# Patient Record
Sex: FEMALE | Race: OTHER | Employment: OTHER | URBAN - METROPOLITAN AREA
[De-identification: names, ages, dates, MRNs, and addresses within clinical notes are randomized per-mention and may not be internally consistent; named-entity substitution may affect disease eponyms.]

---

## 2022-05-01 ENCOUNTER — APPOINTMENT (OUTPATIENT)
Dept: CT IMAGING | Age: 67
End: 2022-05-01
Attending: EMERGENCY MEDICINE

## 2022-05-01 ENCOUNTER — APPOINTMENT (OUTPATIENT)
Dept: CT IMAGING | Age: 67
DRG: 064 | End: 2022-05-01
Attending: STUDENT IN AN ORGANIZED HEALTH CARE EDUCATION/TRAINING PROGRAM

## 2022-05-01 ENCOUNTER — APPOINTMENT (OUTPATIENT)
Dept: GENERAL RADIOLOGY | Age: 67
End: 2022-05-01
Attending: EMERGENCY MEDICINE

## 2022-05-01 ENCOUNTER — HOSPITAL ENCOUNTER (EMERGENCY)
Age: 67
Discharge: OTHER INSTITUTION | End: 2022-05-01
Attending: EMERGENCY MEDICINE

## 2022-05-01 ENCOUNTER — HOSPITAL ENCOUNTER (INPATIENT)
Age: 67
LOS: 16 days | Discharge: HOSPICE | DRG: 064 | End: 2022-05-17
Attending: STUDENT IN AN ORGANIZED HEALTH CARE EDUCATION/TRAINING PROGRAM | Admitting: INTERNAL MEDICINE

## 2022-05-01 VITALS
OXYGEN SATURATION: 98 % | RESPIRATION RATE: 17 BRPM | TEMPERATURE: 98.1 F | SYSTOLIC BLOOD PRESSURE: 142 MMHG | HEART RATE: 105 BPM | DIASTOLIC BLOOD PRESSURE: 68 MMHG

## 2022-05-01 DIAGNOSIS — R63.30 FEEDING DIFFICULTIES: ICD-10-CM

## 2022-05-01 DIAGNOSIS — Z78.9 LANGUAGE BARRIER: ICD-10-CM

## 2022-05-01 DIAGNOSIS — R29.810 FACIAL DROOP: ICD-10-CM

## 2022-05-01 DIAGNOSIS — I61.9 HEMORRHAGIC STROKE (HCC): Primary | ICD-10-CM

## 2022-05-01 DIAGNOSIS — I61.9 INTRAPARENCHYMAL HEMORRHAGE OF BRAIN (HCC): Primary | ICD-10-CM

## 2022-05-01 DIAGNOSIS — G81.90 HEMIPARESIS, UNSPECIFIED HEMIPARESIS ETIOLOGY, UNSPECIFIED LATERALITY (HCC): ICD-10-CM

## 2022-05-01 DIAGNOSIS — Z51.5 PALLIATIVE CARE ENCOUNTER: ICD-10-CM

## 2022-05-01 LAB
ANION GAP SERPL CALC-SCNC: 8 MMOL/L (ref 5–15)
BASOPHILS # BLD: 0.1 K/UL (ref 0–0.1)
BASOPHILS NFR BLD: 1 % (ref 0–1)
BUN SERPL-MCNC: 9 MG/DL (ref 6–20)
BUN/CREAT SERPL: 13 (ref 12–20)
CALCIUM SERPL-MCNC: 8.8 MG/DL (ref 8.5–10.1)
CHLORIDE SERPL-SCNC: 105 MMOL/L (ref 97–108)
CO2 SERPL-SCNC: 25 MMOL/L (ref 21–32)
COMMENT, HOLDF: NORMAL
CREAT SERPL-MCNC: 0.72 MG/DL (ref 0.55–1.02)
DIFFERENTIAL METHOD BLD: ABNORMAL
EOSINOPHIL # BLD: 0 K/UL (ref 0–0.4)
EOSINOPHIL NFR BLD: 0 % (ref 0–7)
ERYTHROCYTE [DISTWIDTH] IN BLOOD BY AUTOMATED COUNT: 13 % (ref 11.5–14.5)
GLUCOSE SERPL-MCNC: 139 MG/DL (ref 65–100)
HCT VFR BLD AUTO: 37.4 % (ref 35–47)
HGB BLD-MCNC: 12.1 G/DL (ref 11.5–16)
IMM GRANULOCYTES # BLD AUTO: 0 K/UL (ref 0–0.04)
IMM GRANULOCYTES NFR BLD AUTO: 0 % (ref 0–0.5)
LYMPHOCYTES # BLD: 0.7 K/UL (ref 0.8–3.5)
LYMPHOCYTES NFR BLD: 5 % (ref 12–49)
MCH RBC QN AUTO: 29.8 PG (ref 26–34)
MCHC RBC AUTO-ENTMCNC: 32.4 G/DL (ref 30–36.5)
MCV RBC AUTO: 92.1 FL (ref 80–99)
MONOCYTES # BLD: 0.7 K/UL (ref 0–1)
MONOCYTES NFR BLD: 5 % (ref 5–13)
NEUTS SEG # BLD: 11.6 K/UL (ref 1.8–8)
NEUTS SEG NFR BLD: 89 % (ref 32–75)
NRBC # BLD: 0 K/UL (ref 0–0.01)
NRBC BLD-RTO: 0 PER 100 WBC
PLATELET # BLD AUTO: 243 K/UL (ref 150–400)
PMV BLD AUTO: 11.2 FL (ref 8.9–12.9)
POTASSIUM SERPL-SCNC: 3.3 MMOL/L (ref 3.5–5.1)
RBC # BLD AUTO: 4.06 M/UL (ref 3.8–5.2)
RBC MORPH BLD: ABNORMAL
SAMPLES BEING HELD,HOLD: NORMAL
SODIUM SERPL-SCNC: 138 MMOL/L (ref 136–145)
TSH SERPL DL<=0.05 MIU/L-ACNC: 0.48 UIU/ML (ref 0.36–3.74)
WBC # BLD AUTO: 13.1 K/UL (ref 3.6–11)

## 2022-05-01 PROCEDURE — 96375 TX/PRO/DX INJ NEW DRUG ADDON: CPT

## 2022-05-01 PROCEDURE — 96374 THER/PROPH/DIAG INJ IV PUSH: CPT

## 2022-05-01 PROCEDURE — 84443 ASSAY THYROID STIM HORMONE: CPT

## 2022-05-01 PROCEDURE — 74011250636 HC RX REV CODE- 250/636: Performed by: NURSE PRACTITIONER

## 2022-05-01 PROCEDURE — 99285 EMERGENCY DEPT VISIT HI MDM: CPT

## 2022-05-01 PROCEDURE — 85610 PROTHROMBIN TIME: CPT

## 2022-05-01 PROCEDURE — 70450 CT HEAD/BRAIN W/O DYE: CPT

## 2022-05-01 PROCEDURE — 74011000250 HC RX REV CODE- 250: Performed by: NURSE PRACTITIONER

## 2022-05-01 PROCEDURE — 74011250636 HC RX REV CODE- 250/636: Performed by: EMERGENCY MEDICINE

## 2022-05-01 PROCEDURE — 80048 BASIC METABOLIC PNL TOTAL CA: CPT

## 2022-05-01 PROCEDURE — 93005 ELECTROCARDIOGRAM TRACING: CPT

## 2022-05-01 PROCEDURE — 85025 COMPLETE CBC W/AUTO DIFF WBC: CPT

## 2022-05-01 PROCEDURE — 65610000006 HC RM INTENSIVE CARE

## 2022-05-01 PROCEDURE — 36415 COLL VENOUS BLD VENIPUNCTURE: CPT

## 2022-05-01 PROCEDURE — 70496 CT ANGIOGRAPHY HEAD: CPT

## 2022-05-01 PROCEDURE — 74011000250 HC RX REV CODE- 250: Performed by: INTERNAL MEDICINE

## 2022-05-01 PROCEDURE — 71045 X-RAY EXAM CHEST 1 VIEW: CPT

## 2022-05-01 RX ORDER — LABETALOL HYDROCHLORIDE 5 MG/ML
10 INJECTION, SOLUTION INTRAVENOUS
Status: DISCONTINUED | OUTPATIENT
Start: 2022-05-01 | End: 2022-05-17 | Stop reason: HOSPADM

## 2022-05-01 RX ORDER — SODIUM CHLORIDE 0.9 % (FLUSH) 0.9 %
5-40 SYRINGE (ML) INJECTION EVERY 8 HOURS
Status: DISCONTINUED | OUTPATIENT
Start: 2022-05-01 | End: 2022-05-15

## 2022-05-01 RX ORDER — LORAZEPAM 2 MG/ML
0.5 INJECTION INTRAMUSCULAR ONCE
Status: COMPLETED | OUTPATIENT
Start: 2022-05-01 | End: 2022-05-01

## 2022-05-01 RX ORDER — ACETAMINOPHEN 325 MG/1
650 TABLET ORAL
Status: DISCONTINUED | OUTPATIENT
Start: 2022-05-01 | End: 2022-05-17 | Stop reason: HOSPADM

## 2022-05-01 RX ORDER — POTASSIUM CHLORIDE 7.45 MG/ML
10 INJECTION INTRAVENOUS
Status: COMPLETED | OUTPATIENT
Start: 2022-05-01 | End: 2022-05-02

## 2022-05-01 RX ORDER — SODIUM CHLORIDE 0.9 % (FLUSH) 0.9 %
5-40 SYRINGE (ML) INJECTION AS NEEDED
Status: DISCONTINUED | OUTPATIENT
Start: 2022-05-01 | End: 2022-05-15

## 2022-05-01 RX ORDER — ONDANSETRON 4 MG/1
4 TABLET, ORALLY DISINTEGRATING ORAL
Status: DISCONTINUED | OUTPATIENT
Start: 2022-05-01 | End: 2022-05-17 | Stop reason: HOSPADM

## 2022-05-01 RX ORDER — DEXMEDETOMIDINE HYDROCHLORIDE 4 UG/ML
.1-1.5 INJECTION, SOLUTION INTRAVENOUS
Status: DISCONTINUED | OUTPATIENT
Start: 2022-05-01 | End: 2022-05-04

## 2022-05-01 RX ORDER — ACETAMINOPHEN 650 MG/1
650 SUPPOSITORY RECTAL
Status: DISCONTINUED | OUTPATIENT
Start: 2022-05-01 | End: 2022-05-17 | Stop reason: HOSPADM

## 2022-05-01 RX ORDER — LEVETIRACETAM 500 MG/5ML
1000 INJECTION, SOLUTION, CONCENTRATE INTRAVENOUS EVERY 12 HOURS
Status: DISCONTINUED | OUTPATIENT
Start: 2022-05-01 | End: 2022-05-01 | Stop reason: HOSPADM

## 2022-05-01 RX ORDER — SODIUM CHLORIDE 450 MG/100ML
50 INJECTION, SOLUTION INTRAVENOUS CONTINUOUS
Status: DISCONTINUED | OUTPATIENT
Start: 2022-05-01 | End: 2022-05-02

## 2022-05-01 RX ORDER — ONDANSETRON 2 MG/ML
4 INJECTION INTRAMUSCULAR; INTRAVENOUS
Status: DISCONTINUED | OUTPATIENT
Start: 2022-05-01 | End: 2022-05-17 | Stop reason: HOSPADM

## 2022-05-01 RX ORDER — POLYETHYLENE GLYCOL 3350 17 G/17G
17 POWDER, FOR SOLUTION ORAL DAILY PRN
Status: DISCONTINUED | OUTPATIENT
Start: 2022-05-01 | End: 2022-05-17 | Stop reason: HOSPADM

## 2022-05-01 RX ADMIN — NICARDIPINE HYDROCHLORIDE 5 MG/HR: 25 INJECTION, SOLUTION INTRAVENOUS at 22:16

## 2022-05-01 RX ADMIN — SODIUM CHLORIDE 50 ML/HR: 4.5 INJECTION, SOLUTION INTRAVENOUS at 21:58

## 2022-05-01 RX ADMIN — DEXMEDETOMIDINE HYDROCHLORIDE 0.7 MCG/KG/HR: 4 INJECTION, SOLUTION INTRAVENOUS at 23:02

## 2022-05-01 RX ADMIN — LORAZEPAM 0.5 MG: 2 INJECTION INTRAMUSCULAR; INTRAVENOUS at 21:06

## 2022-05-01 RX ADMIN — LEVETIRACETAM 1000 MG: 100 INJECTION, SOLUTION INTRAVENOUS at 18:03

## 2022-05-01 RX ADMIN — DEXMEDETOMIDINE HYDROCHLORIDE 0.4 MCG/KG/HR: 4 INJECTION, SOLUTION INTRAVENOUS at 21:56

## 2022-05-01 RX ADMIN — LABETALOL HYDROCHLORIDE 10 MG: 5 INJECTION INTRAVENOUS at 21:19

## 2022-05-01 RX ADMIN — SODIUM CHLORIDE, PRESERVATIVE FREE 5 ML: 5 INJECTION INTRAVENOUS at 21:08

## 2022-05-01 NOTE — ED TRIAGE NOTES
Pt to ED brought in by daughter, Urdu speaking for c/o inability to ambulate and L arm weakness since 0600 am yesterday with, generalized pain, fatigue, AMS and \"psych problems\". Per daughter pt has possible mental health concerns.  Pt projectile vomiting during triage, unable to get vitals due to pt being uncooperative and brought back straight back to bed 18, primary RN at bedside and aware of pt

## 2022-05-01 NOTE — ED PROVIDER NOTES
HPI   62-year-old female with a past medical history of schizophrenia presents to the emergency department due to left-sided weakness and vomiting. Her symptoms started when she woke up yesterday at 6 AM.  Her daughter states she was not moving the left side of her body. Her daughter states she has lots of behavioral issues secondary to her schizophrenia and thought she was having behavioral outburst.  However the patient was more lethargic and seems somewhat confused throughout the day and yesterday so she brought her to the ER. Shortly prior to coming to the ER the patient began vomiting. Her daughter states she only takes naproxen. She is not on any blood thinners. She does not have a history of stroke and she normally does not have weakness on one side of her body. History obtained from the patient's daughter using a [employer] .     Past Medical History:   Diagnosis Date    Psychotic disorder     concern for schizoaffective disorder       Past Surgical History:   Procedure Laterality Date    HX TUBAL LIGATION           Family History:   Problem Relation Age of Onset    Diabetes Neg Hx     Heart Disease Neg Hx     Psychiatric Disorder Neg Hx        Social History     Socioeconomic History    Marital status:      Spouse name: Not on file    Number of children: Not on file    Years of education: Not on file    Highest education level: Not on file   Occupational History    Not on file   Tobacco Use    Smoking status: Never Smoker    Smokeless tobacco: Not on file   Substance and Sexual Activity    Alcohol use: No    Drug use: No    Sexual activity: Not Currently     Birth control/protection: Surgical   Other Topics Concern    Not on file   Social History Narrative    Not on file     Social Determinants of Health     Financial Resource Strain:     Difficulty of Paying Living Expenses: Not on file   Food Insecurity:     Worried About Running Out of Food in the Last Year: Not on file Ran Out of Food in the Last Year: Not on file   Transportation Needs:     Lack of Transportation (Medical): Not on file    Lack of Transportation (Non-Medical): Not on file   Physical Activity:     Days of Exercise per Week: Not on file    Minutes of Exercise per Session: Not on file   Stress:     Feeling of Stress : Not on file   Social Connections:     Frequency of Communication with Friends and Family: Not on file    Frequency of Social Gatherings with Friends and Family: Not on file    Attends Druze Services: Not on file    Active Member of Clubs or Organizations: Not on file    Attends Club or Organization Meetings: Not on file    Marital Status: Not on file   Intimate Partner Violence:     Fear of Current or Ex-Partner: Not on file    Emotionally Abused: Not on file    Physically Abused: Not on file    Sexually Abused: Not on file   Housing Stability:     Unable to Pay for Housing in the Last Year: Not on file    Number of [place] in the Last Year: Not on file    Unstable Housing in the Last Year: Not on file         ALLERGIES: Patient has no known allergies. Review of Systems   A complete review of systems was performed and all systems reviewed are negative unless otherwise documented in the HPI. There were no vitals filed for this visit. Physical Exam  Constitutional:       Comments: Patient appears somewhat distressed. Not diaphoretic   HENT:      Head:      Comments: No appreciable signs of head trauma     Mouth/Throat:      Comments: Moist mucous membranes  Eyes:      Comments: Pupils are 2 mm and reactive bilaterally. Extraocular movements notable for patient not looking to the left. Neck:      Comments: Trachea midline. No appreciable cervical spine tenderness. No JVD. Cardiovascular:      Comments: Regular rate and rhythm. 2+ radial pulses bilaterally. No murmurs. Pulmonary:      Effort: Pulmonary effort is normal. No respiratory distress.       Breath sounds: Normal breath sounds. No wheezing or rales. Abdominal:      General: There is no distension. Palpations: Abdomen is soft. Tenderness: There is no abdominal tenderness. Musculoskeletal:         General: No deformity. Normal range of motion. Skin:     General: Skin is warm and dry. Neurological:      Comments: Awake and alert. Speech is normal.  Patient has left-sided hemiparesis as well as a left lower facial droop. No dysarthria or aphasia. Sensation seems intact. Left-sided hemineglect noted. NIH stroke scale of 12. MDM     48-year-old female presents to the above chief complaint. She is somewhat hypertensive with blood pressures in the 571Q over 651 systolic. On exam she is hemiparetic on the left. She also has a left-sided facial droop and some left-sided hemineglect. CT scan shows a right parietal intraparenchymal hematoma with a small subdural hematoma. Neurosurgery has been notified. Patient will be transferred to the ER at [facility]. We are obtaining a CTA of the head and neck. Patient accepted by Dr. Altagracia Silva in the ER at [facility]. She was given 1 g of Keppra. I have confirmed with the daughter that she is not anticoagulated. She is protecting her airway at this time. Patient will be admitted after arrival to [facility] and she will receive neurosurgical evaluation. Daughter is agreeable with this plan. EKG shows sinus tachycardia with a rate of 106. Qtc is 446. No concerning ST elevations or depressions. Critical Care  Performed by: Brendan Mcdonald MD  Authorized by: Brendan Mcdonald MD     Critical care provider statement:     Critical care time (minutes):  40    Critical care was necessary to treat or prevent imminent or life-threatening deterioration of the following conditions: Intraparenchymal hemorrhage resulting in left-sided hemiparesis as well as facial droop and left-sided hemineglect.     Critical care was time spent personally by me on the following activities:  Blood draw for specimens, development of treatment plan with patient or surrogate, discussions with consultants, evaluation of patient's response to treatment, examination of patient, obtaining history from patient or surrogate, ordering and performing treatments and interventions, ordering and review of laboratory studies, ordering and review of radiographic studies, pulse oximetry, re-evaluation of patient's condition and review of old charts    I assumed direction of critical care for this patient from another provider in my specialty: no

## 2022-05-01 NOTE — ED PROVIDER NOTES
Patient seen at [facility] today found to have hemorrhagic stroke 5.1 x 4.7 x 4.8 cm. Small amount of midline shift. Small subdural on the right falx    Per conversation with Dr. Eunice Bay at [facility] patient was found this way yesterday morning. Last known normal likely the evening of the 29th. Neurosurgery aware, I discussed the patient with them and unlikely be intervention at this time based on time course of patient's bleed. On arrival in the ED patient is awake and alert, complete left-sided paralysis. Airway intact. Blood pressure in route was elevated but once in the ED 636Z and 729O systolic. The history is provided by medical records and the EMS personnel. The history is limited by the condition of the patient. Facial Droop  This is a new problem. The current episode started 2 days ago. The problem has not changed since onset. There was left facial focality noted. Primary symptoms include mental status change. There has been no fever. Associated symptoms include altered mental status and confusion. Pertinent negatives include no nausea. Associated medical issues include CVA.         Past Medical History:   Diagnosis Date    Psychotic disorder     concern for schizoaffective disorder       Past Surgical History:   Procedure Laterality Date    HX TUBAL LIGATION           Family History:   Problem Relation Age of Onset    Diabetes Neg Hx     Heart Disease Neg Hx     Psychiatric Disorder Neg Hx        Social History     Socioeconomic History    Marital status:      Spouse name: Not on file    Number of children: Not on file    Years of education: Not on file    Highest education level: Not on file   Occupational History    Not on file   Tobacco Use    Smoking status: Never Smoker    Smokeless tobacco: Not on file   Substance and Sexual Activity    Alcohol use: No    Drug use: No    Sexual activity: Not Currently     Birth control/protection: Surgical   Other Topics Concern    Not on file   Social History Narrative    Not on file     Social Determinants of Health     Financial Resource Strain:     Difficulty of Paying Living Expenses: Not on file   Food Insecurity:     Worried About Running Out of Food in the Last Year: Not on file    [place] of Food in the Last Year: Not on file   Transportation Needs:     Lack of Transportation (Medical): Not on file    Lack of Transportation (Non-Medical): Not on file   Physical Activity:     Days of Exercise per Week: Not on file    Minutes of Exercise per Session: Not on file   Stress:     Feeling of Stress : Not on file   Social Connections:     Frequency of Communication with Friends and Family: Not on file    Frequency of Social Gatherings with Friends and Family: Not on file    Attends Alevism Services: Not on file    Active Member of [address] [place] [address] [employer] or Organizations: Not on file    Attends Club or Organization Meetings: Not on file    Marital Status: Not on file   Intimate Partner Violence:     Fear of Current or Ex-Partner: Not on file    Emotionally Abused: Not on file    Physically Abused: Not on file    Sexually Abused: Not on file   Housing Stability:     Unable to Pay for Housing in the Last Year: Not on file    Number of [place] in the Last Year: Not on file    Unstable Housing in the Last Year: Not on file         ALLERGIES: Patient has no known allergies. Review of Systems   Unable to perform ROS: Acuity of condition   Gastrointestinal: Negative for nausea. Psychiatric/Behavioral: Positive for confusion. There were no vitals filed for this visit. Physical Exam  Vitals and nursing note reviewed. Constitutional:       General: She is not in acute distress. Appearance: She is ill-appearing. HENT:      Head: Normocephalic and atraumatic.       Right Ear: External ear normal.      Left Ear: External ear normal.      Nose: Nose normal.   Eyes:      Extraocular Movements: Extraocular movements intact. Conjunctiva/sclera: Conjunctivae normal.   Cardiovascular:      Rate and Rhythm: Normal rate. Pulses: Normal pulses. Radial pulses are 2+ on the right side and 2+ on the left side. Heart sounds: Normal heart sounds. Pulmonary:      Effort: Pulmonary effort is normal.      Breath sounds: Normal breath sounds. Abdominal:      General: Abdomen is flat. There is no distension. Tenderness: There is no abdominal tenderness. Musculoskeletal:         General: No deformity or signs of injury. Normal range of motion. Cervical back: Normal range of motion and neck supple. No tenderness. Skin:     General: Skin is warm and dry. Capillary Refill: Capillary refill takes less than 2 seconds. Neurological:      Mental Status: She is alert. Cranial Nerves: Cranial nerve deficit present. Motor: Weakness present. Psychiatric:         Attention and Perception: Attention normal.          MDM       LABORATORY RESULTS:  Labs Reviewed - No data to display    IMAGING RESULTS:  No orders to display   CT scan read from exam at 1726 PM    There is an acute bleed involving the right posterior parietal lobe that extends  to the cortical surface. This measures 5.1 x 4.7 x 4.8 cm = 57.5 cm3 and is  surrounded by a small amount of the hypodensity and cortical effacement. Some of  the blood extends to the calvarial surface but a defined subdural hematoma is  not seen along the convexity. There is a very small subdural along the entire  right falx that varies between 1 and 2 mm (image 37). In addition, there is a  small amount of midline shift from right to left of about 2 mm (image 34). Compression of the ipsilateral lateral ventricle posterior horn is evident.     The bone windows demonstrate no abnormalities.  The visualized portions of the  paranasal sinuses and mastoid air cells are clear.     IMPRESSION  Acute intracranial right posterior parietal bleed with midline shift as  described. Small right falcine 2 mm subdural hematoma. MEDICATIONS GIVEN:  Medications - No data to display    MDM: Patient is a 68-year-old female with schizophrenia who was transferred from [facility] after being found to have a acute head bleed. Patient discussed with neurosurgery as well as a neuro stepdown unit. Initially patient's blood pressure was acceptable however became high and patient required Cardene drip and likely Precedex and therefore required ICU admission. ICU team was consulted and patient mated to ICU for further treatment evaluation. No acute neurosurgical intervention indicated at this time per neurosurgery. DISPOSITION: Admitted    [address] [place] [address] for Admission  7:38 PM    ED Room Number: NS37/36  Patient Name and age:  Jorge L Bird 78 y.o.  female  Working Diagnosis:   1. Hemorrhagic stroke ([facility] [address]        COVID-19 Suspicion:  no  Sepsis present:  no  Reassessment needed: no  Code Status:  Full Code  Readmission: no  Isolation Requirements:  no  Recommended Level of Care:  step down  Department:  [address] [place] [address] Adult ED - 21     Other: Patient sent to ED to ED for hemorrhagic stroke. Neurosurgery has evaluated the images and I do not feel any intervention is needed at this time. We discussed appropriate place for this patient. Based on her stability for likely the last 48 hours and no significant changes she is appropriate for stepdown unit. Patient does have mental health disorders including schizophrenia. Chidi Thomas.  Violeta Pacheco MD      Procedures

## 2022-05-01 NOTE — ED TRIAGE NOTES
TRIAGE NOTE:   Patient transferred from [address] [place] [address] for head bleed. Patient not moving left side, patient able to move RUE and RLE. Minimal command following even with .

## 2022-05-01 NOTE — ED NOTES
Verbal shift change report given to Uma Gong RN (oncoming nurse) by Nanci Saucedo RN (offgoing nurse). Report included the following information SBAR, ED Summary, Intake/Output, MAR and Recent Results.

## 2022-05-01 NOTE — Clinical Note
Status[de-identified] INPATIENT [101]   Type of Bed: Intermediate Care [34]   Cardiac Monitoring Required?: Yes   Inpatient Hospitalization Certified Necessary for the Following Reasons: 3.  Patient receiving treatment that can only be provided in an inpatient setting (further clarification in H&P documentation)   Admitting Diagnosis: Hemorrhagic stroke [facility]) [[id number]]   Admitting Physician: Lola Mercado   Attending Physician: Lola Mercado   Estimated Length of Stay: 3-4 Midnights   Discharge Plan[de-identified] Extended Care Facility (e.g. Adult Home, Nursing Home, etc.)

## 2022-05-02 ENCOUNTER — APPOINTMENT (OUTPATIENT)
Dept: MRI IMAGING | Age: 67
DRG: 064 | End: 2022-05-02
Attending: INTERNAL MEDICINE

## 2022-05-02 ENCOUNTER — APPOINTMENT (OUTPATIENT)
Dept: NON INVASIVE DIAGNOSTICS | Age: 67
DRG: 064 | End: 2022-05-02
Attending: INTERNAL MEDICINE

## 2022-05-02 LAB
ANION GAP SERPL CALC-SCNC: 6 MMOL/L (ref 5–15)
APPEARANCE UR: CLEAR
APTT PPP: 26 SEC (ref 22.1–31)
ATRIAL RATE: 106 BPM
BACTERIA URNS QL MICRO: ABNORMAL /HPF
BASOPHILS # BLD: 0 K/UL (ref 0–0.1)
BASOPHILS NFR BLD: 0 % (ref 0–1)
BILIRUB UR QL: NEGATIVE
BUN SERPL-MCNC: 8 MG/DL (ref 6–20)
BUN/CREAT SERPL: 11 (ref 12–20)
CALCIUM SERPL-MCNC: 8.1 MG/DL (ref 8.5–10.1)
CALCULATED P AXIS, ECG09: 58 DEGREES
CALCULATED R AXIS, ECG10: 75 DEGREES
CALCULATED T AXIS, ECG11: 66 DEGREES
CHLORIDE SERPL-SCNC: 106 MMOL/L (ref 97–108)
CHOLEST SERPL-MCNC: 161 MG/DL
CO2 SERPL-SCNC: 24 MMOL/L (ref 21–32)
COLOR UR: ABNORMAL
CREAT SERPL-MCNC: 0.73 MG/DL (ref 0.55–1.02)
DIAGNOSIS, 93000: NORMAL
DIFFERENTIAL METHOD BLD: ABNORMAL
EOSINOPHIL # BLD: 0 K/UL (ref 0–0.4)
EOSINOPHIL NFR BLD: 0 % (ref 0–7)
EPITH CASTS URNS QL MICRO: ABNORMAL /LPF
ERYTHROCYTE [DISTWIDTH] IN BLOOD BY AUTOMATED COUNT: 13.2 % (ref 11.5–14.5)
EST. AVERAGE GLUCOSE BLD GHB EST-MCNC: 117 MG/DL
FIBRINOGEN PPP-MCNC: 463 MG/DL (ref 200–475)
GLUCOSE SERPL-MCNC: 135 MG/DL (ref 65–100)
GLUCOSE UR STRIP.AUTO-MCNC: NEGATIVE MG/DL
HBA1C MFR BLD: 5.7 % (ref 4–5.6)
HCT VFR BLD AUTO: 31.2 % (ref 35–47)
HDLC SERPL-MCNC: 69 MG/DL
HDLC SERPL: 2.3 {RATIO} (ref 0–5)
HGB BLD-MCNC: 10 G/DL (ref 11.5–16)
HGB UR QL STRIP: ABNORMAL
HYALINE CASTS URNS QL MICRO: ABNORMAL /LPF (ref 0–5)
IMM GRANULOCYTES # BLD AUTO: 0.1 K/UL (ref 0–0.04)
IMM GRANULOCYTES NFR BLD AUTO: 1 % (ref 0–0.5)
INR PPP: 1 (ref 0.9–1.1)
KETONES UR QL STRIP.AUTO: ABNORMAL MG/DL
LDLC SERPL CALC-MCNC: 77.6 MG/DL (ref 0–100)
LEUKOCYTE ESTERASE UR QL STRIP.AUTO: ABNORMAL
LYMPHOCYTES # BLD: 0.9 K/UL (ref 0.8–3.5)
LYMPHOCYTES NFR BLD: 8 % (ref 12–49)
MCH RBC QN AUTO: 29.8 PG (ref 26–34)
MCHC RBC AUTO-ENTMCNC: 32.1 G/DL (ref 30–36.5)
MCV RBC AUTO: 92.9 FL (ref 80–99)
MONOCYTES # BLD: 1 K/UL (ref 0–1)
MONOCYTES NFR BLD: 8 % (ref 5–13)
NEUTS SEG # BLD: 9.6 K/UL (ref 1.8–8)
NEUTS SEG NFR BLD: 83 % (ref 32–75)
NITRITE UR QL STRIP.AUTO: POSITIVE
NRBC # BLD: 0 K/UL (ref 0–0.01)
NRBC BLD-RTO: 0 PER 100 WBC
P-R INTERVAL, ECG05: 168 MS
PH UR STRIP: 6 [PH] (ref 5–8)
PLATELET # BLD AUTO: 217 K/UL (ref 150–400)
PMV BLD AUTO: 11.3 FL (ref 8.9–12.9)
POTASSIUM SERPL-SCNC: 4.7 MMOL/L (ref 3.5–5.1)
PROCALCITONIN SERPL-MCNC: 0.05 NG/ML
PROT UR STRIP-MCNC: NEGATIVE MG/DL
PROTHROMBIN TIME: 10.9 SEC (ref 9–11.1)
Q-T INTERVAL, ECG07: 336 MS
QRS DURATION, ECG06: 84 MS
QTC CALCULATION (BEZET), ECG08: 446 MS
RBC # BLD AUTO: 3.36 M/UL (ref 3.8–5.2)
RBC #/AREA URNS HPF: ABNORMAL /HPF (ref 0–5)
SODIUM SERPL-SCNC: 136 MMOL/L (ref 136–145)
SP GR UR REFRACTOMETRY: 1.01 (ref 1–1.03)
THERAPEUTIC RANGE,PTTT: NORMAL SECS (ref 58–77)
TRIGL SERPL-MCNC: 72 MG/DL (ref ?–150)
UROBILINOGEN UR QL STRIP.AUTO: 1 EU/DL (ref 0.2–1)
VENTRICULAR RATE, ECG03: 106 BPM
VLDLC SERPL CALC-MCNC: 14.4 MG/DL
WBC # BLD AUTO: 11.6 K/UL (ref 3.6–11)
WBC URNS QL MICRO: ABNORMAL /HPF (ref 0–4)

## 2022-05-02 PROCEDURE — 74011000250 HC RX REV CODE- 250: Performed by: INTERNAL MEDICINE

## 2022-05-02 PROCEDURE — 83036 HEMOGLOBIN GLYCOSYLATED A1C: CPT

## 2022-05-02 PROCEDURE — 99231 SBSQ HOSP IP/OBS SF/LOW 25: CPT | Performed by: PSYCHIATRY & NEUROLOGY

## 2022-05-02 PROCEDURE — 80048 BASIC METABOLIC PNL TOTAL CA: CPT

## 2022-05-02 PROCEDURE — 74011000250 HC RX REV CODE- 250: Performed by: NURSE PRACTITIONER

## 2022-05-02 PROCEDURE — 87040 BLOOD CULTURE FOR BACTERIA: CPT

## 2022-05-02 PROCEDURE — 74011000258 HC RX REV CODE- 258: Performed by: NURSE PRACTITIONER

## 2022-05-02 PROCEDURE — 80061 LIPID PANEL: CPT

## 2022-05-02 PROCEDURE — 74011250637 HC RX REV CODE- 250/637: Performed by: INTERNAL MEDICINE

## 2022-05-02 PROCEDURE — A9576 INJ PROHANCE MULTIPACK: HCPCS

## 2022-05-02 PROCEDURE — 36415 COLL VENOUS BLD VENIPUNCTURE: CPT

## 2022-05-02 PROCEDURE — 74011250636 HC RX REV CODE- 250/636: Performed by: NURSE PRACTITIONER

## 2022-05-02 PROCEDURE — 92610 EVALUATE SWALLOWING FUNCTION: CPT

## 2022-05-02 PROCEDURE — 84145 PROCALCITONIN (PCT): CPT

## 2022-05-02 PROCEDURE — 70553 MRI BRAIN STEM W/O & W/DYE: CPT

## 2022-05-02 PROCEDURE — 74011250636 HC RX REV CODE- 250/636: Performed by: PSYCHIATRY & NEUROLOGY

## 2022-05-02 PROCEDURE — 74011000636 HC RX REV CODE- 636: Performed by: RADIOLOGY

## 2022-05-02 PROCEDURE — 65610000006 HC RM INTENSIVE CARE

## 2022-05-02 PROCEDURE — 74011250636 HC RX REV CODE- 250/636

## 2022-05-02 PROCEDURE — 95816 EEG AWAKE AND DROWSY: CPT | Performed by: PSYCHIATRY & NEUROLOGY

## 2022-05-02 PROCEDURE — 51798 US URINE CAPACITY MEASURE: CPT

## 2022-05-02 PROCEDURE — 81001 URINALYSIS AUTO W/SCOPE: CPT

## 2022-05-02 PROCEDURE — 85025 COMPLETE CBC W/AUTO DIFF WBC: CPT

## 2022-05-02 RX ORDER — LEVETIRACETAM 500 MG/5ML
500 INJECTION, SOLUTION, CONCENTRATE INTRAVENOUS EVERY 12 HOURS
Status: COMPLETED | OUTPATIENT
Start: 2022-05-02 | End: 2022-05-09

## 2022-05-02 RX ORDER — SODIUM CHLORIDE, SODIUM LACTATE, POTASSIUM CHLORIDE, CALCIUM CHLORIDE 600; 310; 30; 20 MG/100ML; MG/100ML; MG/100ML; MG/100ML
75 INJECTION, SOLUTION INTRAVENOUS CONTINUOUS
Status: DISCONTINUED | OUTPATIENT
Start: 2022-05-02 | End: 2022-05-05

## 2022-05-02 RX ORDER — VANCOMYCIN HYDROCHLORIDE
1250 ONCE
Status: COMPLETED | OUTPATIENT
Start: 2022-05-02 | End: 2022-05-02

## 2022-05-02 RX ADMIN — PIPERACILLIN SODIUM AND TAZOBACTAM SODIUM 3.38 G: 3; 375 INJECTION, POWDER, FOR SOLUTION INTRAVENOUS at 20:13

## 2022-05-02 RX ADMIN — LEVETIRACETAM 500 MG: 100 INJECTION, SOLUTION, CONCENTRATE INTRAVENOUS at 14:14

## 2022-05-02 RX ADMIN — ACETAMINOPHEN 650 MG: 650 SUPPOSITORY RECTAL at 21:32

## 2022-05-02 RX ADMIN — DEXMEDETOMIDINE HYDROCHLORIDE 0.6 MCG/KG/HR: 4 INJECTION, SOLUTION INTRAVENOUS at 20:02

## 2022-05-02 RX ADMIN — PIPERACILLIN AND TAZOBACTAM 4.5 G: 4; .5 INJECTION, POWDER, LYOPHILIZED, FOR SOLUTION INTRAVENOUS at 06:04

## 2022-05-02 RX ADMIN — PIPERACILLIN SODIUM AND TAZOBACTAM SODIUM 3.38 G: 3; 375 INJECTION, POWDER, FOR SOLUTION INTRAVENOUS at 13:12

## 2022-05-02 RX ADMIN — SODIUM CHLORIDE, PRESERVATIVE FREE 10 ML: 5 INJECTION INTRAVENOUS at 22:00

## 2022-05-02 RX ADMIN — DEXMEDETOMIDINE HYDROCHLORIDE 0.6 MCG/KG/HR: 4 INJECTION, SOLUTION INTRAVENOUS at 07:06

## 2022-05-02 RX ADMIN — IOPAMIDOL 100 ML: 755 INJECTION, SOLUTION INTRAVENOUS at 00:21

## 2022-05-02 RX ADMIN — SODIUM CHLORIDE, PRESERVATIVE FREE 10 ML: 5 INJECTION INTRAVENOUS at 06:00

## 2022-05-02 RX ADMIN — GADOTERIDOL 10 ML: 279.3 INJECTION, SOLUTION INTRAVENOUS at 23:39

## 2022-05-02 RX ADMIN — VANCOMYCIN HYDROCHLORIDE 1250 MG: 10 INJECTION, POWDER, LYOPHILIZED, FOR SOLUTION INTRAVENOUS at 06:04

## 2022-05-02 RX ADMIN — SODIUM CHLORIDE, PRESERVATIVE FREE 10 ML: 5 INJECTION INTRAVENOUS at 13:15

## 2022-05-02 RX ADMIN — NICARDIPINE HYDROCHLORIDE 5 MG/HR: 25 INJECTION, SOLUTION INTRAVENOUS at 20:12

## 2022-05-02 RX ADMIN — POTASSIUM CHLORIDE 10 MEQ: 7.46 INJECTION, SOLUTION INTRAVENOUS at 02:42

## 2022-05-02 RX ADMIN — SODIUM CHLORIDE, POTASSIUM CHLORIDE, SODIUM LACTATE AND CALCIUM CHLORIDE 75 ML/HR: 600; 310; 30; 20 INJECTION, SOLUTION INTRAVENOUS at 05:00

## 2022-05-02 RX ADMIN — POTASSIUM CHLORIDE 10 MEQ: 7.46 INJECTION, SOLUTION INTRAVENOUS at 00:39

## 2022-05-02 RX ADMIN — POTASSIUM CHLORIDE 10 MEQ: 7.46 INJECTION, SOLUTION INTRAVENOUS at 01:40

## 2022-05-02 RX ADMIN — SODIUM CHLORIDE, POTASSIUM CHLORIDE, SODIUM LACTATE AND CALCIUM CHLORIDE 500 ML: 600; 310; 30; 20 INJECTION, SOLUTION INTRAVENOUS at 05:00

## 2022-05-02 RX ADMIN — SODIUM CHLORIDE, POTASSIUM CHLORIDE, SODIUM LACTATE AND CALCIUM CHLORIDE 75 ML/HR: 600; 310; 30; 20 INJECTION, SOLUTION INTRAVENOUS at 12:18

## 2022-05-02 NOTE — CONSULTS
Neurology Attending Addendum:    Overview, interval history and physical as documented by Ms. Desean Arroyo reviewed and agree with her contents. On attempted evaluation this morning patient was seen without any family in the room and was resting comfortably. Did not arouse to attempt awakening. Gaze is midline and opening eyes pupils symmetric. Face is symmetric. Neck is supple. Cardiopulmonary exams appear unrevealing. Abdomen is nondistended, extremities are warm/dry. Was not able to perform exam further than this. On review of data, patient has a moderately sized right parietal hemorrhage presumably initiated on April 30 based on history. Has remained stable on subsequent scans. Remains pending MRI for further evaluation suspect hypertensive at this moment possibly in the second of amyloidosis. However hemorrhagic stroke conversion of stroke, tumor, venous abnormalities all remain possible as well. Coags, platelets are in range. Would continue to maintain blood pressure less than 140/90. Neurology continue to follow please call questions concerns. Given the limited exam will obtain EEG as well as place patient on prophylactic Keppra for 7 days. Neurocritical Care Consult  Reginald Jaffe NP    Patient: Crystal Roy MRN: [id number]  SSN: [id number]    YOB: 1955  Age: 78 y.o. Sex: female      Chief Complaint:AMS, left sided weakness    Subjective:      Crystal Roy is a English-speaking 78 y.o. F with a pmh of Schizophrenia who presented to [facility] on the evening of 5/1 via her daughter by private vehicle reporting left arm weakness, difficulty speaking and inability ambulate since 4/30 0600. Per daughter, pt has \"psych\" problems and she thought this was due to her mental health issues.  Pt projectile vomited while in triage and was uncooperative with vital signs and exam. CT of the head showed  an acute right posterial parietal bleed of approx 57.5 ml with surrounding edema and 2 mm midline shift. Her BP was 158/120 on arrival. She was emergently transferred to [address] [place] [address] for higher level of care. Neurosurgery was also consulted and there is no role for neurosurgery at this time. Neurology has been consulted to see the patient. She does not currently take any medications other than naproxen. She does not have a prior history of stroke. There were no reports of fall or trauma. She lives with her daughter who states that mother has lots of behavioral issues 2/2 her schizophrenia. History obtained from the patient's daughter using a [place] . Pt was initially to be admitted to the hospitalist service but her BP went as high at 199/101 in the ED and she had to be started on an nicardipine gtt. Additionally, pt was provided 0.5 mg Ativan for agitation and then placed on a precedex gtt in the ED as well as right wrist restraints, necessitating a change in admission to ICU status. Past Medical History:   Diagnosis Date    Psychotic disorder     concern for schizoaffective disorder     Family History   Problem Relation Age of Onset    Diabetes Neg Hx     Heart Disease Neg Hx     Psychiatric Disorder Neg Hx      Social History     Tobacco Use    Smoking status: Never Smoker    Smokeless tobacco: Not on file   Substance Use Topics    Alcohol use: No      None       No Known Allergies    Review of Systems:  Review of systems is limited due to patient factors. Pt is uncooperative with the exam. She does endorse wanted to drink water and points to head when asked about pain. She also is wanting to lie on her left side and is agitated with the blood pressure cuff. Denies chest pain.     Objective:     Vitals:    05/02/22 0029 05/02/22 0030 05/02/22 0100 05/02/22 0130   BP:  (!) 101/58 (!) 90/52 (!) 110/52   Pulse:  79 76 83   Resp:  24 26 24   Temp: (!) 101.4 °F (38.6 °C) (!) 101.4 °F (38.6 °C)     SpO2:  98% 99% 96%   Weight:  48.6 kg (107 lb 2.3 oz)        Physical Exam:  GENERAL: Thin, older  female, restless and resisting exam.    Neurologic Exam:  Mental Status:  Oriented to self only. Does not follow commands. Does not answer questions. Only states that she is thirsty. Language:    Per  and daughter, language is understandable. Patient mumbles. Cranial Nerves:   Pupils 3 mm, equal, round and sluggishly reactive to light. Left visual field cut. Right gaze preference. Responds to pinch on face bilaterally     Mild left facial droop        Motor:    No movement left arm. No movement left leg. Moves right arm and attempts to remove left BP cuff. Moves left leg. Bulk and tone normal.      No involuntary movements. Sensation:    Responds with pain to BP cuff on left arm, responds to pinch on left leg. Reflexes:    Negative Babinskis    Coordination & Gait: Gait deferred. Does not follow commands.     NIHSS:      1a-LOC: 0    1b-Month/Age:1    1c-Open/Close Hand:2    2-Best Gaze:2    3-Visual Fields:2    4-Facial Palsy:1    5a-Left Arm:4    5b-Right Arm:0    6a-Left Le    6b-Right Le    7-Limb Ataxia:0    8-Sensory:2    9-Best Language:1    10-Dysarthria:1    11-Extinction/Inattention:1  TOTAL SCORE: 21         Labs:  Lab Results   Component Value Date/Time    WBC 13.1 (H) 2022 10:19 PM    Hemoglobin (POC) 11.6 10/30/2014 08:56 AM    HGB 12.1 2022 10:19 PM    HCT 37.4 2022 10:19 PM    PLATELET 080  10:19 PM    MCV 92.1 2022 10:19 PM      Lab Results   Component Value Date/Time    Sodium 138 2022 10:19 PM    Potassium 3.3 (L) 2022 10:19 PM    Chloride 105 2022 10:19 PM    CO2 25 2022 10:19 PM    Anion gap 8 2022 10:19 PM    Glucose 139 (H) 2022 10:19 PM    BUN 9 2022 10:19 PM    Creatinine 0.72 2022 10:19 PM    BUN/Creatinine ratio 13 2022 10:19 PM    GFR est AA >60 2022 10:19 PM    GFR est non-AA >60 2022 10:19 PM    Calcium 8.8 05/01/2022 10:19 PM     No results found for: CPK, RCK1, RCK2, RCK3, RCK4, CKMB, CKNDX, CKND1, TROPT, TROIQ, BNPP, BNP    Imaging:  CT Results (maximum last 3): [facility]    CTA HEAD NECK W CONT    Narrative  *PRELIMINARY REPORT*    No large focal occlusion or aneurysm. No significant change in large right  parieto-occipital parenchymal hemorrhage with edema and localized mass effect or  in small parafalcine subdural hemorrhage. Preliminary report was provided by Dr. Jennifer Weinstein, the on-call radiologist, at  01:05    Final report to follow. *END PRELIMINARY REPORT*      CT HEAD WO CONT    Narrative  EXAM: CT HEAD WO CONT    INDICATION: follow up CT head for known ICH    COMPARISON: None. CONTRAST: None. TECHNIQUE: Unenhanced CT of the head was performed using 5 mm images. Brain and  bone windows were generated. Coronal and sagittal reformats. CT dose reduction  was achieved through use of a standardized protocol tailored for this  examination and automatic exposure control for dose modulation. FINDINGS:  Right frontoparietal parenchymal hemorrhage with associated edema and local  sulcal effacement and trace leftward midline shift is redemonstrated without  significant change measuring 4.8 x 5.1 cm. There is right parafalcine subdural  hemorrhage measuring up to 2 mm in thickness, not significant changed. The  ventricles and sulci are not significantly changed in size, shape and  configuration with effacement of the posterior horn of left lateral ventricle. There is newly evident trace intraventricular hemorrhage dependently within the  left posterior lateral ventricle horn. . There is no significant white matter  disease. There is no intracranial hemorrhage, extra-axial collection, or mass  effect. The basilar cisterns are open. No CT evidence of acute infarct. The bone windows demonstrate no abnormalities.  The visualized portions of the  paranasal sinuses and mastoid air cells are clear. Impression  1. Right parieto-occipital and right falcine hemorrhages with associated edema  and mass effect and leftward midline shift are not significantly changed from  prior. 2. There is new the evident layering intraventricular hemorrhage in the  posterior left lateral ventricular horn. 3. No acute infarct or other acute interval change. CT CODE NEURO HEAD WO CONTRAST    Narrative  EXAM: CT CODE NEURO HEAD WO CONTRAST    INDICATION: Code Stroke    COMPARISON: None. CONTRAST: None. TECHNIQUE: Unenhanced CT of the head was performed using 5 mm images. Brain and  bone windows were generated. Coronal and sagittal reformats. CT dose reduction  was achieved through use of a standardized protocol tailored for this  examination and automatic exposure control for dose modulation. FINDINGS:  There is an acute bleed involving the right posterior parietal lobe that extends  to the cortical surface. This measures 5.1 x 4.7 x 4.8 cm = 57.5 cm3 and is  surrounded by a small amount of the hypodensity and cortical effacement. Some of  the blood extends to the calvarial surface but a defined subdural hematoma is  not seen along the convexity. There is a very small subdural along the entire  right falx that varies between 1 and 2 mm (image 37). In addition, there is a  small amount of midline shift from right to left of about 2 mm (image 34). Compression of the ipsilateral lateral ventricle posterior horn is evident. The bone windows demonstrate no abnormalities. The visualized portions of the  paranasal sinuses and mastoid air cells are clear. Impression  Acute intracranial right posterior parietal bleed with midline shift as  described. Small right falcine 2 mm subdural hematoma.     This result was called by me at 1741 hours to Nasim Cuevas, charge nurse    Assessment:     Nasim Cuevas, a 77 yo right-handed female with a PMH of schizophrenia, not on any medication except occasional Naproxen usage, has been admitted for [facility] with an acute Right Posterior Parietal Bleed approx 57.5 ml with a 2 mm midline shift. She also has a small Right falcine subdural hematoma. Her BP was elevated in the ED and this is suspicious for a hypertensive bleed. CTA did not show any aneurysm. Pt will need a full stroke workup with SBP control <140. Hospital Problems  Date Reviewed: 6/9/2015          Codes Class Noted POA    Hemorrhagic stroke [facility]) ICD-10-CM: I61.9  ICD-9-CM: 501  5/1/2022 Unknown            Plan:     1) Intracerebral Hemorrhage, ICH score: 2   - Initial CT brain 5/1 1730 showed acute intracranial Right posterior Parietal bleed with midline shift, small right 2mm subdural hematoma   -Repeat CT Brain showed no significant change   - CTA showed no focal occlusion or aneurysm   - SBP goal less than 140, Cardene/Labetalol PRN   - q1 neuro checks    - A1C and lipid panel pending, LDL goal <70              - MRI brain ordered, obtain when pt able to tolerate it              - ECHO ordered              - PT/OT/SLP evals   - Palliative Care consult ordered for goals of care discussion              - Stroke education   - NSGY following, no intervention at this time   - LR 75 ml/hr, changed from 0.5% NS ordered by hospitalist         I have discussed the diagnosis and the intended plan as seen in the above orders with Patricia Brown ICU NP and the primary RN. Patient/family updated on current plan of care and is in agreement. All questions were answered. Further recommendations to follow from Dr. Gudelia Huston.    Thank you for this consult and participating in the care of this patient.   Signed By: Janki Harris NP     May 2, 2022

## 2022-05-02 NOTE — H&P
[address] [facility]  HISTORY AND PHYSICAL    Name:  Kim Fang  MR#:  [id number]  :  1955  ACCOUNT #:  [de-identified]  ADMIT DATE:  2022      CHIEF COMPLAINT:  Altered mental state and left-sided weakness. HISTORY OF PRESENT ILLNESS:  The patient is a 65-year-old lady with a history of schizophrenia, who was brought into the emergency department by her daughter for evaluation of the left-sided weakness and mental status changes. The patient has been in her usual health until yesterday morning when she was first noted to have difficulty moving her left side of her body. Her daughter initially thought that was related to her behavioral issues, schizophrenia, but today symptoms persisted and decided to bring her to the ER. She also seem to be more lethargic intermittently and also less able to communicate and follow commands. The patient has also been complaining of right-sided occipital headache, which she still is complaining of now. There was also vomiting prior to coming to the ER earlier today. There is no report of head trauma, chest pain, loss of consciousness. In the ER, she was found to have left-sided hemiplegia. History is obtained from the patient's daughter and the patient herself using an  as the patient only speaks [place] She was first seen at [address] [place] [address] and transferred to [facility] for further management. She has been seen by Neurosurgery as well. In the emergency department, the patient was restless in bed, asking for water to drink. There is no report of chest pain. PAST MEDICAL HISTORY:  Includes only schizophrenia. MEDICATIONS:  Naproxen. ALLERGIES:  NONE. FAMILY HISTORY:  Includes diabetes, heart disease, and psychiatric disorder. SOCIAL HISTORY:  The patient lives at home with family. REVIEW OF SYSTEMS:  As above, otherwise unremarkable. All systems reviewed.   There is no report of easy bruising or bleeding. PHYSICAL EXAMINATION:  GENERAL:  The patient is an elderly lady who appears restless, in no respiratory distress. VITAL SIGNS:  She has a heart rate of 105, temperature unavailable, blood pressure is 149/68, respirations 20, pulse oximetry 98% on room air. HEENT  Head is atraumatic. There are no scalp bruises noted. Pupils appear reactive to light, but difficult to fully assess due to the patient's lack of cooperation. No facial trauma. Oral mucosa is somewhat dry. NECK:  Supple without JVD. LUNGS:  Clear to auscultation bilaterally. HEART:  Rhythm is regular. ABDOMEN:  Soft and nontender without masses. EXTREMITIES:  Lower extremity examination shows no edema. No signs of DVT. NEUROLOGIC:  The patient is awake, restless, not following commands, but able to speak with a fairly clear speech, but does have frequent incidence of restlessness/agitation. She has a left-sided hemiplegia. LABORATORY DATA AND IMAGING STUDIES:  Head CT scan showed an acute intracranial right posterior parietal bleed with midline shift and a small right falcine 2-mm subdural hematoma. The intracerebral bleed measures 5.1 x 4.7 x 4.8 cm. Chest x-ray shows no acute process. White blood cell count 5.3, hemoglobin 12.4, platelet count 869. Sodium 141, potassium 4.0, CO2 is 26, glucose 88, BUN 8, creatinine 0.45. LFTs normal.  . EKG, not available. ASSESSMENT:  1. Acute/subacute hemorrhagic stroke with left-sided hemiplegia. 2.  History of schizoaffective disorder. 3.  Hypertension. 4.  Altered mental state due to stroke. PLAN:  The patient is admitted to the Neurology unit for further management. She is currently being seen in consultation by Neurosurgery as well. We will need to repeat brain imaging to assess the evolution of the hemorrhage and midline shift.   We will need to manage blood pressure with a goal systolic less than 947, also need to control the patient's agitation to allow for medical treatment. Ativan and labetalol has been ordered. Frequent neuro checks. Further evaluation as indicated. She does not need urgent surgical intervention at this point. DVT prophylaxis with SCDs. Consult the Palliative Care for further care decisions. I spoke with the patient's daughter at the bedside who states that there are no other family members besides her who has an interest in the patient's well being and in making decisions for her health on her behalf. The patient's wishes are to not intubate her, but do perform resuscitative measures otherwise including chest compressions and heart shocks. Prognosis appears guarded at this point.       MD VINCENT [email or web address]  D:  05/01/2022 21:23  T:  05/01/2022 23:43  JOB #:  [id number]

## 2022-05-02 NOTE — PROGRESS NOTES
Consulted after patient requiring cardene for HTN. Will start her on precedex gtt for agitation. Plan to get CTA once agitation controlled. Full H&P to follow.     [facility]  Critical Care Nurse Practitioner  [employer]

## 2022-05-02 NOTE — PROGRESS NOTES
Reviewed chart for transitions of care, and discussed in rounds. CM met with patient's niece Jeanmarie Ear [phone]  at bedside to explain role and offer support. Baseline:   ADLs/IDALS: Independent  Previous Home Health:None  Previous SNF/IPR:None  ER Contact: Daughter Rosa Chou      Patient is independent with ADLs/IDALs  Per niece patient has no previous HH or equipment needs. Transportation TBD  Reason for Admission:  ICH                     RUR Score:   10%                  Plan for utilizing home health:  TBD        PCP: First and Last name:  None     Name of Practice:    Are you a current patient: Yes/No:    Approximate date of last visit:    Can you participate in a virtual visit with your PCP:                     Current Advanced Directive/Advance Care Plan: Partial Code      Healthcare Decision Maker:   Click here to complete [address] including selection of the Healthcare Decision Maker Relationship (ie \"Primary\")                             Transition of Care Plan:        Care manager met with patient's niece to introduce self and explain role. Patient is Vietnamese speaking. Per Niece Jeanmarie Ear [phone], patient's daughter Rosa Chou  with whom she lives will be here later this afternoon. Per niece patient is not a citizen and has no insurance. Care manager will continue to foolw for transitions of care. Bernard Tavarez RN,Care Management  Care Management Interventions  MyChart Signup: No  Discharge Durable Medical Equipment: No  Physical Therapy Consult: Yes  Occupational Therapy Consult: Yes  Speech Therapy Consult: Yes  Support Systems: Child(justin) (Daughter:  Rosa Chou)  Confirm Follow Up Transport: Family

## 2022-05-02 NOTE — PROGRESS NOTES
SLP Contact Note    SLP evaluation complete. Recommend NPO with ice chips. Full note to follow.       Thank you,  DAIN Rios.Ed, [id number] [facility]  Speech-Language Pathologist

## 2022-05-02 NOTE — PROGRESS NOTES
[facility]    ICU TEAM Progress Note    Name: Donald Vyas   : 1955   MRN: [id number]   Date: 2022        [facility]  Reji Gallagher is a 54-year-old female with PMHx of schizophrenia who presents to the ED as a transfer from an OSH ED for right-sided mixed density right parietal parenchymal hemorrhage. Per chart review, her symptoms began yesterday morning with left-sided hemiparesis. Family brought her to the OSH today after she became more lethargic with AMS. Neurosurgery evaluated patient and no need for neurosurgical intervention at this time. She was started on Cardene in the ED for hypertension and Precedex for agitation. OSH was unable to perform CTA due to agitation. Will attempt CTA while on Precedex. She will be admitted to the ICU for further medical management. Reason for ICU Admission: ICH    Subjective:   Overnight Events:   2022  On precedex drip  Reviewed neurology and neurosurgery notes    Hospital Course  : Admitted to ICU on Cardene and Precedex infusions.  : on precedex drip, sedated, does not follow any commands    POD:  * No surgery found *    S/P:            ICU Assessment and Plan:     ICH  HTN  Acute encephalopathy  Schizophrenia         ICU Comprehensive Plan of Care:     1. Neurological System    Spontaneous Awakening Trial: N/A  Sedation: Precedex  Analgesia: Acetaminophen   MRI of brain today    2. Cardiovascular System    SBP Goal of: > 90 mmHg and < 160 mmHg  MAP Goal of: > 65 mmHg  Nicardipine (Cardene) - For above SBP/MAP goals  Transfusion Trigger (Hgb): <7 g/dL  Keep K > 4; Mg > 2     3. Respiratory System  Incentive spirometry  Spontaneous Breathing Trial: N/A  Pulmonary toilet: Incentive Spirometry   SpO2 Goal: > 92%  DVT Prophylaxis: SCD's or Sequential Compression Device     4.  Renal/GI/Endocrine System  IVFs:   Ulcer Prophylaxis: Not at this time   Bowel Regimen: MiraLax  Feeding:  No NPO  Blood Sugar Goal 120-180 - Glycemic Control: Insulin    5. Infectious Disease : no acute issues at this time    Indwelling Catheter:  Tubes: None  Lines: Peripheral IV  Drains: None  D - De-escalation of Antibiotics:   None    6. PT/OT: PT consulted and on board, OT consulted and on board and Speech therapy consulted and on board     7. Goals of Care Discussion with family Pending   8. Palliative care consult    9. Plan of Care/Code Status: DNI    10. Discussed Care Plan with Bedside RN    11. Documentation of Current Medications      Active Problem List:     Problem List  Date Reviewed: 2015          Codes Class    Hemorrhagic stroke [facility]) ICD-10-CM: I61.9  ICD-9-CM: 440         Schizoaffective disorder ([facility] [address] ICD-10-CM: F25.9  ICD-9-CM: 295.70               Past Medical History:      has a past medical history of Psychotic disorder. Past Surgical History:      has a past surgical history that includes hx tubal ligation.     Home Medications:     Prior to Admission medications    Not on File       Allergies/Social/Family History:     No Known Allergies   Social History     Tobacco Use    Smoking status: Never Smoker    Smokeless tobacco: Not on file   Substance Use Topics    Alcohol use: No      Family History   Problem Relation Age of Onset    Diabetes Neg Hx     Heart Disease Neg Hx     Psychiatric Disorder Neg Hx        Review of Systems:     Review of Systems   Unable to perform ROS: Acuity of condition       Objective:   Vital Signs:  Visit Vitals  BP (!) 121/55   Pulse (!) 56   Temp 99.4 °F (37.4 °C)   Resp 25   Wt 48.6 kg (107 lb 2.3 oz)   SpO2 97%   BMI 24.62 kg/m²      O2 Device: None (Room air) Temp (24hrs), Av.7 °F (37.6 °C), Min:98.1 °F (36.7 °C), Max:101.4 °F (38.6 °C)           Intake/Output:     Intake/Output Summary (Last 24 hours) at 2022 1021  Last data filed at 2022 0700  Gross per 24 hour   Intake 1804.59 ml   Output 670 ml   Net 1134.59 ml       Physical Exam:  HEENT: normal  Heart: s1,s2  Lungs: clear  Abd: soft  Ext: no edema  Cns: cannot assess, deeply sedated on precedex as earlier she was very agitated      LABS AND  DATA: Personally reviewed  Recent Labs     22   WBC 11.6* 13.1*   HGB 10.0* 12.1   HCT 31.2* 37.4    243     Recent Labs     22    138   K 4.7 3.3*    105   CO2 24 25   BUN 8 9   CREA 0.73 0.72   * 139*   CA 8.1* 8.8     No results for input(s): AP, TBIL, TP, ALB, GLOB, AML, LPSE in the last 72 hours. No lab exists for component: SGOT, GPT, AMYP  Recent Labs     22   INR 1.0   PTP 10.9   APTT 26.0      No results for input(s): PHI, PCO2I, PO2I, FIO2I in the last 72 hours. No results for input(s): CPK, CKMB, TROIQ, BNPP in the last 72 hours. Imagin2022       Chest X-Ray:  CXR Results  (Last 48 hours)               22 1759  XR CHEST PORT Final result    Impression:  No acute process. Narrative: Indication: Left-sided weakness, vomiting       Comparison: None       Portable exam of the chest obtained at 1758 demonstrates normal heart size. There is no acute process in the lung fields. The osseous structures are   unremarkable. ECHO:  pending    MEDS: Reviewed    Multidisciplinary Rounds Completed:  Pending   ABCDEF Bundle/Checklist Completed:  Yes    SPECIAL EQUIPMENT  None    DISPOSITION  Stay in ICU    CRITICAL CARE CONSULTANT NOTE  I had a face to face encounter with the patient, reviewed and interpreted patient data including clinical events, labs, images, vital signs, I/O's, and examined patient. I have discussed the case and the plan and management of the patient's care with the consulting services, the bedside nurses and the respiratory therapist.      NOTE OF PERSONAL INVOLVEMENT IN CARE   This patient has a high probability of imminent, clinically significant deterioration, which requires the highest level of preparedness to intervene urgently.  I participated in the decision-making and personally managed or directed the management of the following life and organ supporting interventions that required my frequent assessment to treat or prevent imminent deterioration. I personally spent 40 minutes of critical care time. This is time spent at this critically ill patient's bedside actively involved in patient care as well as the coordination of care. This does not include any procedural time which has been billed separately.     [address]  5/2/2022

## 2022-05-02 NOTE — PROGRESS NOTES
Occupational therapy  05/02/22     OT eval order received and acknowledged. Chart reviewed and discussed with nursing who reports patient is not following commands and not appropriate for participation with therapy at this time. Will continue to follow patient and attempt OT eval at a later time.      Thank you,  Barbara Mullen, OTR/L

## 2022-05-02 NOTE — PROGRESS NOTES
Physical Therapy:     Orders received and chart reviewed in prep for PT yris.  RN reporting patient is not following commands and is not appropriate for participation with therapy at this time. Will follow up for PT yris as able and medically appropriate.       Lily Verma, PT, DPT

## 2022-05-02 NOTE — PROGRESS NOTES
0730: Bedside and Verbal shift change report given to Xu Bennett RN and Kristina Love RN (oncoming nurse) by BOB Taylor (offgoing nurse). Report included the following information SBAR, Kardex, ED Summary, Intake/Output, MAR, Recent Results and Cardiac Rhythm NSR.

## 2022-05-02 NOTE — ADVANCED PRACTICE NURSE
Neurocritical Care Code Stroke - ICH Documentation    Symptoms:   AMS, left side weak, pain, fatigue, vomiting, transferred here from [facility] Well:  at least since 0600 22   Medical hx:     Past Medical History:   Diagnosis Date    Psychotic disorder     concern for schizoaffective disorder      Anticoagulation:  none   ICH Score:   GCS: 13    ICH Volume: 57.5    IVH:yes    Location:supratentorial    Age: 78  ICH SCORE: 2   Imaging:   CT: acute intracranial Right posterior Parietal bleed with midline shift, small right 2mm subdural hematoma       Plan:        NIHSS:      1a-LOC: 0    1b-Month/Age:1    1c-Open/Close Hand:2    2-Best Gaze:2    3-Visual Fields:2    4-Facial Palsy:1    5a-Left Arm:4    5b-Right Arm:0    6a-Left Le    6b-Right Le    7-Limb Ataxia:0    8-Sensory:2    9-Best Language:1    10-Dysarthria:1    11-Extinction/Inattention:1  TOTAL SCORE: 21        Discussed with: Dr. Renetta Rene, Hospitalist, [place] ICU NP, ED RN, patient and family    Arrival time:   Time spent: [address] [place] [address], NP  Neurocritical Care Nurse Practitioner  [phone]      1) Intracerebral Hemorrhage, ICH score: 2   - SBP goal less than 140, Labetalol PRN, Nicardipine   - q2 neuro checks    - Repeat CT head ordered for    - Obtain CTA of H/N at same time if possible              Recommend ICU admission for agitation, BP control and neurochecks. Discussed with [facility] and Hospitalist Agnes Zimmerman

## 2022-05-02 NOTE — PROGRESS NOTES
Problem: Dysphagia (Adult)  Goal: *Acute Goals and Plan of Care (Insert Text)  Description: Speech Therapy Goals  Initiated 5/2/2022    1. Patient will participate in swallow re-evaluation within 7 days. Outcome: Progressing Towards Goal     SPEECH LANGUAGE PATHOLOGY BEDSIDE SWALLOW EVALUATION  Patient: Eric Randall (58 y.o. female)  Date: 5/2/2022  Primary Diagnosis: Hemorrhagic stroke [facility]) [I61.9]        Precautions:        ASSESSMENT :  Based on the objective data described below, the patient presents with high risk for prandial aspiration given recent acute neuro onset and very prolonged oral phase resulting in a swallow after about five minutes with one ice chip. Suspect mentation to be largest barrier to PO intake. At this juncture, recommend pt be NPO with alternate means of nutrition/hydration/medication and ice chips for swallow rehab and pt comfort. Prognosis of swallow function to be dependent on mentation. Patient will benefit from skilled intervention to address the above impairments. Patients rehabilitation potential is considered to be Fair     PLAN :  Recommendations and Planned Interventions:  --NPO with ice chips  --short-term alternate means of nutrition/hydration/medication  --good oral care    Frequency/Duration: Patient will be followed by speech-language pathology 3 times a week to address goals. Discharge Recommendations:  To Be Determined     SUBJECTIVE:   Patient non-verbal.    OBJECTIVE:     Past Medical History:   Diagnosis Date    Psychotic disorder     concern for schizoaffective disorder     Past Surgical History:   Procedure Laterality Date    HX TUBAL LIGATION         Diet prior to admission: presumed regular diet/thin liquids  Current Diet:  NPO   Cognitive and Communication Status:  Neurologic State: Confused,Drowsy  Orientation Level: Disoriented X4  Cognition: No command following      NOMS:   The NOMS functional outcome measure was used to quantify this patient's level of swallowing impairment. Based on the NOMS, the patient was determined to be at level 1 for swallow function       NOMS Swallowing Levels:  Level 1 (CN): NPO  Level 2 (CM): NPO but takes consistency in therapy  Level 3 (CL): Takes less than 50% of nutrition p.o. and continues with nonoral feedings; and/or safe with mod cues; and/or max diet restriction  Level 4 (CK): Safe swallow but needs mod cues; and/or mod diet restriction; and/or still requires some nonoral feeding/supplements  Level 5 (CJ): Safe swallow with min diet restriction; and/or needs min cues  Level 6 (CI): Independent with p.o.; rare cues; usually self cues; may need to avoid some foods or needs extra time  Level 7 ([address] [place] [address]): Independent for all p.o.  JAILENE. (2003). National Outcomes Measurement System (NOMS): Adult Speech-Language Pathology User's Guide. Pain:  Pain Scale 1: Adult Nonverbal Pain Scale  Pain Intensity 1: 0       After treatment:   Call bell within reach and Nursing notified    COMMUNICATION/EDUCATION:     The patient's plan of care including recommendations, planned interventions, and recommended diet changes were discussed with: Registered nurse. Patient is unable to participate in goal setting and plan of care.     Thank you for this referral.  ANJANA Price  Time Calculation: 10 mins

## 2022-05-02 NOTE — CONSULTS
[address]    Name:  Ludivina Orantes  MR#:  [id number]  :  1955  ACCOUNT #:  [de-identified]  DATE OF SERVICE:  2022    HISTORY OF PRESENT ILLNESS:  A 43-year-old woman, with a past medical history that includes schizophrenia and prior intracerebral hemorrhages, presents with a right-sided mixed density right parietal parenchymal hemorrhage. Her symptoms apparently started yesterday. She is left with a left-sided hemipareses. The patient was more lethargic and more change in mental status when her daughter decided to bring her in today. I have spoken to the neurology nurse practitioner about her and reviewed the films as well. PAST SURGICAL HISTORY:  She has a history of tubal ligation. MEDICATIONS:  Unknown. I suspect that she has not been using any of her medications. SOCIAL HISTORY:  Nonsmoker. No alcohol use. REVIEW OF SYSTEMS:  Otherwise, noncontributory. No leg pain. PHYSICAL EXAMINATION:  She is awake. She has left hemiplegia and left-sided facial droop. Pupils are equal and reactive. IMAGING DATA:  Imaging studies are noted as above. There is virtually no mass effect from this. There would be no neurosurgical intervention necessary at all. She has been started on Keppra. I will defer care to the Intensivist and Medical Service. They obviously will maintain more normal vital signs. I will remain available for questions, but again, in this particular instance, there is no need for neurosurgical intervention. CT angiogram might be helpful or MRI with and without contrast might be helpful to make sure there is no underlying mass lesion, but there appears to be a mixed density hemorrhage of unknown age, some of it is acute. Thank you for asking me to see this patient.       Juan Jose Valdez MD      [id number]  D:  2022 21:26  T:  2022 22:07  JOB #:  [id number]  CC:  Sophie Silver MD

## 2022-05-02 NOTE — ED NOTES
TRANSFER - OUT REPORT:    Verbal report given to Claudia RN(name) on Derrell Campos  being transferred to ICU (unit) for routine progression of care       Report consisted of patients Situation, Background, Assessment and   Recommendations(SBAR). Information from the following report(s) SBAR, ED Summary, Intake/Output, MAR and Recent Results was reviewed with the receiving nurse. Lines:   Peripheral IV 05/01/22 Right Antecubital (Active)       Peripheral IV 05/01/22 Posterior;Right Hand (Active)        Opportunity for questions and clarification was provided.       Patient transported with:   Monitor  Registered Nurse

## 2022-05-02 NOTE — PROGRESS NOTES
[phone] [id number]- report called from [address] [place] [address] REPORT:    Verbal report received from [facility]) on Gladis Leone  being received from ER(unit) for routine progression of care      Report consisted of patients Situation, Background, Assessment and   Recommendations(SBAR). Information from the following report(s) SBAR, Kardex, ED Summary, Intake/Output, MAR, Recent Results, Cardiac Rhythm nsr, Alarm Parameters  and Dual Neuro Assessment was reviewed with the receiving nurse. Opportunity for questions and clarification was provided. Assessment completed upon patients arrival to unit and care assumed. Primary Nurse Crystal Hagen, BOB and Vipin Minors performed a dual skin assessment on this patient No impairment noted  Eugene score is 14     Pt had elevated Temp on Admission, see Flowsheet. Maintenance Fluids changed to LR and a 500ml bolus was given  Urine and paired Blood Cultures sent, pt started on ABX. 0730- Bedside and Verbal shift change report given to [address] and Tisha Martin RN (oncoming nurse) by Cat RN (offgoing nurse). Report included the following information SBAR, Kardex, ED Summary, Intake/Output, MAR, Recent Results, Med Rec Status, Cardiac Rhythm NSR, Alarm Parameters  and Dual Neuro Assessment.

## 2022-05-02 NOTE — PROGRESS NOTES
Problem: Pressure Injury - Risk of  Goal: *Prevention of pressure injury  Description: Document Eugene Scale and appropriate interventions in the flowsheet. Outcome: Progressing Towards Goal  Note: Pressure Injury Interventions:  Sensory Interventions: Assess changes in LOC,Check visual cues for pain,Discuss PT/OT consult with provider,Monitor skin under medical devices,Turn and reposition approx. every two hours (pillows and wedges if needed)         Activity Interventions: Pressure redistribution bed/mattress(bed type),Increase time out of bed    Mobility Interventions: Pressure redistribution bed/mattress (bed type),Turn and reposition approx.  every two hours(pillow and wedges),HOB 30 degrees or less    Nutrition Interventions: Document food/fluid/supplement intake                     Problem: Patient Education: Go to Patient Education Activity  Goal: Patient/Family Education  Outcome: Progressing Towards Goal     Problem: Non-Violent Restraints  Goal: Removal from restraints as soon as assessed to be safe  Outcome: Progressing Towards Goal  Goal: No harm/injury to patient while restraints in use  Outcome: Progressing Towards Goal  Goal: Patient's dignity will be maintained  Outcome: Progressing Towards Goal  Goal: Patient Interventions  Outcome: Progressing Towards Goal     Problem: Patient Education: Go to Patient Education Activity  Goal: Patient/Family Education  Outcome: Progressing Towards Goal     Problem: TIA/CVA Stroke: 0-24 hours  Goal: Off Pathway (Use only if patient is Off Pathway)  Outcome: Progressing Towards Goal  Goal: Activity/Safety  Outcome: Progressing Towards Goal  Goal: Consults, if ordered  Outcome: Progressing Towards Goal  Goal: Diagnostic Test/Procedures  Outcome: Progressing Towards Goal  Goal: Nutrition/Diet  Outcome: Progressing Towards Goal  Goal: Discharge Planning  Outcome: Progressing Towards Goal  Goal: Respiratory  Outcome: Progressing Towards Goal  Goal: Treatments/Interventions/Procedures  Outcome: Progressing Towards Goal  Goal: *Hemodynamically stable  Outcome: Progressing Towards Goal  Goal: *Neurologically stable  Description: Absence of additional neurological deficits    Outcome: Progressing Towards Goal  Goal: *Verbalizes anxiety and depression are reduced or absent  Outcome: Progressing Towards Goal  Goal: *Absence of Signs of Aspiration on Current Diet  Outcome: Progressing Towards Goal  Goal: *Absence of deep venous thrombosis signs and symptoms(Stroke Metric)  Outcome: Progressing Towards Goal  Goal: *Ability to perform ADLs and demonstrates progressive mobility and function  Outcome: Progressing Towards Goal  Goal: *Stroke education started(Stroke Metric)  Outcome: Progressing Towards Goal  Goal: *Dysphagia screen performed(Stroke Metric)  Outcome: Progressing Towards Goal  Goal: *Rehab consulted(Stroke Metric)  Outcome: Progressing Towards Goal     Problem: Ischemic Stroke: Discharge Outcomes  Goal: *Verbalizes anxiety and depression are reduced or absent  Outcome: Progressing Towards Goal  Goal: *Verbalize understanding of risk factor modification(Stroke Metric)  Outcome: Progressing Towards Goal  Goal: *Hemodynamically stable  Outcome: Progressing Towards Goal  Goal: *Absence of aspiration pneumonia  Outcome: Progressing Towards Goal  Goal: *Aware of needed dietary changes  Outcome: Progressing Towards Goal  Goal: *Verbalize understanding of prescribed medications including anti-coagulants, anti-lipid, and/or anti-platelets(Stroke Metric)  Outcome: Progressing Towards Goal  Goal: *Tolerating diet  Outcome: Progressing Towards Goal  Goal: *Aware of follow-up diagnostics related to anticoagulants  Outcome: Progressing Towards Goal  Goal: *Ability to perform ADLs and demonstrates progressive mobility and function  Outcome: Progressing Towards Goal  Goal: *Absence of DVT(Stroke Metric)  Outcome: Progressing Towards Goal  Goal: *Absence of aspiration  Outcome: Progressing Towards Goal  Goal: *Optimal pain control at patient's stated goal  Outcome: Progressing Towards Goal  Goal: *Home safety concerns addressed  Outcome: Progressing Towards Goal  Goal: *Describes available resources and support systems  Outcome: Progressing Towards Goal  Goal: *Verbalizes understanding of activation of EMS(911) for stroke symptoms(Stroke Metric)  Outcome: Progressing Towards Goal  Goal: *Understands and describes signs and symptoms to report to providers(Stroke Metric)  Outcome: Progressing Towards Goal  Goal: *Neurolgocially stable (absence of additional neurological deficits)  Outcome: Progressing Towards Goal  Goal: *Verbalizes importance of follow-up with primary care physician(Stroke Metric)  Outcome: Progressing Towards Goal  Goal: *Smoking cessation discussed,if applicable(Stroke Metric)  Outcome: Progressing Towards Goal  Goal: *Depression screening completed(Stroke Metric)  Outcome: Progressing Towards Goal

## 2022-05-03 ENCOUNTER — APPOINTMENT (OUTPATIENT)
Dept: GENERAL RADIOLOGY | Age: 67
DRG: 064 | End: 2022-05-03
Attending: INTERNAL MEDICINE

## 2022-05-03 ENCOUNTER — APPOINTMENT (OUTPATIENT)
Dept: NON INVASIVE DIAGNOSTICS | Age: 67
DRG: 064 | End: 2022-05-03
Attending: INTERNAL MEDICINE

## 2022-05-03 LAB
BACTERIA SPEC CULT: NORMAL
BACTERIA SPEC CULT: NORMAL
ECHO AO ROOT DIAM: 3 CM
ECHO AO ROOT INDEX: 2.24 CM/M2
ECHO AV AREA PEAK VELOCITY: 1.2 CM2
ECHO AV AREA/BSA PEAK VELOCITY: 0.9 CM2/M2
ECHO AV PEAK GRADIENT: 9 MMHG
ECHO AV PEAK VELOCITY: 1.5 M/S
ECHO AV VELOCITY RATIO: 0.67
ECHO EST RA PRESSURE: 10 MMHG
ECHO LA DIAMETER INDEX: 2.24 CM/M2
ECHO LA DIAMETER: 3 CM
ECHO LA TO AORTIC ROOT RATIO: 1
ECHO LV E' LATERAL VELOCITY: 7 CM/S
ECHO LV E' SEPTAL VELOCITY: 7 CM/S
ECHO LV FRACTIONAL SHORTENING: 38 % (ref 28–44)
ECHO LV INTERNAL DIMENSION DIASTOLE INDEX: 2.91 CM/M2
ECHO LV INTERNAL DIMENSION DIASTOLIC: 3.9 CM (ref 3.9–5.3)
ECHO LV INTERNAL DIMENSION SYSTOLIC INDEX: 1.79 CM/M2
ECHO LV INTERNAL DIMENSION SYSTOLIC: 2.4 CM
ECHO LV IVSD: 0.9 CM (ref 0.6–0.9)
ECHO LV MASS 2D: 97.4 G (ref 67–162)
ECHO LV MASS INDEX 2D: 72.7 G/M2 (ref 43–95)
ECHO LV POSTERIOR WALL DIASTOLIC: 0.8 CM (ref 0.6–0.9)
ECHO LV RELATIVE WALL THICKNESS RATIO: 0.41
ECHO LVOT AREA: 1.8 CM2
ECHO LVOT DIAM: 1.5 CM
ECHO LVOT PEAK GRADIENT: 4 MMHG
ECHO LVOT PEAK VELOCITY: 1 M/S
ECHO MV A VELOCITY: 0.87 M/S
ECHO MV AREA PHT: 4 CM2
ECHO MV E DECELERATION TIME (DT): 190.7 MS
ECHO MV E VELOCITY: 0.83 M/S
ECHO MV E/A RATIO: 0.95
ECHO MV E/E' LATERAL: 11.86
ECHO MV E/E' RATIO (AVERAGED): 11.86
ECHO MV E/E' SEPTAL: 11.86
ECHO MV PRESSURE HALF TIME (PHT): 55.3 MS
ECHO PV MAX VELOCITY: 0.5 M/S
ECHO PV PEAK GRADIENT: 1 MMHG
ECHO RIGHT VENTRICULAR SYSTOLIC PRESSURE (RVSP): 36 MMHG
ECHO RV FREE WALL PEAK S': 9 CM/S
ECHO RV INTERNAL DIMENSION: 2.9 CM
ECHO RV TAPSE: 2.3 CM (ref 1.7–?)
ECHO TV REGURGITANT MAX VELOCITY: 2.54 M/S
ECHO TV REGURGITANT PEAK GRADIENT: 26 MMHG
SERVICE CMNT-IMP: NORMAL

## 2022-05-03 PROCEDURE — 74011250636 HC RX REV CODE- 250/636: Performed by: NURSE PRACTITIONER

## 2022-05-03 PROCEDURE — 92526 ORAL FUNCTION THERAPY: CPT

## 2022-05-03 PROCEDURE — 99231 SBSQ HOSP IP/OBS SF/LOW 25: CPT | Performed by: PSYCHIATRY & NEUROLOGY

## 2022-05-03 PROCEDURE — 74011000250 HC RX REV CODE- 250: Performed by: NURSE PRACTITIONER

## 2022-05-03 PROCEDURE — 95816 EEG AWAKE AND DROWSY: CPT | Performed by: PSYCHIATRY & NEUROLOGY

## 2022-05-03 PROCEDURE — 74011250637 HC RX REV CODE- 250/637: Performed by: INTERNAL MEDICINE

## 2022-05-03 PROCEDURE — 65610000006 HC RM INTENSIVE CARE

## 2022-05-03 PROCEDURE — 77030040704 HC NSL TU RETAIN SYS BRDL PRO AMR -B

## 2022-05-03 PROCEDURE — 99222 1ST HOSP IP/OBS MODERATE 55: CPT | Performed by: NURSE PRACTITIONER

## 2022-05-03 PROCEDURE — 74011250636 HC RX REV CODE- 250/636: Performed by: PSYCHIATRY & NEUROLOGY

## 2022-05-03 PROCEDURE — 74011250636 HC RX REV CODE- 250/636: Performed by: INTERNAL MEDICINE

## 2022-05-03 PROCEDURE — 74011000250 HC RX REV CODE- 250: Performed by: INTERNAL MEDICINE

## 2022-05-03 PROCEDURE — 74018 RADEX ABDOMEN 1 VIEW: CPT

## 2022-05-03 PROCEDURE — 93306 TTE W/DOPPLER COMPLETE: CPT | Performed by: INTERNAL MEDICINE

## 2022-05-03 PROCEDURE — 74011000258 HC RX REV CODE- 258: Performed by: NURSE PRACTITIONER

## 2022-05-03 PROCEDURE — 77030004950 HC CATH ENTRL NG COVD -A

## 2022-05-03 PROCEDURE — 93306 TTE W/DOPPLER COMPLETE: CPT

## 2022-05-03 RX ORDER — LOSARTAN POTASSIUM 50 MG/1
50 TABLET ORAL DAILY
Status: DISCONTINUED | OUTPATIENT
Start: 2022-05-03 | End: 2022-05-07

## 2022-05-03 RX ORDER — METOCLOPRAMIDE HYDROCHLORIDE 5 MG/ML
5 INJECTION INTRAMUSCULAR; INTRAVENOUS
Status: DISCONTINUED | OUTPATIENT
Start: 2022-05-03 | End: 2022-05-17 | Stop reason: HOSPADM

## 2022-05-03 RX ORDER — LIDOCAINE 4 G/100G
1 PATCH TOPICAL EVERY 24 HOURS
Status: DISCONTINUED | OUTPATIENT
Start: 2022-05-03 | End: 2022-05-17 | Stop reason: HOSPADM

## 2022-05-03 RX ORDER — QUETIAPINE FUMARATE 25 MG/1
50 TABLET, FILM COATED ORAL 2 TIMES DAILY
Status: DISCONTINUED | OUTPATIENT
Start: 2022-05-03 | End: 2022-05-07

## 2022-05-03 RX ORDER — OLANZAPINE 5 MG/1
10 TABLET ORAL
Status: DISCONTINUED | OUTPATIENT
Start: 2022-05-03 | End: 2022-05-03

## 2022-05-03 RX ADMIN — ACETAMINOPHEN 650 MG: 650 SUPPOSITORY RECTAL at 06:37

## 2022-05-03 RX ADMIN — ACETAMINOPHEN 650 MG: 325 TABLET ORAL at 13:59

## 2022-05-03 RX ADMIN — SODIUM CHLORIDE, PRESERVATIVE FREE 30 ML: 5 INJECTION INTRAVENOUS at 22:00

## 2022-05-03 RX ADMIN — LEVETIRACETAM 500 MG: 100 INJECTION, SOLUTION, CONCENTRATE INTRAVENOUS at 01:05

## 2022-05-03 RX ADMIN — ACETAMINOPHEN 650 MG: 650 SUPPOSITORY RECTAL at 21:09

## 2022-05-03 RX ADMIN — LEVETIRACETAM 500 MG: 100 INJECTION, SOLUTION, CONCENTRATE INTRAVENOUS at 13:59

## 2022-05-03 RX ADMIN — SODIUM CHLORIDE, POTASSIUM CHLORIDE, SODIUM LACTATE AND CALCIUM CHLORIDE 75 ML/HR: 600; 310; 30; 20 INJECTION, SOLUTION INTRAVENOUS at 18:53

## 2022-05-03 RX ADMIN — ZIPRASIDONE MESYLATE 5 MG: 20 INJECTION, POWDER, LYOPHILIZED, FOR SOLUTION INTRAMUSCULAR at 06:37

## 2022-05-03 RX ADMIN — SODIUM CHLORIDE, PRESERVATIVE FREE 10 ML: 5 INJECTION INTRAVENOUS at 16:07

## 2022-05-03 RX ADMIN — ONDANSETRON HYDROCHLORIDE 4 MG: 2 SOLUTION INTRAMUSCULAR; INTRAVENOUS at 05:14

## 2022-05-03 RX ADMIN — CEFTRIAXONE SODIUM 1 G: 1 INJECTION, POWDER, FOR SOLUTION INTRAMUSCULAR; INTRAVENOUS at 12:37

## 2022-05-03 RX ADMIN — QUETIAPINE FUMARATE 50 MG: 25 TABLET ORAL at 17:19

## 2022-05-03 RX ADMIN — METOCLOPRAMIDE HYDROCHLORIDE 5 MG: 5 INJECTION INTRAMUSCULAR; INTRAVENOUS at 22:36

## 2022-05-03 RX ADMIN — DEXMEDETOMIDINE HYDROCHLORIDE 0.7 MCG/KG/HR: 4 INJECTION, SOLUTION INTRAVENOUS at 05:12

## 2022-05-03 RX ADMIN — LOSARTAN POTASSIUM 50 MG: 50 TABLET, FILM COATED ORAL at 16:10

## 2022-05-03 RX ADMIN — QUETIAPINE FUMARATE 50 MG: 25 TABLET ORAL at 10:21

## 2022-05-03 RX ADMIN — PIPERACILLIN SODIUM AND TAZOBACTAM SODIUM 3.38 G: 3; 375 INJECTION, POWDER, FOR SOLUTION INTRAVENOUS at 05:12

## 2022-05-03 RX ADMIN — SODIUM CHLORIDE, PRESERVATIVE FREE 10 ML: 5 INJECTION INTRAVENOUS at 05:18

## 2022-05-03 RX ADMIN — DEXMEDETOMIDINE HYDROCHLORIDE 0.4 MCG/KG/HR: 4 INJECTION, SOLUTION INTRAVENOUS at 22:54

## 2022-05-03 RX ADMIN — SODIUM CHLORIDE, POTASSIUM CHLORIDE, SODIUM LACTATE AND CALCIUM CHLORIDE 75 ML/HR: 600; 310; 30; 20 INJECTION, SOLUTION INTRAVENOUS at 03:44

## 2022-05-03 NOTE — PROGRESS NOTES
0730: Bedside and Verbal shift change report given to Lawanda Robles RN (oncoming nurse) by Joni Negron RN (offgoing nurse). Report included the following information SBAR, Kardex, ED Summary, Intake/Output, MAR, Accordion, Recent Results, Med Rec Status, Cardiac Rhythm Sinus Cleophus Lacks, Alarm Parameters  and Dual Neuro Assessment.      1015: Spoke with dylan Srivastava to reduce neuro checks to q1 and transfer out of ICU from his stand point

## 2022-05-03 NOTE — PROGRESS NOTES
Occupational therapy  05/03/22     OT eval order received and acknowledged. Chart reviewed in prep and discussed with nursing, patient remains inappropriate for participation with therapy at this time, not following commands. Patient is Malagasy speaking and when  use, patient speaking words nonsensically. Will continue to follow patient and attempt OT eval at a later time when medically appropriate.      Thank you,  Sancho Wagner, OTR/L

## 2022-05-03 NOTE — PROGRESS NOTES
[facility]    ICU TEAM Progress Note    Name: Khari Mims   : 1955   MRN: [id number]   Date: 5/3/2022        HPI  Symone Benton is a 79-year-old female with PMHx of schizophrenia who presents to the ED as a transfer from an OSH ED for right-sided mixed density right parietal parenchymal hemorrhage. Per chart review, her symptoms began yesterday morning with left-sided hemiparesis. Family brought her to the OSH today after she became more lethargic with AMS. Neurosurgery evaluated patient and no need for neurosurgical intervention at this time. She was started on Cardene in the ED for hypertension and Precedex for agitation. OSH was unable to perform CTA due to agitation. Will attempt CTA while on Precedex. She will be admitted to the ICU for further medical management. Reason for ICU Admission: ICH    Subjective:   Overnight Events:   5/3/2022  On precedex drip  Reviewed neurology and neurosurgery notes    Hospital Course  : Admitted to ICU on Cardene and Precedex infusions.  : on precedex drip, sedated, does not follow any commands  5/3 : remains on precedex  MRI of brain last night   POD:   IMPRESSION  1. Stable large right superior frontoparietal acute intraparenchymal hemorrhage  and mild surrounding edema, with mass effect resulting in partial effacement of  the right lateral ventricle and 4 mm of leftward midline shift. No evidence of  underlying mass. 2. Stable trace acute subdural hemorrhage along the falx, and stable trace  intraventricular hemorrhage. The ventricles remain normal in size.     * No surgery found *    S/P:            ICU Assessment and Plan:     ICH  HTN  Acute encephalopathy  Schizophrenia         ICU Comprehensive Plan of Care:     1. Neurological System    Spontaneous Awakening Trial: N/A  Sedation: Precedex  Analgesia: Acetaminophen   Place NGT and start zyprexa and seroquel  Attempt to taper off precedex    2.  Cardiovascular System    SBP Goal of: > 90 mmHg and < 160 mmHg  MAP Goal of: > 65 mmHg  Nicardipine (Cardene) - For above SBP/MAP goals  Transfusion Trigger (Hgb): <7 g/dL  Keep K > 4; Mg > 2     3. Respiratory System  Incentive spirometry  Spontaneous Breathing Trial: N/A  Pulmonary toilet: Incentive Spirometry   SpO2 Goal: > 92%  DVT Prophylaxis: SCD's or Sequential Compression Device     4. Renal/GI/Endocrine System  IVFs:   Ulcer Prophylaxis: Not at this time   Bowel Regimen: MiraLax  Feeding:  No NPO  Blood Sugar Goal 120-180 - Glycemic Control: Insulin    5. Infectious Disease : no acute issues at this time    Indwelling Catheter:  Tubes: None  Lines: Peripheral IV  Drains: None  D - De-escalation of Antibiotics:   None    6. PT/OT: PT consulted and on board, OT consulted and on board and Speech therapy consulted and on board     7. Goals of Care Discussion with family Pending   8. Palliative care consult    9. Plan of Care/Code Status: DNI    10. Discussed Care Plan with Bedside RN    11. Documentation of Current Medications      Active Problem List:     Problem List  Date Reviewed: 6/9/2015          Codes Class    Hemorrhagic stroke [facility]) ICD-10-CM: I61.9  ICD-9-CM: 088         [facility]iz[facility]ffective [facility] ([facility] ICD-10-CM: F25.9  ICD-9-CM: 295.70               Past Medical History:      has a past medical history of Psychotic disorder. Past Surgical History:      has a past surgical history that includes hx tubal ligation.     Home Medications:     Prior to Admission medications    Not on File       Allergies/Social/Family History:     No Known Allergies   Social History     Tobacco Use    Smoking status: Never Smoker    Smokeless tobacco: Not on file   Substance Use Topics    Alcohol use: No      Family History   Problem Relation Age of Onset    Diabetes Neg Hx     Heart Disease Neg Hx     Psychiatric Disorder Neg Hx        Review of Systems:     Review of Systems   Unable to perform ROS: Acuity of condition       Objective:   Vital Signs: Visit Vitals  BP (!) 115/56   Pulse (!) 48   Temp 97.8 °F (36.6 °C)   Resp 16   Ht 4' 7\" (1.397 m)   Wt 48.5 kg (107 lb)   SpO2 96%   BMI 24.87 kg/m²      O2 Device: None (Room air) Temp (24hrs), Av.4 °F (37.4 °C), Min:97.6 °F (36.4 °C), Max:101.6 °F (38.7 °C)           Intake/Output:     Intake/Output Summary (Last 24 hours) at 5/3/2022 0958  Last data filed at 5/3/2022 0900  Gross per 24 hour   Intake 2332.06 ml   Output 450 ml   Net 1882.06 ml       Physical Exam:  HEENT: normal  Heart: s1,s2  Lungs: clear  Abd: soft  Ext: no edema  Cns: cannot assess, deeply sedated on precedex as earlier she was very agitated      LABS AND  DATA: Personally reviewed  Recent Labs     22   WBC 11.6* 13.1*   HGB 10.0* 12.1   HCT 31.2* 37.4    243     Recent Labs     22  2219    138   K 4.7 3.3*    105   CO2 24 25   BUN 8 9   CREA 0.73 0.72   * 139*   CA 8.1* 8.8     No results for input(s): AP, TBIL, TP, ALB, GLOB, AML, LPSE in the last 72 hours. No lab exists for component: SGOT, GPT, AMYP  Recent Labs     22  2328   INR 1.0   PTP 10.9   APTT 26.0      No results for input(s): PHI, PCO2I, PO2I, FIO2I in the last 72 hours. No results for input(s): CPK, CKMB, TROIQ, BNPP in the last 72 hours. Imagin/3/2022       Chest X-Ray:  CXR Results  (Last 48 hours)               22 1759  XR CHEST PORT Final result    Impression:  No acute process. Narrative: Indication: Left-sided weakness, vomiting       Comparison: None       Portable exam of the chest obtained at 1758 demonstrates normal heart size. There is no acute process in the lung fields. The osseous structures are   unremarkable.                    ECHO:  pending    MEDS: Reviewed    Multidisciplinary Rounds Completed:  Pending   ABCDEF Bundle/Checklist Completed:  Yes    SPECIAL EQUIPMENT  None    DISPOSITION  Stay in ICU    CRITICAL CARE CONSULTANT NOTE  I had a face to face encounter with the patient, reviewed and interpreted patient data including clinical events, labs, images, vital signs, I/O's, and examined patient. I have discussed the case and the plan and management of the patient's care with the consulting services, the bedside nurses and the respiratory therapist.      NOTE OF PERSONAL INVOLVEMENT IN CARE   This patient has a high probability of imminent, clinically significant deterioration, which requires the highest level of preparedness to intervene urgently. I participated in the decision-making and personally managed or directed the management of the following life and organ supporting interventions that required my frequent assessment to treat or prevent imminent deterioration. I personally spent 40 minutes of critical care time. This is time spent at this critically ill patient's bedside actively involved in patient care as well as the coordination of care. This does not include any procedural time which has been billed separately.     [address] [place]  5/3/2022

## 2022-05-03 NOTE — PROGRESS NOTES
1930 - Bedside and Verbal shift change report given to Rod Davenport (oncoming nurse) by Rex Beth (offgoing nurse). Report included the following information SBAR, Kardex, ED Summary, Procedure Summary, Intake/Output, MAR, Recent Results, Cardiac Rhythm NSR/SB, Alarm Parameters  and Dual Neuro Assessment. 2030 Nina Nichols NP at bedside to discuss with updates with family.  used to communicate with daughter Winnie Palomo at bedside. MRI screening form completed with  at bedside with Winnie Palomo. 18 - Pt taken to MRI with nurse and on the monitor. 0600 - Np Deysi notified of increased agitation. Order for Geodon acknowledged. 0730 - Bedside and Verbal shift change report given to [address] (oncoming nurse) by Manuel Fitzgerald RN (offgoing nurse). Report included the following information SBAR, Kardex, ED Summary, Procedure Summary, Intake/Output, MAR, Recent Results, Cardiac Rhythm NSR/SB, Alarm Parameters  and Dual Neuro Assessment.

## 2022-05-03 NOTE — CONSULTS
Palliative Medicine Consult  Darrin: [phone] ([phone])    Patient Name: Khari Mims  YOB: 1955    Date of Initial Consult: May 3, 2022  Reason for Consult: Care Decisions  Requesting Provider: Dr. Haim Jacobs  Primary Care Physician: None     SUMMARY:   Khari Mims is a 78 y.o. Azeri-speaking female with a past history of schizophrenia, ?hypertension, who was admitted on 5/1/2022 from [facility] emergency department with a diagnosis of right-sided mixed density right parietal parenchymal hemorrhage, left sided hemiparesis, AMS. Seen by neurosurgery no intervention recommended at this time. Current medical issues leading to Palliative Medicine involvement include: We have been asked to address care goals. DNI   PALLIATIVE DIAGNOSES:   1. Palliative care encounter  2. Acute hemorrhagic stroke  3. Agitation  4. Feeding difficulties  5. Language barrier  6. Debility  7. Goals of care     PLAN:   1. Patient seen without family present. She is unable to provide any information due to her medical condition  2. Will work with case management to determine her next of kin and schedule meeting for care goals. Daughter has been here to visit. There is a spouse listed on file but daughter says the patient is not . Daughter's phone number not in record at this time. 3. Initial consult note routed to primary continuity provider and/or primary health care team members  4. Communicated plan of care with: Palliative [facility] Team     GOALS OF CARE / TREATMENT PREFERENCES:     GOALS OF CARE:  Patient/Health Care Proxy Stated Goals:  Other (comment) (to be discussed)    TREATMENT PREFERENCES:   Code Status: Partial Code    Patient and family's personal goals include: UTO    Important upcoming milestones or family events: UTO    The patient identifies the following as important for living well: UTO      Advance Care Planning:  [x] The [facility] Interdisciplinary Team has updated the ACP Navigator with Health Care Decision Maker and Patient Capacity      Primary Decision Maker (Active): Brad Scot - Child  No flowsheet data found. Other:    As far as possible, the palliative care team has discussed with patient / health care proxy about goals of care / treatment preferences for patient. HISTORY:     History obtained from: Chart, team    CHIEF COMPLAINT: Patient admitted with acute hemorrhagic stroke    HPI/SUBJECTIVE:    The patient is:   [] Verbal and participatory  [x] Non-participatory due to:   Medical condition      Clinical Pain Assessment (nonverbal scale for severity on nonverbal patients):   Clinical Pain Assessment  Severity: 0     Activity (Movement): Lying quietly, normal position    Duration: for how long has pt been experiencing pain (e.g., 2 days, 1 month, years)  Frequency: how often pain is an issue (e.g., several times per day, once every few days, constant)     FUNCTIONAL ASSESSMENT:     Palliative Performance Scale (PPS):  PPS: 20       PSYCHOSOCIAL/SPIRITUAL SCREENING:     Palliative IDT has assessed this patient for cultural preferences / practices and a referral made as appropriate to needs (Cultural Services, Patient Advocacy, Ethics, etc.)    Any spiritual / Tenriism concerns:  [] Yes /  [] No UTO  If \"Yes\" to discuss with pastoral care during IDT     Does caregiver feel burdened by caring for their loved one:   [] Yes /  [] No /  [x] No Caregiver Present    If \"Yes\" to discuss with social work during IDT    Anticipatory grief assessment: UTO  [] Normal  / [] Maladaptive     If \"Maladaptive\" to discuss with social work during IDT    ESAS Anxiety: Anxiety: 0    ESAS Depression:          REVIEW OF SYSTEMS:     Positive and pertinent negative findings in ROS are noted above in HPI. The following systems were [] reviewed / [x] unable to be reviewed as noted in HPI  Other findings are noted below.   Systems: constitutional, ears/nose/mouth/throat, respiratory, gastrointestinal, genitourinary, musculoskeletal, integumentary, neurologic, psychiatric, endocrine. Positive findings noted below. Modified ESAS Completed by: provider   Fatigue: 10 Drowsiness: 10     Pain: 0   Anxiety: 0       Dyspnea: 0                    PHYSICAL EXAM:     From RN flowsheet:  Wt Readings from Last 3 Encounters:   05/03/22 107 lb (48.5 kg)   06/09/15 117 lb (53.1 kg)   02/12/15 116 lb (52.6 kg)     Blood pressure 125/61, pulse (!) 54, temperature 97.8 °F (36.6 °C), resp. rate 17, height 4' 7\" (1.397 m), weight 107 lb (48.5 kg), SpO2 98 %. Pain Scale 1: Adult Nonverbal Pain Scale  Pain Intensity 1: 0                 Last bowel movement, if known:     Constitutional: Patient nonverbal, NAD, somnolent, well nourished  Eyes: Closed  ENMT: no nasal discharge, moist mucous membranes  Cardiovascular:  Respiratory: breathing not labored, symmetric  Gastrointestinal: soft non-tender  Musculoskeletal: no deformity, no tenderness to palpation  Skin: warm, dry  Neurologic: Acute stroke  Psychiatric: intermittent agitation  Other:       HISTORY:     Active Problems:    Hemorrhagic stroke ([facility] (5/1/2022)      Past Medical History:   Diagnosis Date    Psychotic disorder     concern for schizoaffective disorder      Past Surgical History:   Procedure Laterality Date    HX TUBAL LIGATION        Family History   Problem Relation Age of Onset    Diabetes Neg Hx     Heart Disease Neg Hx     Psychiatric Disorder Neg Hx       History reviewed, no pertinent family history.   Social History     Tobacco Use    Smoking status: Never Smoker    Smokeless tobacco: Not on file   Substance Use Topics    Alcohol use: No     No Known Allergies   Current Facility-Administered Medications   Medication Dose Route Frequency    QUEtiapine (SEROquel) tablet 50 mg  50 mg Oral BID    piperacillin-tazobactam (ZOSYN) 3.375 g in 0.9% sodium chloride (MBP/ADV) 100 mL MBP  3.375 g IntraVENous Q8H    lactated Ringers infusion  75 mL/hr IntraVENous CONTINUOUS    levETIRAcetam (KEPPRA) injection 500 mg  500 mg IntraVENous Q12H    labetaloL (NORMODYNE;TRANDATE) injection 10 mg  10 mg IntraVENous Q2H PRN    sodium chloride (NS) flush 5-40 mL  5-40 mL IntraVENous Q8H    sodium chloride (NS) flush 5-40 mL  5-40 mL IntraVENous PRN    acetaminophen (TYLENOL) tablet 650 mg  650 mg Oral Q6H PRN    Or    acetaminophen (TYLENOL) suppository 650 mg  650 mg Rectal Q6H PRN    polyethylene glycol (MIRALAX) packet 17 g  17 g Oral DAILY PRN    ondansetron (ZOFRAN ODT) tablet 4 mg  4 mg Oral Q8H PRN    Or    ondansetron (ZOFRAN) injection 4 mg  4 mg IntraVENous Q6H PRN    dexmedeTOMidine in 0.9 % NaCl (PRECEDEX) 400 mcg/100 mL (4 mcg/mL) infusion soln  0.1-1.5 mcg/kg/hr IntraVENous TITRATE          LAB AND IMAGING FINDINGS:     Lab Results   Component Value Date/Time    WBC 11.6 (H) 05/02/2022 05:22 AM    HGB 10.0 (L) 05/02/2022 05:22 AM    PLATELET 051 66/81/1412 05:22 AM     Lab Results   Component Value Date/Time    Sodium 136 05/02/2022 05:22 AM    Potassium 4.7 05/02/2022 05:22 AM    Chloride 106 05/02/2022 05:22 AM    CO2 24 05/02/2022 05:22 AM    BUN 8 05/02/2022 05:22 AM    Creatinine 0.73 05/02/2022 05:22 AM    Calcium 8.1 (L) 05/02/2022 05:22 AM      Lab Results   Component Value Date/Time    Alk.  phosphatase 87 01/15/2015 11:40 AM    Protein, total 8.1 01/15/2015 11:40 AM    Albumin 4.6 01/15/2015 11:40 AM    Globulin 3.5 01/15/2015 11:40 AM     Lab Results   Component Value Date/Time    INR 1.0 05/01/2022 11:28 PM    Prothrombin time 10.9 05/01/2022 11:28 PM    aPTT 26.0 05/01/2022 11:28 PM      No results found for: IRON, FE, TIBC, IBCT, PSAT, FERR   No results found for: PH, PCO2, PO2  No components found for: GLPOC   No results found for: CPK, CKMB             Total time: 50 minutes  Counseling / coordination time, spent as noted above: 40 minutes  > 50% counseling / coordination?: y    Prolonged service was provided for  []30 min   []75 min in face to face time in the presence of the patient, spent as noted above. Time Start:   Time End:   Note: this can only be billed with 82961 (initial) or 59503 (follow up). If multiple start / stop times, list each separately.

## 2022-05-03 NOTE — PROGRESS NOTES
Comprehensive Nutrition Assessment    Type and Reason for Visit: Consult,Initial    Nutrition Recommendations/Plan:      Check DHT placement 5/4-start EN if post-pyloric and unable to advance diet     -Tube feeding goal: Osmolite 1.2 @ 45 ml/hr with 70 ml water flush q 4 hr    Advance diet per SLP    Nutrition Assessment:    Pt admitted with Hemorrhagic stroke. PMHx: Schizophrenia. ICH with left hemiparesis. Confused with agitation; weaned off Precedex today. Unable to complete SLP evaluation d/t AMS. DHT placed today. Patient appears well-nourished. Last weight in EHR from 2017 indicates approximate 10# weight loss in 5 years. No family in room. Due to patient moving in bed/laying flat most of the time recommend starting tube feeding once tube is post-pyloric or once pt able to stay elevated/HOB >30 degrees. Recommended goal tube feeding: Osmolite 1.2 @ 45 ml/hr with 70 ml water flush q 4 hr. This will provide 990 ml, ~1200 calories, 55 gm protein and 1230 ml free water (tube feeding/flush) per day to meet estimated needs. Malnutrition Assessment:  Malnutrition Status:  Insufficient data    Context:  Acute illness     Findings of the 6 clinical characteristics of  malnutrition:   Energy Intake:  Unable to assess  Weight Loss:  Unable to assess     Body Fat Loss:  No significant body fat loss,     Muscle Mass Loss:  No significant muscle mass loss,    Fluid Accumulation:  No significant fluid accumulation,     Strength:  Not performed     Nutritionally Significant Medications:   LR @ 75 ml/hr, Miralax PRN    Estimated Daily Nutrient Needs:  Energy (kcal): 1210 (25 kcal/kg); Weight Used for Energy Requirements: Current  Protein (g): 49-59 (1.0-1.2g/kg);  Weight Used for Protein Requirements: Current  Fluid (ml/day): Method Used for Fluid Requirements: 1 ml/kcal    Nutrition Related Findings:       BM: PTA  Edema: None  Wounds:  None       Current Nutrition Therapies:   Diet: NPO  Additional Caloric Sources: None    Anthropometric Measures:  · Height:  4' 7\" (139.7 cm)  · Current Body Wt:  48.5 kg (106 lb 14.8 oz)    · Ideal Body Wt:  75:  142.6 %     · BMI Categories:  Normal weight (BMI 22.0-24.9) age over 72     Wt Readings from Last 10 Encounters:   05/03/22 48.5 kg (107 lb)   06/09/15 53.1 kg (117 lb)   02/12/15 52.6 kg (116 lb)   01/22/15 53.5 kg (118 lb)   01/15/15 53.1 kg (117 lb)   12/04/14 54 kg (119 lb)   10/30/14 54.3 kg (119 lb 12.8 oz)       Nutrition Diagnosis:   · Inadequate oral intake related to cognitive or neurological impairment as evidenced by NPO or clear liquid status due to medical condition      Nutrition Interventions:   Food and/or Nutrient Delivery: Continue NPO  Nutrition Education and Counseling: No recommendations at this time  Coordination of Nutrition Care: Continue to monitor while inpatient    Goals:  Initiate EN support in next 3-4 days if unable to advance diet. Nutrition Monitoring and Evaluation:   Behavioral-Environmental Outcomes: None identified  Food/Nutrient Intake Outcomes: Enteral nutrition intake/tolerance,Diet advancement/tolerance  Physical Signs/Symptoms Outcomes: Biochemical data,Weight,GI status    Discharge Planning:     Too soon to determine     Mitchell Terry RD CNSC  Contact: Umberto Harris

## 2022-05-03 NOTE — PROGRESS NOTES
Physician Progress Note      Alvaro Henley  [facility] #:                  [id number]  :                       1955  ADMIT DATE:       2022 7:04 PM  [address] [place] [place] DATE:  RESPONDING  PROVIDER #:        Brian Martin MD        QUERY TEXT:    Type of Encephalopathy: Please provide further specificity, if known. Clinical indicators include: ams, acute, encephalopathy, infectious, alcohol use  Options provided:  -- Anoxic/hypoxic encephalopathy  -- Metabolic encephalopathy  -- Toxic encephalopathy  -- Hepatic encephalopathy  -- Hypertensive encephalopathy  -- Other - I will add my own diagnosis  -- Disagree - Not applicable / Not valid  -- Disagree - Clinically Unable to determine / Unknown        PROVIDER RESPONSE TEXT:    Provider was unable to determine a response for this query.       Electronically signed by:  Brian Martin MD 5/3/2022 2:28 PM

## 2022-05-03 NOTE — PROGRESS NOTES
Problem: Dysphagia (Adult)  Goal: *Acute Goals and Plan of Care (Insert Text)  Description: Speech Therapy Goals  Initiated 5/2/2022    1. Patient will participate in swallow re-evaluation within 7 days. Outcome: Not Progressing Towards Goal     SPEECH LANGUAGE PATHOLOGY DYSPHAGIA TREATMENT  Patient: Jorge L Bird (8 y.o. female)  Date: 5/3/2022  Diagnosis: Hemorrhagic stroke ([facility] [I61.9] <principal problem not specified>      Precautions:       ASSESSMENT:  Pt continues to be inappropriate for PO given mentation. Pt ultimately expectorating ice chips. Appreciate RN placing DHT     PLAN:  Recommendations and Planned Interventions:  --NPO with DHT  --Ice chips or water swabs if patient accepting    Patient continues to benefit from skilled intervention to address the above impairments. Continue treatment per established plan of care. Discharge Recommendations: To Be Determined     SUBJECTIVE:   Patient stated, \"Ahhhh when ice chip in patient's mouth. OBJECTIVE:   Cognitive and Communication Status:  Neurologic State: Confused  Orientation Level: Disoriented X4  Cognition: No command following  Pain:  Pain Scale 1: Adult Nonverbal Pain Scale  Pain Intensity 1: 2       After treatment:   Call bell within reach and Nursing notified    COMMUNICATION/EDUCATION:     The patient's plan of care including recommendations, planned interventions, and recommended diet changes were discussed with: Registered nurse.      Chauncey Bruner SLP  Time Calculation: 9 mins

## 2022-05-03 NOTE — PROCEDURES
ELECTROENCEPHALOGRAM REPORT     Patient Name: Philipp South  : 1955  Age: 78 y.o. Ordering physician: Makenzie Valdivia MD  Date of EEG: May 3rd, 2022  Interpreting physician: Makenzie Valdivia MD    PROCEDURE: Routine awake and drowsy video electroencephalogram (vEEG). CLINICAL INDICATION: The patient is a 78 y.o. female who is being evaluated for subclinical seizure. DESCRIPTION OF THE RECORD:   Throughout this epoch, background consists of admixed theta/delta with poor organization. No state changes are noted. No sleep architecture appreciated. No epileptiform discharges or electrographic seizures noted. Reactivity is present to mental stimuli. Single-lead ECG demonstrates normal sinus rhythm. INTERPRETATION:     This is an abnormal routine vEEG characterized by moderate to moderately severe diffuse cerebral dysfunction, nonspecific in etiology.       Emily Varghese MD

## 2022-05-03 NOTE — PROGRESS NOTES
Neurology Attending Addendum:    Overview, interval history and physical as documented by Ms. Bin Query reviewed and agree with its contents. On attempted examination today patient is some laying in bed moaning. HEENT is grossly unremarkable patient resist eye opening. Neck appears supple though she does scream when moving her head. Cardiopulmonary exams are unremarkable. Patient appears to be moving right side more than left, confrontational muscle strength testing is challenging. Examination is as otherwise documented below. Interpretation devices available in the room but after evaluating patient and her continuous moaning was not felt to be worthwhile. MRI was obtained did not demonstrate any notable vascular malformation any underlying tumor or evidence for stroke. Would suspect hypertensive etiology at this time. We will continue to maintain blood pressure less than 140/90. We will continue Keppra for 7 days as prophylaxis then stop (day 2 today). Will need PT OT, palliative care has been consulted which is reasonable. Neurology will continue to follow please call questions concerns      Neurology Progress Note  Nika Nielson NP    Admit Date: 5/1/2022   LOS: 2 days      Daily Progress Note: 5/3/2022    C/C: left-sided hemiparesis    HPI: Marlyn Lock is a Korean-speaking 78 y.o. F with a pmh of Schizophrenia who presented to [facility] EMERGENCY DEPT on the evening of 5/1 reporting left arm weakness, vomiting, difficulty speaking and inability to ambulate since 4/30/22. Per daughter, she attributed patient's symptoms to patient's mental health issues given her history of \"psych\" problems. [address] upon arrival showed  an acute right posterial parietal bleed of approx 57.5 ml with surrounding edema and 2 mm midline shift. Her BP was 158/120 on arrival. She was emergently transferred to [facility] [place] for higher level of care. Neurosurgery was consulted and there is no role for surgical intervention.  Neurology was consulted for hemorrhagic stroke management and work-up.     History obtained from the patient's daughter using a Croatian . The patient lives with her daughter who states patient has lots of behavioral issues secondary to her schizophrenia. Patient does not currently take any medications other than naproxen. Has no prior history of stroke. There were no reports of fall or trauma.      Pt was initially to be admitted to the hospitalist service but her BP went as high at 199/101 in the ED and she had to be started on an nicardipine gtt. Additionally, pt was provided 0.5 mg Ativan for agitation and then placed on a precedex gtt in the ED as well as right wrist restraints, necessitating a change in admission to ICU status. Subjective:   No acute events noted overnight. The patient appears drowsy but will moan and withdraw to all 4 ext to pain however minimal to L sided. Moves R sided spontaneous but not purposeful.  On Precedex which was increased to 0.9 prior to MRI and has now decreased to 0.5    No Known Allergies    Past Medical History:   Diagnosis Date    Psychotic disorder     concern for schizoaffective disorder     Family History   Problem Relation Age of Onset    Diabetes Neg Hx     Heart Disease Neg Hx     Psychiatric Disorder Neg Hx      Social History     Tobacco Use    Smoking status: Never Smoker    Smokeless tobacco: Not on file   Substance Use Topics    Alcohol use: No      None       Objective:   Vital signs  Temp (24hrs), Av °F (32.8 °C), Min:46 °F (7.8 °C), Max:101.6 °F (38.7 °C)   1901 -  0700  In: 737.6 [I.V.:737.6]  Out: 0    07 -  1900  In: 2796.3 [I.V.:2796.3]  Out: 1120 [Urine:1120]  Visit Vitals  BP (!) 92/51 (BP 1 Location: Left upper arm, BP Patient Position: At rest)   Pulse (!) 57   Temp (!) 46 °F (7.8 °C)   Resp 20   Ht 4' 7.32\" (1.405 m) Comment: From documentation on 10/30/2014   Wt 48.6 kg (107 lb 2.3 oz)   SpO2 94%   BMI 24.62 kg/m²      O2 Device: None (Room air)   Vitals:    05/02/22 2200 05/02/22 2230 05/02/22 2345 05/03/22 0000   BP: (!) 96/48 (!) 100/49 (!) 101/52 (!) 92/51   Pulse: 68 62 (!) 58 (!) 57   Resp: 23 25 19 20   Temp:    (!) 46 °F (7.8 °C)   SpO2: 93% 93% 95% 94%   Weight:       Height:          Meds:     Current Facility-Administered Medications   Medication Dose Route Frequency    piperacillin-tazobactam (ZOSYN) 3.375 g in 0.9% sodium chloride (MBP/ADV) 100 mL MBP  3.375 g IntraVENous Q8H    lactated Ringers infusion  75 mL/hr IntraVENous CONTINUOUS    levETIRAcetam (KEPPRA) injection 500 mg  500 mg IntraVENous Q12H    labetaloL (NORMODYNE;TRANDATE) injection 10 mg  10 mg IntraVENous Q2H PRN    sodium chloride (NS) flush 5-40 mL  5-40 mL IntraVENous Q8H    sodium chloride (NS) flush 5-40 mL  5-40 mL IntraVENous PRN    acetaminophen (TYLENOL) tablet 650 mg  650 mg Oral Q6H PRN    Or    acetaminophen (TYLENOL) suppository 650 mg  650 mg Rectal Q6H PRN    polyethylene glycol (MIRALAX) packet 17 g  17 g Oral DAILY PRN    ondansetron (ZOFRAN ODT) tablet 4 mg  4 mg Oral Q8H PRN    Or    ondansetron (ZOFRAN) injection 4 mg  4 mg IntraVENous Q6H PRN    niCARdipine (CARDENE) 25 mg in 0.9% sodium chloride 250 mL infusion  0-15 mg/hr IntraVENous TITRATE    dexmedeTOMidine in 0.9 % NaCl (PRECEDEX) 400 mcg/100 mL (4 mcg/mL) infusion soln  0.1-1.5 mcg/kg/hr IntraVENous TITRATE     I personally reviewed all of the medications    Review of Systems:   The patient is too altered and cognitively or communication impaired to participate in ROS. Physical Exam:   Blood pressure (!) 92/51, pulse (!) 57, temperature (!) 46 °F (7.8 °C), resp. rate 20, height 4' 7.32\" (1.405 m), weight 48.6 kg (107 lb 2.3 oz), SpO2 94 %. GEN: NAD, cooperative, calm  HEENT: Normocephalic.  Non-icter, no congestion  Lungs:  CTA bilaterally   Cardiac: S1,S2, normal rate and rhythm, no carotid bruits, no gallops  Abdomen: hypoactive bowel sounds, no distention, non-tender  Extremities: no clubbing, cyanosis, or edema  Skin: no rashes or lesions noted      NEURO: Not intubated but sedated with Precedex of 0.5mcg  Mental status: appears drowsy. unble to follow commands   Cranial Nerves: PERRL, 3mm and midline; attempt to resist eye opening during exams. + visual threat. Unable to assess speech due to increased drowsiness. Noted mild facial asymmetry. Motor:  Normal bulk and tone, withdraws x4 ext to pain but minimal on L sided and will move the R side spontaneously. No involuntary movements. Reflexes:  +2 throughout, up going L toes and down going R toes  Sensation: Intact x 4 extremities to pain  Gait:  Deferred     Labs/images:     Lab Results   Component Value Date/Time    WBC 11.6 (H) 05/02/2022 05:22 AM    Hemoglobin (POC) 11.6 10/30/2014 08:56 AM    HGB 10.0 (L) 05/02/2022 05:22 AM    HCT 31.2 (L) 05/02/2022 05:22 AM    PLATELET 154 52/71/7032 05:22 AM    MCV 92.9 05/02/2022 05:22 AM      Lab Results   Component Value Date/Time    Sodium 136 05/02/2022 05:22 AM    Potassium 4.7 05/02/2022 05:22 AM    Chloride 106 05/02/2022 05:22 AM    CO2 24 05/02/2022 05:22 AM    Anion gap 6 05/02/2022 05:22 AM    Glucose 135 (H) 05/02/2022 05:22 AM    BUN 8 05/02/2022 05:22 AM    Creatinine 0.73 05/02/2022 05:22 AM    BUN/Creatinine ratio 11 (L) 05/02/2022 05:22 AM    GFR est AA >60 05/02/2022 05:22 AM    GFR est non-AA >60 05/02/2022 05:22 AM    Calcium 8.1 (L) 05/02/2022 05:22 AM    Bilirubin, total 0.7 01/15/2015 11:40 AM    Alk.  phosphatase 87 01/15/2015 11:40 AM    Protein, total 8.1 01/15/2015 11:40 AM    Albumin 4.6 01/15/2015 11:40 AM    Globulin 3.5 01/15/2015 11:40 AM    A-G Ratio 1.3 01/15/2015 11:40 AM    ALT (SGPT) 17 01/15/2015 11:40 AM    AST (SGOT) 14 (L) 01/15/2015 11:40 AM       CT Results (most recent):  Results from Hospital Encounter encounter on 05/01/22    CTA HEAD NECK W CONT    Narrative  *PRELIMINARY REPORT*    No large focal occlusion or aneurysm. No significant change in large right  parieto-occipital parenchymal hemorrhage with edema and localized mass effect or  in small parafalcine subdural hemorrhage. Preliminary report was provided by Dr. Louis Chance, the on-call radiologist, at  01:05    Final report to follow. *END PRELIMINARY REPORT*    FINAL REPORT:    CLINICAL HISTORY: Right intracranial hemorrhage    EXAMINATION:  CT ANGIOGRAPHY HEAD AND NECK    COMPARISON: 5/1/2022    TECHNIQUE:  Following the uneventful administration of iodinated contrast  material, axial CT angiography of the head and neck was performed. Delayed axial  images through the head were also obtained. Coronal and sagittal reconstructions  were obtained. Manual postprocessing of images was performed. 3-D  Sagittal  maximal intensity projection images were obtained. 3-D Coronal maximal  intensity projections were obtained. CT dose reduction was achieved through use  of a standardized protocol tailored for this examination and automatic exposure  control for dose modulation. FINDINGS:    Delayed contrast-enhanced head CT:    No significant change in moderate right superior parieto-occipital  intraparenchymal hemorrhage with edema and mass effect and minimal subfalcine  herniation to the left. Stable size and appearance of the ventricles. No  evidence for new intracranial hemorrhage. The basal cisterns are patent. Mild  mucosal thickening in the right maxillary sinus. CTA NECK:    Great vessels: Patent origins    Right subclavian artery: Patent    Left subclavian artery: Patent    Right common carotid artery: Patent    Left common carotid artery: Patent    Cervical right internal carotid artery: Patent with no significant stenosis by  NASCET criteria. Cervical left internal carotid artery: Patent with no significant stenosis by  NASCET criteria.     Right vertebral artery: Patent    Left vertebral artery: Patent    CTA HEAD:    Right cavernous internal carotid artery: Patent    Left cavernous internal carotid artery: Patent    Anterior cerebral arteries: Patent    Anterior communicating artery: Patent    Right middle cerebral artery: Patent    Left middle cerebral artery: Patent    Posterior communicating arteries: Left is patent. Right is hypoplastic    Posterior cerebral arteries: Patent    Basilar artery: Patent    Distal vertebral arteries: Patent    No evidence for intracranial aneurysm    Measurements use NASCET criteria. Impression  No significant change in right superior parieto-occipital acute parenchymal  hemorrhage with edema and mass effect. No evidence for intracranial aneurysm. MRI Results (most recent):  Results from [place] encounter on 05/01/22    MRI BRAIN W WO CONT    Narrative  *PRELIMINARY REPORT*    Large right frontoparietal hemorrhage redemonstrated with surrounding edema. No  significant interval change or new acute findings. Preliminary report was provided by Dr. Julio C Wiley, the on-call radiologist, at  23:44    Final report to follow. *END PRELIMINARY REPORT*     Assessment:     Active Problems: ICH score: 2    Hemorrhagic stroke ([address] (5/1/2022)    Plan:     Preliminary MRI Brain showed Large right frontoparietal hemorrhage redemonstrated with surrounding edema. No significant interval change or new acute findings.     CTA showed no focal occlusion or aneurysm    · Suspect presumed etiology to be likely due to hypertensive angiopathy vs hemorrhagic stroke conversion  · Prn Cardene gtt/labetolol/hydralazine to maintain blood pressure less than 140/90  · Please avoid antiplatelet or any AC at this time  · rEEG completed; will await results  · In the interim, continue with Keppra 500 mg BID  · DVT ppx with SCD's  · Supportive care  · Stroke education to family at bedside  · PT/OT/ST  · NSGY following, no intervention at this time  · q1 neuro checks  · Pending Echo      Chart reviewed    Further neurology recommendations to follow by Dr. Heather Stokes     Case discussed with: intensivist, patient's daughter at bedside, and primary ICU nurse     >35% time spent in counseling or coordination of care of the above in the assessment and plan     Signed By: Brain Rao NP                    May 3, 2022    Please note that this dictation was completed with Maggie Peoples, the computer voice recognition software. Quite often unanticipated grammatical, syntax, homophones, and other interpretive errors are inadvertently transcribed by the computer software. Please disregard these errors. Please excuse any errors that have escaped final proofreading.

## 2022-05-03 NOTE — PROGRESS NOTES
Physical Therapy: Hold    Chart reviewed in prep for PT ryis.  Patient remains inappropriate for participation with therapy at this time, not following commands. Patient is Nepalese speaking and when  used, patient speaking words nonsensically. Will hold and follow up as able and medically appropriate.       Katty Soto, PT, DPT

## 2022-05-04 ENCOUNTER — APPOINTMENT (OUTPATIENT)
Dept: GENERAL RADIOLOGY | Age: 67
DRG: 064 | End: 2022-05-04
Attending: NURSE PRACTITIONER

## 2022-05-04 ENCOUNTER — APPOINTMENT (OUTPATIENT)
Dept: GENERAL RADIOLOGY | Age: 67
DRG: 064 | End: 2022-05-04
Attending: HOSPITALIST

## 2022-05-04 LAB
ANION GAP SERPL CALC-SCNC: 12 MMOL/L (ref 5–15)
BASOPHILS # BLD: 0.1 K/UL (ref 0–0.1)
BASOPHILS NFR BLD: 1 % (ref 0–1)
BUN SERPL-MCNC: 9 MG/DL (ref 6–20)
BUN/CREAT SERPL: 16 (ref 12–20)
CALCIUM SERPL-MCNC: 8.2 MG/DL (ref 8.5–10.1)
CHLORIDE SERPL-SCNC: 111 MMOL/L (ref 97–108)
CO2 SERPL-SCNC: 17 MMOL/L (ref 21–32)
CREAT SERPL-MCNC: 0.57 MG/DL (ref 0.55–1.02)
DIFFERENTIAL METHOD BLD: ABNORMAL
EOSINOPHIL # BLD: 0.1 K/UL (ref 0–0.4)
EOSINOPHIL NFR BLD: 1 % (ref 0–7)
ERYTHROCYTE [DISTWIDTH] IN BLOOD BY AUTOMATED COUNT: 12.9 % (ref 11.5–14.5)
GLUCOSE SERPL-MCNC: 82 MG/DL (ref 65–100)
HCT VFR BLD AUTO: 31.4 % (ref 35–47)
HGB BLD-MCNC: 10.3 G/DL (ref 11.5–16)
IMM GRANULOCYTES # BLD AUTO: 0 K/UL (ref 0–0.04)
IMM GRANULOCYTES NFR BLD AUTO: 0 % (ref 0–0.5)
LYMPHOCYTES # BLD: 1.6 K/UL (ref 0.8–3.5)
LYMPHOCYTES NFR BLD: 15 % (ref 12–49)
MCH RBC QN AUTO: 29.8 PG (ref 26–34)
MCHC RBC AUTO-ENTMCNC: 32.8 G/DL (ref 30–36.5)
MCV RBC AUTO: 90.8 FL (ref 80–99)
MONOCYTES # BLD: 0.9 K/UL (ref 0–1)
MONOCYTES NFR BLD: 9 % (ref 5–13)
NEUTS SEG # BLD: 7.7 K/UL (ref 1.8–8)
NEUTS SEG NFR BLD: 74 % (ref 32–75)
NRBC # BLD: 0 K/UL (ref 0–0.01)
NRBC BLD-RTO: 0 PER 100 WBC
PLATELET # BLD AUTO: 210 K/UL (ref 150–400)
PMV BLD AUTO: 10.8 FL (ref 8.9–12.9)
POTASSIUM SERPL-SCNC: 3.1 MMOL/L (ref 3.5–5.1)
RBC # BLD AUTO: 3.46 M/UL (ref 3.8–5.2)
SODIUM SERPL-SCNC: 140 MMOL/L (ref 136–145)
WBC # BLD AUTO: 10.4 K/UL (ref 3.6–11)

## 2022-05-04 PROCEDURE — 74011000250 HC RX REV CODE- 250: Performed by: INTERNAL MEDICINE

## 2022-05-04 PROCEDURE — 74018 RADEX ABDOMEN 1 VIEW: CPT

## 2022-05-04 PROCEDURE — 74011250637 HC RX REV CODE- 250/637: Performed by: INTERNAL MEDICINE

## 2022-05-04 PROCEDURE — 74011000250 HC RX REV CODE- 250: Performed by: NURSE PRACTITIONER

## 2022-05-04 PROCEDURE — 74011250636 HC RX REV CODE- 250/636: Performed by: NURSE PRACTITIONER

## 2022-05-04 PROCEDURE — 80048 BASIC METABOLIC PNL TOTAL CA: CPT

## 2022-05-04 PROCEDURE — 99233 SBSQ HOSP IP/OBS HIGH 50: CPT | Performed by: PSYCHIATRY & NEUROLOGY

## 2022-05-04 PROCEDURE — 77030040704 HC NSL TU RETAIN SYS BRDL PRO AMR -B

## 2022-05-04 PROCEDURE — 36415 COLL VENOUS BLD VENIPUNCTURE: CPT

## 2022-05-04 PROCEDURE — 65270000046 HC RM TELEMETRY

## 2022-05-04 PROCEDURE — 92526 ORAL FUNCTION THERAPY: CPT

## 2022-05-04 PROCEDURE — 74011250637 HC RX REV CODE- 250/637: Performed by: NURSE PRACTITIONER

## 2022-05-04 PROCEDURE — 74011250636 HC RX REV CODE- 250/636: Performed by: PSYCHIATRY & NEUROLOGY

## 2022-05-04 PROCEDURE — 74011250636 HC RX REV CODE- 250/636: Performed by: INTERNAL MEDICINE

## 2022-05-04 PROCEDURE — 85025 COMPLETE CBC W/AUTO DIFF WBC: CPT

## 2022-05-04 PROCEDURE — 77030004950 HC CATH ENTRL NG COVD -A

## 2022-05-04 RX ORDER — POTASSIUM CHLORIDE 14.9 MG/ML
10 INJECTION INTRAVENOUS
Status: DISPENSED | OUTPATIENT
Start: 2022-05-04 | End: 2022-05-04

## 2022-05-04 RX ADMIN — SODIUM CHLORIDE, PRESERVATIVE FREE 10 ML: 5 INJECTION INTRAVENOUS at 23:22

## 2022-05-04 RX ADMIN — CEFTRIAXONE SODIUM 1 G: 1 INJECTION, POWDER, FOR SOLUTION INTRAMUSCULAR; INTRAVENOUS at 13:01

## 2022-05-04 RX ADMIN — POTASSIUM CHLORIDE 10 MEQ: 14.9 INJECTION, SOLUTION INTRAVENOUS at 06:25

## 2022-05-04 RX ADMIN — QUETIAPINE FUMARATE 50 MG: 25 TABLET ORAL at 08:35

## 2022-05-04 RX ADMIN — WATER 10 MG: 1 INJECTION INTRAMUSCULAR; INTRAVENOUS; SUBCUTANEOUS at 03:24

## 2022-05-04 RX ADMIN — LOSARTAN POTASSIUM 50 MG: 50 TABLET, FILM COATED ORAL at 08:35

## 2022-05-04 RX ADMIN — LEVETIRACETAM 500 MG: 100 INJECTION, SOLUTION, CONCENTRATE INTRAVENOUS at 13:01

## 2022-05-04 RX ADMIN — POTASSIUM BICARBONATE 40 MEQ: 782 TABLET, EFFERVESCENT ORAL at 06:44

## 2022-05-04 RX ADMIN — SODIUM CHLORIDE, POTASSIUM CHLORIDE, SODIUM LACTATE AND CALCIUM CHLORIDE 75 ML/HR: 600; 310; 30; 20 INJECTION, SOLUTION INTRAVENOUS at 10:00

## 2022-05-04 RX ADMIN — SODIUM CHLORIDE, PRESERVATIVE FREE 20 ML: 5 INJECTION INTRAVENOUS at 06:00

## 2022-05-04 RX ADMIN — SODIUM CHLORIDE, PRESERVATIVE FREE 10 ML: 5 INJECTION INTRAVENOUS at 13:02

## 2022-05-04 RX ADMIN — SODIUM CHLORIDE, POTASSIUM CHLORIDE, SODIUM LACTATE AND CALCIUM CHLORIDE 75 ML/HR: 600; 310; 30; 20 INJECTION, SOLUTION INTRAVENOUS at 16:01

## 2022-05-04 RX ADMIN — LEVETIRACETAM 500 MG: 100 INJECTION, SOLUTION, CONCENTRATE INTRAVENOUS at 02:23

## 2022-05-04 NOTE — PROGRESS NOTES
Bedside shift change report received from Leonid Pugh, [facility]. Report included the following information SBAR, Kardex, Procedure Summary, Intake/Output, MAR and Recent Results. 2000: Spoke w/ daughter using tranlator (#[id number]) to update her, address questions. Per daughter, pt c/o back pain all over. Order received from lidocaine patch, tylenol given. 1595: Dobhoff repositioned and able to flush. Will get KUB. 0515: New Dobhoff placed at 65cm. KUB ordered.

## 2022-05-04 NOTE — PROGRESS NOTES
PALLIATIVE MEDICINE              Left message for daughter with my contact information. Will follow up tomorrow. Juma Mehta.  [address] MSN, FNP-BC, [facility]

## 2022-05-04 NOTE — PROGRESS NOTES
Problem: Dysphagia (Adult)  Goal: *Acute Goals and Plan of Care (Insert Text)  Description: Speech Therapy Goals  Initiated 5/2/2022    1. Patient will participate in swallow re-evaluation within 7 days. Outcome: Not Progressing Towards Goal    SPEECH LANGUAGE PATHOLOGY DYSPHAGIA TREATMENT  Patient: Daniel Pierre (28 y.o. female)  Date: 5/4/2022  Diagnosis: Hemorrhagic stroke ([facility] [address] [I61.9] <principal problem not specified>      Precautions:       ASSESSMENT:  Pt accepting PO on this date (applesauce) but with oral holding and ultimately with no swallow initiation. Recommend NPO with ice chips/water swabs as pt accepts. Will hold further SLP until mentation and pt ability to participate improves. PLAN:  Recommendations and Planned Interventions:  --NPO with ice chips/water swabs as pt accepts  --continue DHT  --appreciate palliative    Patient continues to benefit from skilled intervention to address the above impairments. Continue treatment per established plan of care. Discharge Recommendations: To Be Determined     SUBJECTIVE:   Patient stated, \"Aiaiai. OBJECTIVE:   Cognitive and Communication Status:  Neurologic State: Alert,Restless,Agitated  Orientation Level: Disoriented X4 (did not answer questions with  )  Cognition: Impaired decision making,Poor safety awareness       After treatment:   Call bell within reach and Nursing notified    COMMUNICATION/EDUCATION:     The patient's plan of care including recommendations, planned interventions, and recommended diet changes were discussed with: MARY Mason, SLP  Time Calculation: 10 mins

## 2022-05-04 NOTE — PROGRESS NOTES
Neurology Attending Addendum:    Overview, interval history and physical as documented by Ms. Jennifer Benton reviewed and agree with its contents except were noted. Translation was again not attempted today due to impression of not being useful secondary to patient condition. On exam patient appears restless is laying sideways in the bed moaning nonsensically. Examination demonstrates largely flaccid left arm and leg does not move or withdraw to noxious stimuli. Right arm and leg move freely. At this juncture patient has been monitored for several days by the neurology service, repeat imaging has failed to demonstrate any vascular malformation tumor as etiology to bleed blood pressure has been stable and bleed has not progressed. She remains on prophylactic Keppra there is been no concern for seizures. As was discussed yesterday patient is reasonable to discharge from the ICU, neurology will be signing off at this point care is largely regarding disposition. Would also recommend considering psychiatric consultation to help with behavioral management. Will be available as needed. Neurology Progress Note  Zarina London NP    Admit Date: 5/1/2022   LOS: 3 days      Daily Progress Note: 5/4/2022    C/C: left-sided hemiparesis    HPI: Khari Mims is a Kazakh-speaking 78 y.o. F with a pmh of Schizophrenia who presented to [facility] EMERGENCY DEPT on the evening of 5/1 reporting left arm weakness, vomiting, difficulty speaking and inability to ambulate since 4/30/22. Per daughter, she attributed patient's symptoms to patient's mental health issues given her history of \"psych\" problems. [facility] upon arrival showed  an acute right posterial parietal bleed of approx 57.5 ml with surrounding edema and 2 mm midline shift. Her BP was 158/120 on arrival. She was emergently transferred to [facility] for higher level of care. Neurosurgery was consulted and there is no role for surgical intervention.  Neurology was consulted for hemorrhagic stroke management and work-up.     History obtained from the patient's daughter using a [place] . The patient lives with her daughter who states patient has lots of behavioral issues secondary to her schizophrenia. Patient does not currently take any medications other than naproxen. Has no prior history of stroke. There were no reports of fall or trauma.      Pt was initially to be admitted to the hospitalist service but her BP went as high at 199/101 in the ED and she had to be started on an nicardipine gtt. Additionally, pt was provided 0.5 mg Ativan for agitation and then placed on a precedex gtt in the ED as well as right wrist restraints, necessitating a change in admission to ICU status. Subjective:   No acute events noted overnight. The patient has being agitated and screaming in  that she is pain. C/O headache and lower back pain requesting to be reposition. Examiner repositioned patient. Patient noted to have UTI and currently on antibiotic. Patient received tylenol/reglan for headache and on lidocaine patch. Patient was started back on Precedex for agitation per intensivist. Planned to start Seroquel once dobhoff confirmed placement. No Known Allergies    Past Medical History:   Diagnosis Date    Psychotic disorder     concern for schizoaffective disorder     Family History   Problem Relation Age of Onset    Diabetes Neg Hx     Heart Disease Neg Hx     Psychiatric Disorder Neg Hx      Social History     Tobacco Use    Smoking status: Never Smoker    Smokeless tobacco: Not on file   Substance Use Topics    Alcohol use: No      None       Objective:   Vital signs  Temp (24hrs), Av.7 °F (36.5 °C), Min:96.7 °F (35.9 °C), Max:98.8 °F (37.1 °C)   No intake/output data recorded.   701 -  1900  In: 3200.8 [I.V.:3200.8]  Out: 750 [Urine:750]  Visit Vitals  BP (!) 110/50   Pulse (!) 58   Temp 98.8 °F (37.1 °C)   Resp 19   Ht 4' 7\" (1.397 m)   Wt 44.5 kg (98 lb 1.7 oz)   SpO2 97%   BMI 22.80 kg/m²      O2 Device: None (Room air)   Vitals:    05/03/22 2100 05/03/22 2200 05/03/22 2300 05/04/22 0000   BP: (!) 158/65 136/62 (!) 138/50 (!) 110/50   Pulse: (!) 112 85 97 (!) 58   Resp: 19 18 24 19   Temp:       SpO2: 99% 100% 98% 97%   Weight:       Height:          Meds:     Current Facility-Administered Medications   Medication Dose Route Frequency    QUEtiapine (SEROquel) tablet 50 mg  50 mg Oral BID    cefTRIAXone (ROCEPHIN) 1 g in 0.9% sodium chloride 10 mL IV syringe  1 g IntraVENous Q24H    losartan (COZAAR) tablet 50 mg  50 mg Oral DAILY    lidocaine 4 % patch 1 Patch  1 Patch TransDERmal Q24H    metoclopramide HCl (REGLAN) injection 5 mg  5 mg IntraVENous Q6H PRN    lactated Ringers infusion  75 mL/hr IntraVENous CONTINUOUS    levETIRAcetam (KEPPRA) injection 500 mg  500 mg IntraVENous Q12H    labetaloL (NORMODYNE;TRANDATE) injection 10 mg  10 mg IntraVENous Q2H PRN    sodium chloride (NS) flush 5-40 mL  5-40 mL IntraVENous Q8H    sodium chloride (NS) flush 5-40 mL  5-40 mL IntraVENous PRN    acetaminophen (TYLENOL) tablet 650 mg  650 mg Oral Q6H PRN    Or    acetaminophen (TYLENOL) suppository 650 mg  650 mg Rectal Q6H PRN    polyethylene glycol (MIRALAX) packet 17 g  17 g Oral DAILY PRN    ondansetron (ZOFRAN ODT) tablet 4 mg  4 mg Oral Q8H PRN    Or    ondansetron (ZOFRAN) injection 4 mg  4 mg IntraVENous Q6H PRN    dexmedeTOMidine in 0.9 % NaCl (PRECEDEX) 400 mcg/100 mL (4 mcg/mL) infusion soln  0.1-1.5 mcg/kg/hr IntraVENous TITRATE     I personally reviewed all of the medications    Review of Systems:   The patient is too altered and cognitively or communication impaired to participate in ROS. Physical Exam:   Blood pressure (!) 110/50, pulse (!) 58, temperature 98.8 °F (37.1 °C), resp. rate 19, height 4' 7\" (1.397 m), weight 44.5 kg (98 lb 1.7 oz), SpO2 97 %. GEN: NAD, cooperative, calm  HEENT: Normocephalic.  Non-icter, no congestion; dobhoff inplace   Lungs:  CTA bilaterally   Cardiac: S1,S2, normal rate and rhythm, no carotid bruits, no gallops  Abdomen: hypoactive bowel sounds, no distention, non-tender  Extremities: no clubbing, cyanosis, or edema  Skin: no rashes or lesions noted      NEURO: Not intubated but sedated with Precedex   Mental status: appears agitated and screaming. unble to follow commands   Cranial Nerves: unable to assess pupils; resist eye opening during exams. + visual threat. Nonsensical speech at times and will scream in  she is in pain and need reposition which sounds clear. Noted mild facial asymmetry. Motor:  Normal bulk and tone, withdraws x4 ext to pain and spontaneous movement but minimal on L sided. No involuntary movements. Reflexes: +2 throughout, up going L toes and down going R toes  Sensation: Intact x 4 extremities to pain  Gait:  Deferred     Labs/images:     Lab Results   Component Value Date/Time    WBC 11.6 (H) 05/02/2022 05:22 AM    Hemoglobin (POC) 11.6 10/30/2014 08:56 AM    HGB 10.0 (L) 05/02/2022 05:22 AM    HCT 31.2 (L) 05/02/2022 05:22 AM    PLATELET 561 60/55/4835 05:22 AM    MCV 92.9 05/02/2022 05:22 AM      Lab Results   Component Value Date/Time    Sodium 136 05/02/2022 05:22 AM    Potassium 4.7 05/02/2022 05:22 AM    Chloride 106 05/02/2022 05:22 AM    CO2 24 05/02/2022 05:22 AM    Anion gap 6 05/02/2022 05:22 AM    Glucose 135 (H) 05/02/2022 05:22 AM    BUN 8 05/02/2022 05:22 AM    Creatinine 0.73 05/02/2022 05:22 AM    BUN/Creatinine ratio 11 (L) 05/02/2022 05:22 AM    GFR est AA >60 05/02/2022 05:22 AM    GFR est non-AA >60 05/02/2022 05:22 AM    Calcium 8.1 (L) 05/02/2022 05:22 AM    Bilirubin, total 0.7 01/15/2015 11:40 AM    Alk.  phosphatase 87 01/15/2015 11:40 AM    Protein, total 8.1 01/15/2015 11:40 AM    Albumin 4.6 01/15/2015 11:40 AM    Globulin 3.5 01/15/2015 11:40 AM    A-G Ratio 1.3 01/15/2015 11:40 AM    ALT (SGPT) 17 01/15/2015 11:40 AM    AST (SGOT) 14 (L) 01/15/2015 11:40 AM       CT Results (most recent):  Results from Hospital Encounter encounter on 05/01/22    CTA HEAD NECK W CONT    Narrative  *PRELIMINARY REPORT*    No large focal occlusion or aneurysm. No significant change in large right  parieto-occipital parenchymal hemorrhage with edema and localized mass effect or  in small parafalcine subdural hemorrhage. Preliminary report was provided by Dr. Marciano Vora, the on-call radiologist, at  01:05    Final report to follow. *END PRELIMINARY REPORT*    FINAL REPORT:    CLINICAL HISTORY: Right intracranial hemorrhage    EXAMINATION:  CT ANGIOGRAPHY HEAD AND NECK    COMPARISON: 5/1/2022    TECHNIQUE:  Following the uneventful administration of iodinated contrast  material, axial CT angiography of the head and neck was performed. Delayed axial  images through the head were also obtained. Coronal and sagittal reconstructions  were obtained. Manual postprocessing of images was performed. 3-D  Sagittal  maximal intensity projection images were obtained. 3-D Coronal maximal  intensity projections were obtained. CT dose reduction was achieved through use  of a standardized protocol tailored for this examination and automatic exposure  control for dose modulation. FINDINGS:    Delayed contrast-enhanced head CT:    No significant change in moderate right superior parieto-occipital  intraparenchymal hemorrhage with edema and mass effect and minimal subfalcine  herniation to the left. Stable size and appearance of the ventricles. No  evidence for new intracranial hemorrhage. The basal cisterns are patent. Mild  mucosal thickening in the right maxillary sinus. CTA NECK:    Great vessels: Patent origins    Right subclavian artery: Patent    Left subclavian artery: Patent    Right common carotid artery: Patent    Left common carotid artery: Patent    Cervical right internal carotid artery: Patent with no significant stenosis by  NASCET criteria.     Cervical left internal carotid artery: Patent with no significant stenosis by  NASCET criteria. Right vertebral artery: Patent    Left vertebral artery: Patent    CTA HEAD:    Right cavernous internal carotid artery: Patent    Left cavernous internal carotid artery: Patent    Anterior cerebral arteries: Patent    Anterior communicating artery: Patent    Right middle cerebral artery: Patent    Left middle cerebral artery: Patent    Posterior communicating arteries: Left is patent. Right is hypoplastic    Posterior cerebral arteries: Patent    Basilar artery: Patent    Distal vertebral arteries: Patent    No evidence for intracranial aneurysm    Measurements use NASCET criteria. Impression  No significant change in right superior parieto-occipital acute parenchymal  hemorrhage with edema and mass effect. No evidence for intracranial aneurysm. MRI Results (most recent):  Results from [place] encounter on 05/01/22    MRI BRAIN W WO CONT    Narrative  *PRELIMINARY REPORT*    Large right frontoparietal hemorrhage redemonstrated with surrounding edema. No  significant interval change or new acute findings. Preliminary report was provided by Dr. Adams, the on-call radiologist, at  23:44    Final report to follow. *END PRELIMINARY REPORT*     Assessment:     Active Problems: ICH score: 2    Hemorrhagic stroke ([address] (5/1/2022)    Plan:     Preliminary MRI Brain showed Large right frontoparietal hemorrhage redemonstrated with surrounding edema. No significant interval change or new acute findings.     CTA showed no focal occlusion or aneurysm    · Suspect presumed etiology to be likely due to hypertensive related  · Prn Cardene gtt/labetolol/hydralazine to maintain blood pressure less than 140/90  · Please avoid antiplatelet or any AC at this time  · rEEG completed; no epileptiform discharges or electrographic seizure noted  · Continue with Keppra 500 mg BID x7 days (3 days today)  · DVT ppx with SCD's  · Palliative and Supportive care  · Stroke education   · PT/OT/ST when able  · NSGY consulted; no intervention at this time  · q2 neuro checks  · Echo with EF of 55 - 60%; No shunt seen  · Attempt to wean Precedex in order to downgrade patient  · Start Seroquel when able; in the interim will give Geodon once     Chart reviewed    Further neurology recommendations to follow by Dr. Neeta Buck     Case discussed with: intensivist, patient's daughter at bedside, and primary ICU nurse     >35% time spent in counseling or coordination of care of the above in the assessment and plan     Signed By: Nahum Magdaleno NP                    May 4, 2022    Please note that this dictation was completed with [employer], the computer voice recognition software. Quite often unanticipated grammatical, syntax, homophones, and other interpretive errors are inadvertently transcribed by the computer software. Please disregard these errors. Please excuse any errors that have escaped final proofreading.

## 2022-05-04 NOTE — PROGRESS NOTES
Physical Therapy:     Chart reviewed and discussed with RN in prep for PT eval.  Pt continues to present with no command following, has intermittent agitation, and is not able to actively participate in skilled therapy services s/p admission with large right frontoparietal hemorrhage. Pt has not been appropriate for 3 consecutive days, will sign off. Please re-consult PT services if patient is following commands and becomes appropriate for participation with therapy.     oMshe Boyd, PT, DPT

## 2022-05-04 NOTE — PROGRESS NOTES
[facility]    ICU TEAM Progress Note    Name: Marlyn Lock   : 1955   MRN: [id number]   Date: 2022        URMILA Cuevas is a 18-year-old female with PMHx of schizophrenia who presents to the ED as a transfer from an OSH ED for right-sided mixed density right parietal parenchymal hemorrhage. Per chart review, her symptoms began yesterday morning with left-sided hemiparesis. Family brought her to the OSH today after she became more lethargic with AMS. Neurosurgery evaluated patient and no need for neurosurgical intervention at this time. She was started on Cardene in the ED for hypertension and Precedex for agitation. OSH was unable to perform CTA due to agitation. Will attempt CTA while on Precedex. She will be admitted to the ICU for further medical management. Reason for ICU Admission: [address]    Subjective:   Overnight Events:   [address] [facility] [address] Course  : Admitted to ICU on Cardene and Precedex infusions.  : on precedex drip, sedated, does not follow any commands  5/3 : remains on precedex. MRI of brain last night. : Continued agitation overnight    POD:   IMPRESSION  1. Stable large right superior frontoparietal acute intraparenchymal hemorrhage  and mild surrounding edema, with mass effect resulting in partial effacement of  the right lateral ventricle and 4 mm of leftward midline shift. No evidence of  underlying mass. 2. Stable trace acute subdural hemorrhage along the falx, and stable trace  intraventricular hemorrhage. The ventricles remain normal in size.            ICU Assessment and Plan:     ICH  HTN  Acute encephalopathy  Schizophrenia         ICU Comprehensive Plan of Care:     1. Neurological System    Spontaneous Awakening Trial: N/A  Sedation: None  Analgesia: Acetaminophen   Zyprexa and seroquel      2.  Cardiovascular System    SBP Goal of: > 90 mmHg and < 160 mmHg  MAP Goal of: > 65 mmHg  Transfusion Trigger (Hgb): <7 g/dL  Keep K > 4; Mg > 2 3. Respiratory System  Incentive spirometry  Spontaneous Breathing Trial: N/A  Pulmonary toilet: Incentive Spirometry   SpO2 Goal: > 92%  DVT Prophylaxis: SCD's or Sequential Compression Device     4. Renal/GI/Endocrine System  IVFs:   Ulcer Prophylaxis: Not at this time   Bowel Regimen: MiraLax  Feeding:  No NPO  Blood Sugar Goal 120-180 - Glycemic Control: Insulin    5. Infectious Disease : no acute issues at this time    Indwelling Catheter:  Tubes: None  Lines: Peripheral IV  Drains: None  D - De-escalation of Antibiotics:   None    6. PT/OT: PT consulted and on board, OT consulted and on board and Speech therapy consulted and on board     7. Goals of Care Discussion with family Pending   8. Palliative care consult    9. Plan of Care/Code Status: DNI    10. Discussed Care Plan with Bedside RN    11. Documentation of Current Medications      Active Problem List:     Problem List  Date Reviewed: 6/9/2015          Codes Class    Hemorrhagic stroke [facility]) ICD-10-CM: I61.9  ICD-9-CM: 065         Schizoaffective disorder ([facility] ICD-10-CM: F25.9  ICD-9-CM: 295.70               Past Medical History:      has a past medical history of Psychotic disorder. Past Surgical History:      has a past surgical history that includes hx tubal ligation.     Home Medications:     Prior to Admission medications    Not on File       Allergies/Social/Family History:     No Known Allergies   Social History     Tobacco Use    Smoking status: Never Smoker    Smokeless tobacco: Not on file   Substance Use Topics    Alcohol use: No      Family History   Problem Relation Age of Onset    Diabetes Neg Hx     Heart Disease Neg Hx     Psychiatric Disorder Neg Hx        Review of Systems:     Review of Systems   Unable to perform ROS: Acuity of condition       Objective:   Vital Signs:  Visit Vitals  BP (!) 151/78 (BP 1 Location: Left upper arm, BP Patient Position: At rest)   Pulse 69   Temp 99.2 °F (37.3 °C)   Resp 15   Ht 4' 7\" (1.397 m)   Wt 43.5 kg (95 lb 14.4 oz)   SpO2 98%   BMI 22.29 kg/m²      O2 Device: None (Room air) Temp (24hrs), Av °F (37.2 °C), Min:98.8 °F (37.1 °C), Max:99.2 °F (37.3 °C)           Intake/Output:     Intake/Output Summary (Last 24 hours) at 2022 1635  Last data filed at 2022 1200  Gross per 24 hour   Intake 1600.44 ml   Output 2200 ml   Net -599.56 ml       Physical Exam:  HEENT: normal  Heart: s1,s2  Lungs: clear  Abd: soft  Ext: no edema  Cns: cannot assess, deeply sedated on precedex as earlier she was very agitated      LABS AND  DATA: Personally reviewed  Recent Labs     22   WBC 10.4 11.6*   HGB 10.3* 10.0*   HCT 31.4* 31.2*    217     Recent Labs     22    136   K 3.1* 4.7   * 106   CO2 17* 24   BUN 9 8   CREA 0.57 0.73   GLU 82 135*   CA 8.2* 8.1*     No results for input(s): AP, TBIL, TP, ALB, GLOB, AML, LPSE in the last 72 hours. No lab exists for component: SGOT, GPT, AMYP  Recent Labs     228   INR 1.0   PTP 10.9   APTT 26.0      No results for input(s): PHI, PCO2I, PO2I, FIO2I in the last 72 hours. No results for input(s): CPK, CKMB, TROIQ, BNPP in the last 72 hours. Imagin2022       Chest X-Ray:  CXR Results  (Last 48 hours)    None            ECHO:  pending    MEDS: Reviewed    Multidisciplinary Rounds Completed:  Pending   ABCDEF Bundle/Checklist Completed:  Yes    SPECIAL EQUIPMENT  None    DISPOSITION  Transfer to non-ICU bed    CRITICAL CARE CONSULTANT NOTE  I had a face to face encounter with the patient, reviewed and interpreted patient data including clinical events, labs, images, vital signs, I/O's, and examined patient. I have discussed the case and the plan and management of the patient's care with the consulting services, the bedside nurses and the respiratory therapist.      Pt is stable for transfer.     Aury Velasquez MD, PhD  [address]  2022

## 2022-05-04 NOTE — PROGRESS NOTES
Transition of Care Plan   RUR- Low     DISPOSITION:  pending medical progression   F/U with PCP/Specialist     Transport: [facility]/family   Patient remains in the ICU. Per therapy patient not following commands, remains agitated. Therapy on hold for now. Care management continuing to follow.   Josh Weathers RN,Care Mangement

## 2022-05-04 NOTE — PROGRESS NOTES
Spiritual Care Assessment/Progress Note  [address]      NAME: Luiza Trujillo      MRN: [id number]  AGE: 78 y.o. SEX: female  Mosque Affiliation: No Sabianist   Language: Greenlandic     5/4/2022     Total Time (in minutes): 5     Spiritual Assessment begun in . [address] through conversation with:         []Patient        [] Family    [] Friend(s)        Reason for Consult: Palliative Care, Initial/Spiritual Assessment     Spiritual beliefs: (Please include comment if needed)     [] Identifies with a renetta tradition:         [] Supported by a renetta community:            [] Claims no spiritual orientation:           [] Seeking spiritual identity:                [] Adheres to an individual form of spirituality:           [x] Not able to assess:                           Identified resources for coping:      [] Prayer                               [] Music                  [] Guided Imagery     [] Family/friends                 [] Pet visits     [] Devotional reading                         [x] Unknown     [] Other:                                               Interventions offered during this visit: (See comments for more details)                Plan of Care:     [] Support spiritual and/or cultural needs    [] Support AMD and/or advance care planning process      [] Support grieving process   [] Coordinate Rites and/or Rituals    [] Coordination with community clergy   [] No spiritual needs identified at this time   [] Detailed Plan of Care below (See Comments)  [] Make referral to Music Therapy  [] Make referral to Pet Therapy     [] Make referral to Addiction services  [] Make referral to [facility]  [] Make referral to Spiritual Care Partner  [] No future visits requested        [x] Contact Spiritual Care for further referrals     Comments: Attempted Initial Spiritual Assessment for this pt in [place] [address]. Reviewed pt's chart prior to this visit. Pt was unable to be assessed at this time.   No family/friends present at time of visit. Contact Spiritual Care Services for any spiritual or emotional support needs. Ar Diaz MDiv.  Staff   Request  Support/Spiritual Care Services on [phone])

## 2022-05-04 NOTE — PROGRESS NOTES
Provided remote interpreting to Pt and Dr. Addison Enriquez. Pt was not able to answer questions.     Junior Rubio  [phone]

## 2022-05-04 NOTE — PROGRESS NOTES
TRANSFER - OUT REPORT:    Verbal report given to Sree Sloan RN(name) on [facility]  being transferred to NSTU(unit) for routine progression of care       Report consisted of patients Situation, Background, Assessment and   Recommendations(SBAR). Information from the following report(s) SBAR, Kardex, Procedure Summary, Intake/Output, MAR, Recent Results, Med Rec Status, Cardiac Rhythm NSR/Sinus tach, Alarm Parameters  and Dual Neuro Assessment was reviewed with the receiving nurse. Lines:   Peripheral IV 05/01/22 Right Antecubital (Active)   Site Assessment Intact 05/04/22 1200   Phlebitis Assessment 0 05/04/22 0800   Infiltration Assessment 0 05/04/22 0800   Dressing Status Old drainage; Intact 05/04/22 0800   Dressing Type Transparent 05/04/22 0800   Hub Color/Line Status Pink;Capped 05/04/22 0800   Action Taken Open ports on tubing capped 05/04/22 0800   Alcohol Cap Used Yes 05/04/22 0800       Peripheral IV 05/01/22 Left Antecubital (Active)   Site Assessment Clean, dry, & intact 05/04/22 1200   Phlebitis Assessment 0 05/04/22 0800   Infiltration Assessment 0 05/04/22 0800   Dressing Status Clean, dry, & intact 05/04/22 0800   Dressing Type Transparent 05/04/22 0800   Hub Color/Line Status Pink;Capped 05/04/22 0800   Action Taken Open ports on tubing capped 05/04/22 0800   Alcohol Cap Used Yes 05/04/22 0800        Opportunity for questions and clarification was provided.       Patient transported with:   Monitor  Registered Nurse  Tech

## 2022-05-04 NOTE — PROGRESS NOTES
Bedside shift change report given to Flip Pichardo (oncoming nurse) by Yani Simmons (offgoing nurse). Report included the following information SBAR, MAR and Recent Results.

## 2022-05-04 NOTE — PROGRESS NOTES
Occupational therapy  05/04/22     Chart reviewed and discussed with RN in prep for OT eval.  Pt continues to present with no command following, has intermittent agitation, and is not able to actively participate in skilled therapy services s/p admission with large right frontoparietal hemorrhage. Pt has not been appropriate for 3 consecutive days, will sign off. Please re-consult OT services if patient is following commands and becomes appropriate for participation with therapy.     Thank you,  Shayne Solis, OTR/L

## 2022-05-05 ENCOUNTER — APPOINTMENT (OUTPATIENT)
Dept: GENERAL RADIOLOGY | Age: 67
DRG: 064 | End: 2022-05-05
Attending: HOSPITALIST

## 2022-05-05 LAB
ANION GAP SERPL CALC-SCNC: 16 MMOL/L (ref 5–15)
BASOPHILS # BLD: 0.1 K/UL (ref 0–0.1)
BASOPHILS NFR BLD: 1 % (ref 0–1)
BUN SERPL-MCNC: 6 MG/DL (ref 6–20)
BUN/CREAT SERPL: 12 (ref 12–20)
CALCIUM SERPL-MCNC: 8.9 MG/DL (ref 8.5–10.1)
CHLORIDE SERPL-SCNC: 103 MMOL/L (ref 97–108)
CO2 SERPL-SCNC: 16 MMOL/L (ref 21–32)
CREAT SERPL-MCNC: 0.52 MG/DL (ref 0.55–1.02)
DIFFERENTIAL METHOD BLD: ABNORMAL
EOSINOPHIL # BLD: 0 K/UL (ref 0–0.4)
EOSINOPHIL NFR BLD: 0 % (ref 0–7)
ERYTHROCYTE [DISTWIDTH] IN BLOOD BY AUTOMATED COUNT: 12.7 % (ref 11.5–14.5)
GLUCOSE SERPL-MCNC: 108 MG/DL (ref 65–100)
HCT VFR BLD AUTO: 34.4 % (ref 35–47)
HGB BLD-MCNC: 11.8 G/DL (ref 11.5–16)
IMM GRANULOCYTES # BLD AUTO: 0.1 K/UL (ref 0–0.04)
IMM GRANULOCYTES NFR BLD AUTO: 1 % (ref 0–0.5)
LACTATE SERPL-SCNC: 1.9 MMOL/L (ref 0.4–2)
LYMPHOCYTES # BLD: 1.5 K/UL (ref 0.8–3.5)
LYMPHOCYTES NFR BLD: 11 % (ref 12–49)
MCH RBC QN AUTO: 30.1 PG (ref 26–34)
MCHC RBC AUTO-ENTMCNC: 34.3 G/DL (ref 30–36.5)
MCV RBC AUTO: 87.8 FL (ref 80–99)
MONOCYTES # BLD: 1.1 K/UL (ref 0–1)
MONOCYTES NFR BLD: 9 % (ref 5–13)
NEUTS SEG # BLD: 10.4 K/UL (ref 1.8–8)
NEUTS SEG NFR BLD: 78 % (ref 32–75)
NRBC # BLD: 0 K/UL (ref 0–0.01)
NRBC BLD-RTO: 0 PER 100 WBC
PLATELET # BLD AUTO: 287 K/UL (ref 150–400)
PMV BLD AUTO: 10.9 FL (ref 8.9–12.9)
POTASSIUM SERPL-SCNC: 2.8 MMOL/L (ref 3.5–5.1)
RBC # BLD AUTO: 3.92 M/UL (ref 3.8–5.2)
SODIUM SERPL-SCNC: 135 MMOL/L (ref 136–145)
WBC # BLD AUTO: 13.2 K/UL (ref 3.6–11)

## 2022-05-05 PROCEDURE — 74011250636 HC RX REV CODE- 250/636: Performed by: HOSPITALIST

## 2022-05-05 PROCEDURE — 74011250636 HC RX REV CODE- 250/636: Performed by: PSYCHIATRY & NEUROLOGY

## 2022-05-05 PROCEDURE — 74011000250 HC RX REV CODE- 250: Performed by: INTERNAL MEDICINE

## 2022-05-05 PROCEDURE — 36415 COLL VENOUS BLD VENIPUNCTURE: CPT

## 2022-05-05 PROCEDURE — 65270000046 HC RM TELEMETRY

## 2022-05-05 PROCEDURE — 85025 COMPLETE CBC W/AUTO DIFF WBC: CPT

## 2022-05-05 PROCEDURE — 71045 X-RAY EXAM CHEST 1 VIEW: CPT

## 2022-05-05 PROCEDURE — 83605 ASSAY OF LACTIC ACID: CPT

## 2022-05-05 PROCEDURE — 74011000250 HC RX REV CODE- 250: Performed by: HOSPITALIST

## 2022-05-05 PROCEDURE — 74011000250 HC RX REV CODE- 250: Performed by: NURSE PRACTITIONER

## 2022-05-05 PROCEDURE — 74011250636 HC RX REV CODE- 250/636: Performed by: INTERNAL MEDICINE

## 2022-05-05 PROCEDURE — 80048 BASIC METABOLIC PNL TOTAL CA: CPT

## 2022-05-05 RX ORDER — DEXTROSE MONOHYDRATE 50 MG/ML
50 INJECTION, SOLUTION INTRAVENOUS CONTINUOUS
Status: DISCONTINUED | OUTPATIENT
Start: 2022-05-05 | End: 2022-05-05

## 2022-05-05 RX ORDER — POTASSIUM CHLORIDE 7.45 MG/ML
10 INJECTION INTRAVENOUS
Status: COMPLETED | OUTPATIENT
Start: 2022-05-05 | End: 2022-05-06

## 2022-05-05 RX ORDER — METOPROLOL TARTRATE 5 MG/5ML
2.5 INJECTION INTRAVENOUS EVERY 6 HOURS
Status: DISCONTINUED | OUTPATIENT
Start: 2022-05-05 | End: 2022-05-12

## 2022-05-05 RX ORDER — HYDRALAZINE HYDROCHLORIDE 20 MG/ML
10 INJECTION INTRAMUSCULAR; INTRAVENOUS
Status: DISCONTINUED | OUTPATIENT
Start: 2022-05-05 | End: 2022-05-10

## 2022-05-05 RX ADMIN — METOPROLOL TARTRATE 2.5 MG: 1 INJECTION, SOLUTION INTRAVENOUS at 18:00

## 2022-05-05 RX ADMIN — POTASSIUM CHLORIDE 10 MEQ: 7.46 INJECTION, SOLUTION INTRAVENOUS at 13:05

## 2022-05-05 RX ADMIN — SODIUM CHLORIDE, PRESERVATIVE FREE 10 ML: 5 INJECTION INTRAVENOUS at 23:50

## 2022-05-05 RX ADMIN — DEXTROSE MONOHYDRATE 50 ML/HR: 50 INJECTION, SOLUTION INTRAVENOUS at 01:54

## 2022-05-05 RX ADMIN — LEVETIRACETAM 500 MG: 100 INJECTION, SOLUTION, CONCENTRATE INTRAVENOUS at 02:16

## 2022-05-05 RX ADMIN — METOPROLOL TARTRATE 2.5 MG: 1 INJECTION, SOLUTION INTRAVENOUS at 12:16

## 2022-05-05 RX ADMIN — CEFTRIAXONE SODIUM 1 G: 1 INJECTION, POWDER, FOR SOLUTION INTRAMUSCULAR; INTRAVENOUS at 12:13

## 2022-05-05 RX ADMIN — HYDRALAZINE HYDROCHLORIDE 10 MG: 20 INJECTION, SOLUTION INTRAMUSCULAR; INTRAVENOUS at 09:26

## 2022-05-05 RX ADMIN — SODIUM CHLORIDE, PRESERVATIVE FREE 10 ML: 5 INJECTION INTRAVENOUS at 14:20

## 2022-05-05 RX ADMIN — POTASSIUM CHLORIDE 10 MEQ: 7.46 INJECTION, SOLUTION INTRAVENOUS at 12:09

## 2022-05-05 RX ADMIN — POTASSIUM CHLORIDE 10 MEQ: 7.46 INJECTION, SOLUTION INTRAVENOUS at 15:34

## 2022-05-05 RX ADMIN — LEVETIRACETAM 500 MG: 100 INJECTION, SOLUTION, CONCENTRATE INTRAVENOUS at 14:13

## 2022-05-05 RX ADMIN — POTASSIUM CHLORIDE 10 MEQ: 7.46 INJECTION, SOLUTION INTRAVENOUS at 14:10

## 2022-05-05 RX ADMIN — POTASSIUM CHLORIDE 10 MEQ: 7.46 INJECTION, SOLUTION INTRAVENOUS at 17:29

## 2022-05-05 RX ADMIN — METOPROLOL TARTRATE 2.5 MG: 1 INJECTION, SOLUTION INTRAVENOUS at 23:45

## 2022-05-05 RX ADMIN — SODIUM BICARBONATE: 84 INJECTION, SOLUTION INTRAVENOUS at 12:58

## 2022-05-05 NOTE — PROGRESS NOTES
PALLIATIVE MEDICINE          Unable to reach family again. We will follow-up tomorrow. Gaby Vance.  [address] MSN, FNP-BC, [facility]

## 2022-05-05 NOTE — PROGRESS NOTES
[address] [facility] Adult  Hospitalist Group     ICU Transfer/Accept Summary     This patient is being [place] [address] ICU  DATE OF TRANSFER: 5/5/2022       PATIENT ID: Kimberly Gross  MRN: [id number]   YOB: 1955    PRIMARY CARE PROVIDER: None   DATE OF ADMISSION: 5/1/2022  7:04 PM    ATTENDING PHYSICIAN: Vero Emmanuel MD  CONSULTATIONS:   IP CONSULT TO HOSPITALIST  IP CONSULT TO PALLIATIVE CARE - PROVIDER  IP CONSULT TO NEUROLOGY    PROCEDURES/SURGERIES:   * No surgery found *    REASON FOR ADMISSION: <principal problem not specified>     HOSPITAL PROBLEM LIST:  Patient Active Problem List   Diagnosis Code    Schizoaffective disorder ([facility] [address] F25.9    Hemorrhagic stroke ([facility] [address] I61.9         Brief HPI and Hospital Course:    Per H and P \"78year-old female with PMHx of schizophrenia who presents to the ED as a transfer from an OSH ED for right-sided mixed density right parietal parenchymal hemorrhage. Per chart review, her symptoms began yesterday morning with left-sided hemiparesis. Family brought her to the OSH today after she became more lethargic with AMS. Neurosurgery evaluated patient and no need for neurosurgical intervention at this time. She was started on Cardene in the ED for hypertension and Precedex for agitation. OSH was unable to perform CTA due to agitation. Will attempt CTA while on Precedex. She will be admitted to the ICU for further medical management\"    Transferred out of ICU 5/4. Assessment and Plan:  # Right parieto-occipital and right falcine hemorrhages  -. Right parieto-occipital and right falcine hemorrhages with associated edema  and mass effect and leftward midline shift are not significantly changed from  prior. There is new the evident layering intraventricular hemorrhage in the  posterior left lateral ventricular horn. Conservative management per NSGY  -neurology following  -she is confused and intermitent [id number]    #Dysphagia:   In restraints, pulled NG tube ,replace it    #HTN:  Goal less than 140  -on losartan  Prn labetalol  Use cardene if uncontrolled        ? ?? schizophrenia per chart review           PHYSICAL EXAMINATION:  Visit Vitals  /80   Pulse (!) 104   Temp 99.4 °F (37.4 °C)   Resp 22   Ht 4' 7\" (1.397 m)   Wt 43.5 kg (95 lb 14.4 oz)   SpO2 97%   BMI 22.29 kg/m²       General:          elderly patient  HEENT:           anicteric sclera,EOMI       Lungs:             Clear to auscultation bilaterally. No Wheezing or Rhonchi. No rales. Heart:              Regular  rhythm, S1, s2 wnl  Abdomen:       Soft, non-tender. Not distended. Bowel sounds normal  Extremities:     No LE edema  Skin:                Not pale. Not Jaundiced  No rashes   Psych:             Not anxious or agitated. Neurologic:      Awake, left sided weakness, does not follow commands,confused    Labs:     Recent Labs     05/04/22 0330 05/02/22 0522   WBC 10.4 11.6*   HGB 10.3* 10.0*   HCT 31.4* 31.2*    217     Recent Labs     05/04/22  0330 05/02/22 0522    136   K 3.1* 4.7   * 106   CO2 17* 24   BUN 9 8   CREA 0.57 0.73   GLU 82 135*   CA 8.2* 8.1*     No results for input(s): ALT, AP, TBIL, TBILI, TP, ALB, GLOB, GGT, AML, LPSE in the last 72 hours. No lab exists for component: SGOT, GPT, AMYP, HLPSE  No results for input(s): INR, PTP, APTT, INREXT in the last 72 hours. No results for input(s): FE, TIBC, PSAT, FERR in the last 72 hours. No results found for: FOL, RBCF   No results for input(s): PH, PCO2, PO2 in the last 72 hours. No results for input(s): CPK, CKNDX, TROIQ in the last 72 hours.     No lab exists for component: CPKMB  Lab Results   Component Value Date/Time    Cholesterol, total 161 05/02/2022 05:22 AM    HDL Cholesterol 69 05/02/2022 05:22 AM    LDL, calculated 77.6 05/02/2022 05:22 AM    Triglyceride 72 05/02/2022 05:22 AM    CHOL/HDL Ratio 2.3 05/02/2022 05:22 AM     Lab Results   Component Value Date/Time Glucose  fasting 10/30/2014 09:12 AM    Glucose POC 04 fasting 10/30/2014 08:56 AM     Lab Results   Component Value Date/Time    Color YELLOW/STRAW 05/02/2022 05:22 AM    Appearance CLEAR 05/02/2022 05:22 AM    Specific gravity 1.010 05/02/2022 05:22 AM    pH (UA) 6.0 05/02/2022 05:22 AM    Protein Negative 05/02/2022 05:22 AM    Glucose Negative 05/02/2022 05:22 AM    Ketone TRACE (A) 05/02/2022 05:22 AM    Bilirubin Negative 05/02/2022 05:22 AM    Urobilinogen 1.0 05/02/2022 05:22 AM    Nitrites Positive (A) 05/02/2022 05:22 AM    Leukocyte Esterase SMALL (A) 05/02/2022 05:22 AM    Epithelial cells FEW 05/02/2022 05:22 AM    Bacteria 4+ (A) 05/02/2022 05:22 AM    WBC 10-20 05/02/2022 05:22 AM    RBC 0-5 05/02/2022 05:22 AM         CODE STATUS:   Full Code    DNR   x Partial    Comfort Care       Signed:   Scott Thompson MD  Date of Service:  5/5/2022  12:34 AM

## 2022-05-05 NOTE — CONSULTS
[address]    [address] [address]        GASTROENTEROLOGY CONSULTATION NOTE  Will Belen Starks  [phone] office  [phone] NP/PA in-hospital cell phone M-F until 4:30PM  After 5PM or on weekends, please call  for physician on call        NAME:  Daniel Pierre   :   1955   MRN:   [id number]       Referring Physician: Dr. Felipe Alvarez Date: 2022 1:29 PM     History of Present Illness:  Patient is a 78 y.o. who is seen in consultation at the request of Dr. Brian Marinelli for PEG. Patient has a past medical history significant for schizophrenia. She presented as a transfer from an outside hospital ED for right parietal parenchymal hemorrhage. Patient was admitted to the hospital on 22. Patient is unable to provide any history. Per chart review, Dobhoff was pulled out 4 times. She is in restraints. No anticoagulation. Per documentation, history of tubal ligation; no other abdominal surgeries documented. No record of EGD/colonoscopy. I have reviewed the emergency room note, hospital admission note, notes by all other clinicians who have seen the patient during this hospitalization to date. I have reviewed the problem list and the reason for this hospitalization. I have reviewed the allergies and the medications the patient was taking at home prior to this hospitalization.     PMH:  Past Medical History:   Diagnosis Date    Psychotic disorder     concern for schizoaffective disorder       PSH:  Past Surgical History:   Procedure Laterality Date    HX TUBAL LIGATION         Allergies:  No Known Allergies    Home Medications:  None       Hospital Medications:  Current Facility-Administered Medications   Medication Dose Route Frequency    hydrALAZINE (APRESOLINE) 20 mg/mL injection 10 mg  10 mg IntraVENous Q6H PRN    niCARdipine (CARDENE) 25 mg in 0.9% sodium chloride 250 mL infusion  0-15 mg/hr IntraVENous TITRATE    dextrose 5 % - 0.45% NaCl 1,000 mL with sodium bicarbonate (8.4%) 75 mEq infusion   IntraVENous CONTINUOUS    potassium chloride 10 mEq in 100 ml IVPB  10 mEq IntraVENous Q1H    metoprolol (LOPRESSOR) injection 2.5 mg  2.5 mg IntraVENous Q6H    QUEtiapine (SEROquel) tablet 50 mg  50 mg Oral BID    cefTRIAXone (ROCEPHIN) 1 g in 0.9% sodium chloride 10 mL IV syringe  1 g IntraVENous Q24H    losartan (COZAAR) tablet 50 mg  50 mg Oral DAILY    lidocaine 4 % patch 1 Patch  1 Patch TransDERmal Q24H    metoclopramide HCl (REGLAN) injection 5 mg  5 mg IntraVENous Q6H PRN    levETIRAcetam (KEPPRA) injection 500 mg  500 mg IntraVENous Q12H    labetaloL (NORMODYNE;TRANDATE) injection 10 mg  10 mg IntraVENous Q2H PRN    sodium chloride (NS) flush 5-40 mL  5-40 mL IntraVENous Q8H    sodium chloride (NS) flush 5-40 mL  5-40 mL IntraVENous PRN    acetaminophen (TYLENOL) tablet 650 mg  650 mg Oral Q6H PRN    Or    acetaminophen (TYLENOL) suppository 650 mg  650 mg Rectal Q6H PRN    polyethylene glycol (MIRALAX) packet 17 g  17 g Oral DAILY PRN    ondansetron (ZOFRAN ODT) tablet 4 mg  4 mg Oral Q8H PRN    Or    ondansetron (ZOFRAN) injection 4 mg  4 mg IntraVENous Q6H PRN       Social History:  Social History     Tobacco Use    Smoking status: Never Smoker    Smokeless tobacco: Not on file   Substance Use Topics    Alcohol use: No       Family History:  Family History   Problem Relation Age of Onset    Diabetes Neg Hx     Heart Disease Neg Hx     Psychiatric Disorder Neg Hx      Review of Systems:  Unable to obtain    Objective:     Patient Vitals for the past 8 hrs:   BP Temp Pulse Resp SpO2   05/05/22 1216 131/80  (!) 123     05/05/22 1200   (!) 105     05/05/22 1013 139/79 100.1 °F (37.8 °C) (!) 120 22 98 %   05/05/22 1000   (!) 118     05/05/22 0926 (!) 160/92  (!) 119     05/05/22 0924 (!) 160/92  (!) 117     05/05/22 0619   95     05/05/22 0615 (!) 141/73 98.5 °F (36.9 °C) 98 26 99 %     05/05 0701 - 05/05 1900  In: -   Out: 750 [Urine:750]  05/03 1901 - 05/05 0700  In: 1450.4 [I.V.:1410.4]  Out: 2200 [Urine:2200]    EXAM:     CONST:  No acute distress   NEURO:  Awake, does not follow commands   HEENT: No scleral icterus   LUNGS: No acute respiratory distress   ABD:  Soft, non distended, no apparent tenderness, no rebound, no guarding. + Bowel sounds. EXT:  Warm   PSYCH: Unable to assess     Data Review     Recent Labs     05/05/22 0211 05/04/22  0330   WBC 13.2* 10.4   HGB 11.8 10.3*   HCT 34.4* 31.4*    210     Recent Labs     05/05/22 0211 05/04/22 0330   * 140   K 2.8* 3.1*    111*   CO2 16* 17*   BUN 6 9   CREA 0.52* 0.57   * 82   CA 8.9 8.2*     No results for input(s): AP, TBIL, TP, ALB, GLOB, GGT, AML, LPSE in the last 72 hours. No lab exists for component: SGOT, GPT, AMYP, HLPSE  No results for input(s): INR, PTP, APTT, INREXT in the last 72 hours. Assessment:   · Dysphagia: SLP evaluated, pulled out NG tubes  · Acute hemorrhagic stroke (right superior frontoparietal acute intraparenchymal hemorrhage/acute subdural hemorrhage along falx): conservative management per neurosurgery  · Urinary tract infection: on antibiotics.    · Hypertension     Patient Active Problem List   Diagnosis Code    Schizoaffective disorder ([facility] [address] F25.9    Hemorrhagic stroke ([facility] [address] I61.9     Plan:     · SLP following - recommend NPO  · Pulled out multiple NG tubes per documentation  · We will await input from palliative care  · Patient was discussed with Dr. Yvette Mcdonough  · Thank you for allowing me to participate in care of [place]     Signed By: Waynetta Cheadle, [place]     5/5/2022  1:29 PM     Agree with above  Will follow

## 2022-05-05 NOTE — PROGRESS NOTES
Transition of Care Plan: TBD: pending medical/therapy recommendations  *patient is uninsured*    RUR: 12% low    PCP F/U:     Disposition:  TBD: pending medical/therapy recommendations    Transportation: TBD    Main Contact: Daughter: Wendy Boyle: [phone]   Toyin Delgado [phone]   * needed for both contacts*    [phone]: Discussed in rounds patient may need a PEG tube placed. Therapy also needs to see patient once she is more stable. Patient is uninsured. Escalated case to  to follow in case patient needs placement and lack of insurance is a barrier. CM to reach out to daughter to try to elicit more information. Will continue to follow. [phone]: Telephone call to patient's daughter. Someone else answered the phone. Asked to be speak with Alray Councilman, but is currently at work. Left message for daughter to return call. [phone]: Telephone call to toyin Marko Mata. Interpretor needed, but was able to determine patient's residency. Patient does not have a green card. Would not be able to qualify for luis fernando bed at any [facility] if needed.      Yfn Mendoza, RN, CRM

## 2022-05-05 NOTE — PROGRESS NOTES
5/4/2022 8:43 PM  [phone]  Hospital or Facility: [facility], [facility]  From: Ellie Tobin  RE: Alina Garduno  RM: [phone]    INJVO. Pt is supposed to have a Dobhoff. Pt pulled it out twice in ICU today and she got to Neuro around 1800. Pulled it out again. Nurse put it back. Got placement checked at 31 75 62 and went back in around 1915 and it was out for the 4th time today. The only oral med I am supposed to give her is a Seroquel. Do I need to put it back tonight? She is only getting tube feed at 20ml hr. She has fluids running in her IV.      5/4/2022 8:48 PM  may leave out for now.  make sure SLP eval is reattempted in AM

## 2022-05-05 NOTE — PROGRESS NOTES
Problem: Pressure Injury - Risk of  Goal: *Prevention of pressure injury  Description: Document Eugene Scale and appropriate interventions in the flowsheet. Outcome: Progressing Towards Goal  Note: Pressure Injury Interventions:  Sensory Interventions: Assess changes in LOC,Minimize linen layers    Moisture Interventions: Absorbent underpads,Minimize layers    Activity Interventions: Pressure redistribution bed/mattress(bed type)    Mobility Interventions: Turn and reposition approx.  every two hours(pillow and wedges)    Nutrition Interventions: Document food/fluid/supplement intake    Friction and Shear Interventions: Minimize layers                Problem: Non-Violent Restraints  Goal: Removal from restraints as soon as assessed to be safe  Outcome: Progressing Towards Goal  Goal: No harm/injury to patient while restraints in use  Outcome: Progressing Towards Goal  Goal: Patient's dignity will be maintained  Outcome: Progressing Towards Goal  Goal: Patient Interventions  Outcome: Progressing Towards Goal     Problem: Ischemic Stroke: Discharge Outcomes  Goal: *Verbalizes anxiety and depression are reduced or absent  Outcome: Progressing Towards Goal  Goal: *Absence of aspiration pneumonia  Outcome: Progressing Towards Goal  Goal: *Aware of needed dietary changes  Outcome: Progressing Towards Goal  Goal: *Verbalize understanding of prescribed medications including anti-coagulants, anti-lipid, and/or anti-platelets(Stroke Metric)  Outcome: Progressing Towards Goal  Goal: *Tolerating diet  Outcome: Progressing Towards Goal  Goal: *Aware of follow-up diagnostics related to anticoagulants  Outcome: Progressing Towards Goal  Goal: *Ability to perform ADLs and demonstrates progressive mobility and function  Outcome: Progressing Towards Goal  Goal: *Absence of DVT(Stroke Metric)  Outcome: Progressing Towards Goal  Goal: *Describes available resources and support systems  Outcome: Progressing Towards Goal  Goal: *Verbalizes understanding of activation of [id number]) for stroke symptoms(Stroke Metric)  Outcome: Progressing Towards Goal  Goal: *Understands and describes signs and symptoms to report to providers(Stroke Metric)  Outcome: Progressing Towards Goal

## 2022-05-05 NOTE — PROGRESS NOTES
[address] [facility] Adult  Hospitalist Group                                                                                          Hospitalist Progress Note  Jed Gaming MD  Answering service: [phone] OR [phone] from in house phone        Date of Service:  2022  NAME:  [place]  :  1955  MRN:  [id number]      Admission Summary:   Per H and P \"78year-old female with PMHx of schizophrenia who presents to the ED as a transfer from an OSH ED for right-sided mixed density right parietal parenchymal hemorrhage.  Per chart review, her symptoms began yesterday morning with left-sided hemiparesis.  Family brought her to the OSH today after she became more lethargic with AMS.  Neurosurgery evaluated patient and no need for neurosurgical intervention at this time. Urbano Weston was started on Cardene in the ED for hypertension and Precedex for agitation.  OSH was unable to perform CTA due to agitation.  Will attempt CTA while on Precedex.  She will be admitted to the ICU for further medical management\"     Interval history / Subjective:   Pt seen and examined on restraint      Assessment & Plan:     # Right parieto-occipital and right falcine hemorrhages  -. Right parieto-occipital and right falcine hemorrhages with associated edema  and mass effect and leftward midline shift are not significantly changed from  prior. There is new the evident layering intraventricular hemorrhage in the  posterior left lateral ventricular horn. Conservative management per NSGY  -neurology following  -she is confused and intermitent  agiattaion     #Dysphagia: In restraints, pulled NG tube pall care consulted and GI for PEG      #HTN: BP uncontrolled   Goal less than 140  -on losartan  Prn labetalol  Start Cardene    # elevated WBC - ?  Aspiration   - check CXR     # acidosis  With gap  - start bicarb drip   - check lactate      # UTI - on ceftriaxone follow up cultures ( none ordered)      Code status: Partial  DVT prophylaxis: SCD    Care Plan discussed with: Nurse  Anticipated Disposition: SNF/LTC  Anticipated Discharge: Greater than 48 hours     Hospital Problems  Date Reviewed: 6/9/2015          Codes Class Noted POA    Hemorrhagic stroke [facility]) ICD-10-CM: I61.9  ICD-9-CM: 740  5/1/2022 Unknown                Review of Systems:   Review of systems not obtained due to patient factors. Vital Signs:    Last 24hrs VS reviewed since prior progress note. Most recent are:  Visit Vitals  /79 (BP 1 Location: Left arm, BP Patient Position: At rest)   Pulse (!) 120   Temp 100.1 °F (37.8 °C)   Resp 22   Ht 4' 7\" (1.397 m)   Wt 43.5 kg (95 lb 14.4 oz)   SpO2 98%   BMI 22.29 kg/m²         Intake/Output Summary (Last 24 hours) at 5/5/2022 1104  Last data filed at 5/5/2022 7303  Gross per 24 hour   Intake 300 ml   Output 1400 ml   Net -1100 ml        Physical Examination:     I had a face to face encounter with this patient and independently examined them on 5/5/2022 as outlined below:          General : alert x 0, awake, no acute distress,  HEENT: PEERL, EOMI, moist mucus membrane, TM clear  Neck: supple, no JVD, no meningeal signs  Chest: Clear to auscultation bilaterally   CVS: S1 S2 heard, Capillary refill less than 2 seconds  Abd: soft/ Non tender, non distended, BS physiological,   Ext: no clubbing, no cyanosis, no edema, brisk 2+ DP pulses  Neuro/Psych: pleasant mood and affect  Skin: warm     Data Review:    I personally reviewed  Image and labs      Labs:     Recent Labs     05/05/22 0211 05/04/22 0330   WBC 13.2* 10.4   HGB 11.8 10.3*   HCT 34.4* 31.4*    210     Recent Labs     05/05/22 0211 05/04/22 0330   * 140   K 2.8* 3.1*    111*   CO2 16* 17*   BUN 6 9   CREA 0.52* 0.57   * 82   CA 8.9 8.2*     No results for input(s): ALT, AP, TBIL, TBILI, TP, ALB, GLOB, GGT, AML, LPSE in the last 72 hours.     No lab exists for component: SGOT, GPT, AMYP, HLPSE  No results for input(s): INR, PTP, APTT, INREXT in the last 72 hours. No results for input(s): FE, TIBC, PSAT, FERR in the last 72 hours. No results found for: FOL, RBCF   No results for input(s): PH, PCO2, PO2 in the last 72 hours. No results for input(s): CPK, CKNDX, TROIQ in the last 72 hours.     No lab exists for component: CPKMB  Lab Results   Component Value Date/Time    Cholesterol, total 161 05/02/2022 05:22 AM    HDL Cholesterol 69 05/02/2022 05:22 AM    LDL, calculated 77.6 05/02/2022 05:22 AM    Triglyceride 72 05/02/2022 05:22 AM    CHOL/HDL Ratio 2.3 05/02/2022 05:22 AM     Lab Results   Component Value Date/Time    Glucose  fasting 10/30/2014 09:12 AM    Glucose POC 04 fasting 10/30/2014 08:56 AM     Lab Results   Component Value Date/Time    Color YELLOW/STRAW 05/02/2022 05:22 AM    Appearance CLEAR 05/02/2022 05:22 AM    Specific gravity 1.010 05/02/2022 05:22 AM    pH (UA) 6.0 05/02/2022 05:22 AM    Protein Negative 05/02/2022 05:22 AM    Glucose Negative 05/02/2022 05:22 AM    Ketone TRACE (A) 05/02/2022 05:22 AM    Bilirubin Negative 05/02/2022 05:22 AM    Urobilinogen 1.0 05/02/2022 05:22 AM    Nitrites Positive (A) 05/02/2022 05:22 AM    Leukocyte Esterase SMALL (A) 05/02/2022 05:22 AM    Epithelial cells FEW 05/02/2022 05:22 AM    Bacteria 4+ (A) 05/02/2022 05:22 AM    WBC 10-20 05/02/2022 05:22 AM    RBC 0-5 05/02/2022 05:22 AM         Medications Reviewed:     Current Facility-Administered Medications   Medication Dose Route Frequency    dextrose 5% infusion  50 mL/hr IntraVENous CONTINUOUS    hydrALAZINE (APRESOLINE) 20 mg/mL injection 10 mg  10 mg IntraVENous Q6H PRN    QUEtiapine (SEROquel) tablet 50 mg  50 mg Oral BID    cefTRIAXone (ROCEPHIN) 1 g in 0.9% sodium chloride 10 mL IV syringe  1 g IntraVENous Q24H    losartan (COZAAR) tablet 50 mg  50 mg Oral DAILY    lidocaine 4 % patch 1 Patch  1 Patch TransDERmal Q24H    metoclopramide HCl (REGLAN) injection 5 mg  5 mg IntraVENous Q6H PRN    levETIRAcetam (KEPPRA) injection 500 mg  500 mg IntraVENous Q12H    labetaloL (NORMODYNE;TRANDATE) injection 10 mg  10 mg IntraVENous Q2H PRN    sodium chloride (NS) flush 5-40 mL  5-40 mL IntraVENous Q8H    sodium chloride (NS) flush 5-40 mL  5-40 mL IntraVENous PRN    acetaminophen (TYLENOL) tablet 650 mg  650 mg Oral Q6H PRN    Or    acetaminophen (TYLENOL) suppository 650 mg  650 mg Rectal Q6H PRN    polyethylene glycol (MIRALAX) packet 17 g  17 g Oral DAILY PRN    ondansetron (ZOFRAN ODT) tablet 4 mg  4 mg Oral Q8H PRN    Or    ondansetron (ZOFRAN) injection 4 mg  4 mg IntraVENous Q6H PRN     ______________________________________________________________________  EXPECTED LENGTH OF STAY: 2d 21h  ACTUAL LENGTH OF STAY:          4      Please note that this dictation was completed with [employer], the computer voice recognition software. Quite often unanticipated grammatical, syntax, homophones, and other interpretive errors are inadvertently transcribed by the computer software. Please disregard these errors. Please excuse any errors that have escaped final proofreading.                 Ana Luisa Bell MD

## 2022-05-06 ENCOUNTER — HOSPICE ADMISSION (OUTPATIENT)
Dept: HOSPICE | Facility: HOSPICE | Age: 67
End: 2022-05-06

## 2022-05-06 LAB
ANION GAP SERPL CALC-SCNC: 10 MMOL/L (ref 5–15)
BASOPHILS # BLD: 0.1 K/UL (ref 0–0.1)
BASOPHILS NFR BLD: 1 % (ref 0–1)
BUN SERPL-MCNC: 7 MG/DL (ref 6–20)
BUN/CREAT SERPL: 15 (ref 12–20)
CALCIUM SERPL-MCNC: 8.9 MG/DL (ref 8.5–10.1)
CHLORIDE SERPL-SCNC: 107 MMOL/L (ref 97–108)
CO2 SERPL-SCNC: 22 MMOL/L (ref 21–32)
CREAT SERPL-MCNC: 0.48 MG/DL (ref 0.55–1.02)
DIFFERENTIAL METHOD BLD: ABNORMAL
EOSINOPHIL # BLD: 0.1 K/UL (ref 0–0.4)
EOSINOPHIL NFR BLD: 1 % (ref 0–7)
ERYTHROCYTE [DISTWIDTH] IN BLOOD BY AUTOMATED COUNT: 12.7 % (ref 11.5–14.5)
GLUCOSE SERPL-MCNC: 109 MG/DL (ref 65–100)
HCT VFR BLD AUTO: 34.7 % (ref 35–47)
HGB BLD-MCNC: 11.8 G/DL (ref 11.5–16)
IMM GRANULOCYTES # BLD AUTO: 0 K/UL (ref 0–0.04)
IMM GRANULOCYTES NFR BLD AUTO: 0 % (ref 0–0.5)
LYMPHOCYTES # BLD: 1.5 K/UL (ref 0.8–3.5)
LYMPHOCYTES NFR BLD: 14 % (ref 12–49)
MCH RBC QN AUTO: 29.3 PG (ref 26–34)
MCHC RBC AUTO-ENTMCNC: 34 G/DL (ref 30–36.5)
MCV RBC AUTO: 86.1 FL (ref 80–99)
MONOCYTES # BLD: 1.1 K/UL (ref 0–1)
MONOCYTES NFR BLD: 10 % (ref 5–13)
NEUTS SEG # BLD: 8.1 K/UL (ref 1.8–8)
NEUTS SEG NFR BLD: 74 % (ref 32–75)
NRBC # BLD: 0 K/UL (ref 0–0.01)
NRBC BLD-RTO: 0 PER 100 WBC
PLATELET # BLD AUTO: 303 K/UL (ref 150–400)
PMV BLD AUTO: 10.7 FL (ref 8.9–12.9)
POTASSIUM SERPL-SCNC: 2.7 MMOL/L (ref 3.5–5.1)
RBC # BLD AUTO: 4.03 M/UL (ref 3.8–5.2)
SODIUM SERPL-SCNC: 139 MMOL/L (ref 136–145)
WBC # BLD AUTO: 10.9 K/UL (ref 3.6–11)

## 2022-05-06 PROCEDURE — 74011000250 HC RX REV CODE- 250: Performed by: INTERNAL MEDICINE

## 2022-05-06 PROCEDURE — 74011000250 HC RX REV CODE- 250: Performed by: NURSE PRACTITIONER

## 2022-05-06 PROCEDURE — 85025 COMPLETE CBC W/AUTO DIFF WBC: CPT

## 2022-05-06 PROCEDURE — 74011000250 HC RX REV CODE- 250: Performed by: HOSPITALIST

## 2022-05-06 PROCEDURE — 74011250636 HC RX REV CODE- 250/636: Performed by: PSYCHIATRY & NEUROLOGY

## 2022-05-06 PROCEDURE — 80048 BASIC METABOLIC PNL TOTAL CA: CPT

## 2022-05-06 PROCEDURE — 99233 SBSQ HOSP IP/OBS HIGH 50: CPT | Performed by: NURSE PRACTITIONER

## 2022-05-06 PROCEDURE — 74011250636 HC RX REV CODE- 250/636: Performed by: INTERNAL MEDICINE

## 2022-05-06 PROCEDURE — 65270000046 HC RM TELEMETRY

## 2022-05-06 PROCEDURE — 36415 COLL VENOUS BLD VENIPUNCTURE: CPT

## 2022-05-06 PROCEDURE — 74011250636 HC RX REV CODE- 250/636: Performed by: NURSE PRACTITIONER

## 2022-05-06 RX ORDER — POTASSIUM CHLORIDE 7.45 MG/ML
10 INJECTION INTRAVENOUS
Status: COMPLETED | OUTPATIENT
Start: 2022-05-06 | End: 2022-05-06

## 2022-05-06 RX ADMIN — POTASSIUM CHLORIDE 10 MEQ: 7.46 INJECTION, SOLUTION INTRAVENOUS at 06:02

## 2022-05-06 RX ADMIN — SODIUM BICARBONATE: 84 INJECTION, SOLUTION INTRAVENOUS at 05:46

## 2022-05-06 RX ADMIN — SODIUM CHLORIDE, PRESERVATIVE FREE 10 ML: 5 INJECTION INTRAVENOUS at 06:10

## 2022-05-06 RX ADMIN — SODIUM CHLORIDE, PRESERVATIVE FREE 10 ML: 5 INJECTION INTRAVENOUS at 13:52

## 2022-05-06 RX ADMIN — SODIUM BICARBONATE: 84 INJECTION, SOLUTION INTRAVENOUS at 19:41

## 2022-05-06 RX ADMIN — SODIUM CHLORIDE, PRESERVATIVE FREE 10 ML: 5 INJECTION INTRAVENOUS at 22:00

## 2022-05-06 RX ADMIN — POTASSIUM CHLORIDE 10 MEQ: 7.46 INJECTION, SOLUTION INTRAVENOUS at 04:17

## 2022-05-06 RX ADMIN — LEVETIRACETAM 500 MG: 100 INJECTION, SOLUTION, CONCENTRATE INTRAVENOUS at 13:37

## 2022-05-06 RX ADMIN — POTASSIUM CHLORIDE 10 MEQ: 7.46 INJECTION, SOLUTION INTRAVENOUS at 07:19

## 2022-05-06 RX ADMIN — METOPROLOL TARTRATE 2.5 MG: 1 INJECTION, SOLUTION INTRAVENOUS at 13:37

## 2022-05-06 RX ADMIN — CEFTRIAXONE SODIUM 1 G: 1 INJECTION, POWDER, FOR SOLUTION INTRAMUSCULAR; INTRAVENOUS at 13:37

## 2022-05-06 RX ADMIN — METOPROLOL TARTRATE 2.5 MG: 1 INJECTION, SOLUTION INTRAVENOUS at 19:37

## 2022-05-06 RX ADMIN — METOPROLOL TARTRATE 2.5 MG: 1 INJECTION, SOLUTION INTRAVENOUS at 06:02

## 2022-05-06 RX ADMIN — LEVETIRACETAM 500 MG: 100 INJECTION, SOLUTION, CONCENTRATE INTRAVENOUS at 01:44

## 2022-05-06 NOTE — PROGRESS NOTES
Palliative Medicine Consult  Darrin: [phone] ([phone])    Patient Name: Jennifer Siegel  YOB: 1955    Date of Initial Consult: May 3, 2022  Reason for Consult: Care Decisions  Requesting Provider: Dr. Shabana Fagan  Primary Care Physician: None     SUMMARY:   Jennifer Siegel is a 78 y.o. Estonian-speaking female with a past history of schizophrenia, ?hypertension, who was admitted on 5/1/2022 from [address] [place] [address] emergency department with a diagnosis of right-sided mixed density right parietal parenchymal hemorrhage, left sided hemiparesis, AMS. Seen by neurosurgery no intervention recommended at this time. Current medical issues leading to Palliative Medicine involvement include: We have been asked to address care goals. DNI   PALLIATIVE DIAGNOSES:   1. Palliative care encounter  2. Acute hemorrhagic stroke  3. Agitation  4. Feeding difficulties  5. Language barrier  6. Debility  7. Goals of care     PLAN:   1. Daughter does not speak [employer] her  served as the . 2. Inquired about understanding of the patient's condition~daughter stated that her mother has had a stroke. Further explained that because of the stroke she is weak on the left side and unable to swallow safely. 3. Explained that the team needs to know how aggressive the patient would want to be managed if she could make decisions. Explained that her mental illness further complicates her medical picture. 4. I explained that we could place a tube in the patient and feed her understanding she is at risk for pulling the tube out or she could take the pt home to care for her and feed her for comfort with food the consistency of baby food. 5. Daughter stated that she does not want the patient to have a tube placed. They have elected bring the patient home to continue to care for her and keeping her comfortable. 6. Informed her that the  will be contacting her for discharge arrangements  7.  Unfortunately, the pt does not have benefits for hospice care. Will discuss with the hospice team.  She is at risk for seizures from stroke and is on medications. She also has intermittent agitation. Javad Reed She was cared for by her family prior to admission. She required 24 hour supervision due to mental illness. Unfortunate situation     8. Initial consult note routed to primary continuity provider and/or primary health care team members  9. Communicated plan of care with: Palliative [facility] Team     GOALS OF CARE / TREATMENT PREFERENCES:     GOALS OF CARE:  Patient/Health Care Proxy Stated Goals: Other (comment) (to be discussed)    TREATMENT PREFERENCES:   Code Status: Partial Code    Patient and family's personal goals include: UTO    Important upcoming milestones or family events: UTO    The patient identifies the following as important for living well: UTO      Advance Care Planning:  [x] [facility] Interdisciplinary Team has updated the ACP Navigator with [address] and Patient Capacity      Primary Decision Maker (Active): Braxton Steele Child - [phone]  No flowsheet data found. Other:    As far as possible, the palliative care team has discussed with patient / health care proxy about goals of care / treatment preferences for patient.      HISTORY:     History obtained from: Chart, team    CHIEF COMPLAINT: Patient admitted with acute hemorrhagic stroke    HPI/SUBJECTIVE:    The patient is:   [] Verbal and participatory  [x] Non-participatory due to:   Medical condition      Clinical Pain Assessment (nonverbal scale for severity on nonverbal patients):   Clinical Pain Assessment  Severity: 0     Activity (Movement): Lying quietly, normal position    Duration: for how long has pt been experiencing pain (e.g., 2 days, 1 month, years)  Frequency: how often pain is an issue (e.g., several times per day, once every few days, constant)     FUNCTIONAL ASSESSMENT:     Palliative Performance Scale (PPS):  PPS: 20       PSYCHOSOCIAL/SPIRITUAL SCREENING:     Palliative IDT has assessed this patient for cultural preferences / practices and a referral made as appropriate to needs (Cultural Services, Patient Advocacy, Ethics, etc.)    Any spiritual / Restorationist concerns:  [] Yes /  [] No UTO  If \"Yes\" to discuss with pastoral care during IDT     Does caregiver feel burdened by caring for their loved one:   [] Yes /  [] No /  [x] No Caregiver Present    If \"Yes\" to discuss with social work during IDT    Anticipatory grief assessment: UTO  [] Normal  / [] Maladaptive     If \"Maladaptive\" to discuss with social work during IDT    ESAS Anxiety: Anxiety: 0    ESAS Depression:          REVIEW OF SYSTEMS:     Positive and pertinent negative findings in ROS are noted above in HPI. The following systems were [] reviewed / [x] unable to be reviewed as noted in HPI  Other findings are noted below. Systems: constitutional, ears/nose/mouth/throat, respiratory, gastrointestinal, genitourinary, musculoskeletal, integumentary, neurologic, psychiatric, endocrine. Positive findings noted below. Modified ESAS Completed by: provider   Fatigue: 10 Drowsiness: 10     Pain: 0   Anxiety: 0       Dyspnea: 0                    PHYSICAL EXAM:     From RN flowsheet:  Wt Readings from Last 3 Encounters:   05/06/22 118 lb 9.7 oz (53.8 kg)   06/09/15 117 lb (53.1 kg)   02/12/15 116 lb (52.6 kg)     Blood pressure 137/83, pulse 85, temperature 99 °F (37.2 °C), resp. rate 19, height 4' 7\" (1.397 m), weight 118 lb 9.7 oz (53.8 kg), SpO2 97 %.     Pain Scale 1: Adult Nonverbal Pain Scale  Pain Intensity 1: 0              Pain Intervention(s) 1: MD notified (comment),Repositioned  Last bowel movement, if known:     Constitutional: Patient nonverbal, NAD, somnolent, well nourished  Eyes: Closed  ENMT: no nasal discharge, moist mucous membranes  Cardiovascular:  Respiratory: breathing not labored, symmetric  Gastrointestinal: soft non-tender  Musculoskeletal: no deformity, no tenderness to palpation  Skin: warm, dry  Neurologic: Acute stroke  Psychiatric: intermittent agitation  Other:       HISTORY:     Active Problems:    Hemorrhagic stroke ([address] (5/1/2022)      Past Medical History:   Diagnosis Date    Psychotic disorder     concern for schizoaffective disorder      Past Surgical History:   Procedure Laterality Date    HX TUBAL LIGATION        Family History   Problem Relation Age of Onset    Diabetes Neg Hx     Heart Disease Neg Hx     Psychiatric Disorder Neg Hx       History reviewed, no pertinent family history.   Social History     Tobacco Use    Smoking status: Never Smoker    Smokeless tobacco: Not on file   Substance Use Topics    Alcohol use: No     No Known Allergies   Current Facility-Administered Medications   Medication Dose Route Frequency    hydrALAZINE (APRESOLINE) 20 mg/mL injection 10 mg  10 mg IntraVENous Q6H PRN    niCARdipine (CARDENE) 25 mg in 0.9% sodium chloride 250 mL infusion  0-15 mg/hr IntraVENous TITRATE    dextrose 5 % - 0.45% NaCl 1,000 mL with sodium bicarbonate (8.4%) 75 mEq infusion   IntraVENous CONTINUOUS    metoprolol (LOPRESSOR) injection 2.5 mg  2.5 mg IntraVENous Q6H    QUEtiapine (SEROquel) tablet 50 mg  50 mg Oral BID    cefTRIAXone (ROCEPHIN) 1 g in 0.9% sodium chloride 10 mL IV syringe  1 g IntraVENous Q24H    losartan (COZAAR) tablet 50 mg  50 mg Oral DAILY    lidocaine 4 % patch 1 Patch  1 Patch TransDERmal Q24H    metoclopramide HCl (REGLAN) injection 5 mg  5 mg IntraVENous Q6H PRN    levETIRAcetam (KEPPRA) injection 500 mg  500 mg IntraVENous Q12H    labetaloL (NORMODYNE;TRANDATE) injection 10 mg  10 mg IntraVENous Q2H PRN    sodium chloride (NS) flush 5-40 mL  5-40 mL IntraVENous Q8H    sodium chloride (NS) flush 5-40 mL  5-40 mL IntraVENous PRN    acetaminophen (TYLENOL) tablet 650 mg  650 mg Oral Q6H PRN    Or    acetaminophen (TYLENOL) suppository 650 mg  650 mg Rectal Q6H PRN    polyethylene glycol (MIRALAX) packet 17 g  17 g Oral DAILY PRN    ondansetron (ZOFRAN ODT) tablet 4 mg  4 mg Oral Q8H PRN    Or    ondansetron (ZOFRAN) injection 4 mg  4 mg IntraVENous Q6H PRN          LAB AND IMAGING FINDINGS:     Lab Results   Component Value Date/Time    WBC 10.9 05/06/2022 02:22 AM    HGB 11.8 05/06/2022 02:22 AM    PLATELET 203 49/62/1535 02:22 AM     Lab Results   Component Value Date/Time    Sodium 139 05/06/2022 02:22 AM    Potassium 2.7 (LL) 05/06/2022 02:22 AM    Chloride 107 05/06/2022 02:22 AM    CO2 22 05/06/2022 02:22 AM    BUN 7 05/06/2022 02:22 AM    Creatinine 0.48 (L) 05/06/2022 02:22 AM    Calcium 8.9 05/06/2022 02:22 AM      Lab Results   Component Value Date/Time    Alk. phosphatase 87 01/15/2015 11:40 AM    Protein, total 8.1 01/15/2015 11:40 AM    Albumin 4.6 01/15/2015 11:40 AM    Globulin 3.5 01/15/2015 11:40 AM     Lab Results   Component Value Date/Time    INR 1.0 05/01/2022 11:28 PM    Prothrombin time 10.9 05/01/2022 11:28 PM    aPTT 26.0 05/01/2022 11:28 PM      No results found for: IRON, FE, TIBC, IBCT, PSAT, FERR   No results found for: PH, PCO2, PO2  No components found for: GLPOC   No results found for: CPK, CKMB             Total time: 35 minutes  Counseling / coordination time, spent as noted above: 30 minutes  > 50% counseling / coordination?: y    Prolonged service was provided for  []30 min   []75 min in face to face time in the presence of the patient, spent as noted above. Time Start:   Time End:   Note: this can only be billed with 41868 (initial) or 12751 (follow up). If multiple start / stop times, list each separately.

## 2022-05-06 NOTE — PROGRESS NOTES
Transition of Care Plan  RUR 12%    Palliative talked with daughter and son in law today  Hospice referral to [address] 10:00 am 5/7/22 with family to complete financial aid application and determine dme that is needed. Disposition    Home with family/  home hospice  Daughter does not want PEG    Transportation    BLS--   (not insured -- approved by   , Jennifer Abrams for Hospital [facility] [phone]. CM called [facility]  and the cost is $235.35 (if patient returns to her apt on [address]-second floor apt-but-she may go to her daughter's home and cost will be different)     Hospice --[facility]-- CM spoke with [facility] nurse, -- family meeting 5/7/22 to assist with completion of financial application and determine dme needs. Contact  Daughter Florentino Restrepo and Gregor (speaks [place])  [phone]  Niece Earnest Torres [phone]  [facility] [phone] needed for daughter and niece     MATTHIAS talked with Olivia Benavidez, Palliative NP, and was informed that she had spoken with family on the phone about transition of care planning. Daughter does not want PEG tube. She wants to bring patient home and care for her. She is open to speaking with Hospice    Patient not eligible for hospice-- no insurance  No green card. MATTHIAS talked with [address] [place] [address] nurseSj and family can apply for financial aid assistance. An application for assistance in [place] and [place] will be left in patient's room for family to complete   Order secured and referral sent via CC link     MATTHIAS talked with son in law, Elsa Cornejo, on phone. Matthias informed him of the hospice referral and that a hospice nurse will be contacting him about the hospice and the financial application     He said he is not sure if patient will discharge to her apartment or to his house.   Patient daughter, Lola Buchanan,  (his wife) is at work and they are going to discuss where patient will go when discharged when she gets home. He said patient's  is currently in the hospital.      CM will follow and assist with transportation home. Huy Lee, CM ,  approved the [facility]. CM will confirm where patient will be discharged to and the cost of transport and  complete service agreement.     3:55   Call from Tiara--She spokew ith DENISE--  Meeting scheduled tomorrow 5/7/22 with daughter and DENISE to complete financial application and determine dme needs

## 2022-05-06 NOTE — PROGRESS NOTES
[facility] Adult  Hospitalist Group                                                                                          Hospitalist Progress Note  Johan Barragan MD  Answering service: [phone] OR [phone] from in house phone        Date of Service:  2022  NAME:  Erasto Randall  :  1955  MRN:  [id number]      Admission Summary:   Per H and P \"78year-old female with PMHx of schizophrenia who presents to the ED as a transfer from an OSH ED for right-sided mixed density right parietal parenchymal hemorrhage.  Per chart review, her symptoms began yesterday morning with left-sided hemiparesis.  Family brought her to the OSH today after she became more lethargic with AMS.  Neurosurgery evaluated patient and no need for neurosurgical intervention at this time. Cindy Henriquez was started on Cardene in the ED for hypertension and Precedex for agitation.  OSH was unable to perform CTA due to agitation.  Will attempt CTA while on Precedex.  She will be admitted to the ICU for further medical management\"     Interval history / Subjective:   Family wants no PEG wants to take her home   Home hospice pt does not have insurance      Assessment & Plan:     # Right parieto-occipital and right falcine hemorrhages  -. Right parieto-occipital and right falcine hemorrhages with associated edema  and mass effect and leftward midline shift are not significantly changed from  prior. There is new the evident layering intraventricular hemorrhage in the  posterior left lateral ventricular horn. Conservative management per NSGY  -neurology following  -she is confused and intermitent  agiattaion     #Dysphagia: comfort feeding      #HTN: BP uncontrolled   Goal less than 140  -on losartan Cardene     # elevated WBC - ?  Aspiration   - check CXR     # acidosis  With gap  - start bicarb drip   - check lactate      # UTI - on ceftriaxone follow up cultures ( none ordered)      Code status: Partial family  want's home hospice DVT prophylaxis: SCD    Care Plan discussed with: Nurse  Anticipated Disposition: SNF/LTC  Anticipated Discharge: Greater than 48 hours     Hospital Problems  Date Reviewed: 6/9/2015          Codes Class Noted POA    Hemorrhagic stroke [facility]) ICD-10-CM: I61.9  ICD-9-CM: 329  5/1/2022 Unknown                Review of Systems:   Review of systems not obtained due to patient factors. Vital Signs:    Last 24hrs VS reviewed since prior progress note. Most recent are:  Visit Vitals  /70 (BP 1 Location: Left arm)   Pulse 83   Temp 99.1 °F (37.3 °C)   Resp 23   Ht 4' 7\" (1.397 m)   Wt 54.6 kg (120 lb 5.9 oz)   SpO2 97%   BMI 27.98 kg/m²         Intake/Output Summary (Last 24 hours) at 5/6/2022 1452  Last data filed at 5/6/2022 0602  Gross per 24 hour   Intake 2006.67 ml   Output 1000 ml   Net 1006.67 ml        Physical Examination:     I had a face to face encounter with this patient and independently examined them on 5/6/2022 as outlined below:          General : alert x 0, awake, no acute distress,  HEENT: PEERL, EOMI, moist mucus membrane, TM clear  Neck: supple, no JVD, no meningeal signs  Chest: Clear to auscultation bilaterally   CVS: S1 S2 heard, Capillary refill less than 2 seconds  Abd: soft/ Non tender, non distended, BS physiological,   Ext: no clubbing, no cyanosis, no edema, brisk 2+ DP pulses  Neuro/Psych: pleasant mood and affect  Skin: warm     Data Review:    I personally reviewed  Image and labs      Labs:     Recent Labs     05/06/22 0222 05/05/22 0211   WBC 10.9 13.2*   HGB 11.8 11.8   HCT 34.7* 34.4*    287     Recent Labs     05/06/22 0222 05/05/22 0211 05/04/22  0330    135* 140   K 2.7* 2.8* 3.1*    103 111*   CO2 22 16* 17*   BUN 7 6 9   CREA 0.48* 0.52* 0.57   * 108* 82   CA 8.9 8.9 8.2*     No results for input(s): ALT, AP, TBIL, TBILI, TP, ALB, GLOB, GGT, AML, LPSE in the last 72 hours.     No lab exists for component: SGOT, GPT, AMYP, HLPSE  No results for input(s): INR, PTP, APTT, INREXT, INREXT in the last 72 hours. No results for input(s): FE, TIBC, PSAT, FERR in the last 72 hours. No results found for: FOL, RBCF   No results for input(s): PH, PCO2, PO2 in the last 72 hours. No results for input(s): CPK, CKNDX, TROIQ in the last 72 hours.     No lab exists for component: CPKMB  Lab Results   Component Value Date/Time    Cholesterol, total 161 05/02/2022 05:22 AM    HDL Cholesterol 69 05/02/2022 05:22 AM    LDL, calculated 77.6 05/02/2022 05:22 AM    Triglyceride 72 05/02/2022 05:22 AM    CHOL/HDL Ratio 2.3 05/02/2022 05:22 AM     Lab Results   Component Value Date/Time    Glucose  fasting 10/30/2014 09:12 AM    Glucose POC 04 fasting 10/30/2014 08:56 AM     Lab Results   Component Value Date/Time    Color YELLOW/STRAW 05/02/2022 05:22 AM    Appearance CLEAR 05/02/2022 05:22 AM    Specific gravity 1.010 05/02/2022 05:22 AM    pH (UA) 6.0 05/02/2022 05:22 AM    Protein Negative 05/02/2022 05:22 AM    Glucose Negative 05/02/2022 05:22 AM    Ketone TRACE (A) 05/02/2022 05:22 AM    Bilirubin Negative 05/02/2022 05:22 AM    Urobilinogen 1.0 05/02/2022 05:22 AM    Nitrites Positive (A) 05/02/2022 05:22 AM    Leukocyte Esterase SMALL (A) 05/02/2022 05:22 AM    Epithelial cells FEW 05/02/2022 05:22 AM    Bacteria 4+ (A) 05/02/2022 05:22 AM    WBC 10-20 05/02/2022 05:22 AM    RBC 0-5 05/02/2022 05:22 AM         Medications Reviewed:     Current Facility-Administered Medications   Medication Dose Route Frequency    hydrALAZINE (APRESOLINE) 20 mg/mL injection 10 mg  10 mg IntraVENous Q6H PRN    niCARdipine (CARDENE) 25 mg in 0.9% sodium chloride 250 mL infusion  0-15 mg/hr IntraVENous TITRATE    dextrose 5 % - 0.45% NaCl 1,000 mL with sodium bicarbonate (8.4%) 75 mEq infusion   IntraVENous CONTINUOUS    metoprolol (LOPRESSOR) injection 2.5 mg  2.5 mg IntraVENous Q6H    QUEtiapine (SEROquel) tablet 50 mg  50 mg Oral BID    losartan (COZAAR) tablet 50 mg  50 mg Oral DAILY    lidocaine 4 % patch 1 Patch  1 Patch TransDERmal Q24H    metoclopramide HCl (REGLAN) injection 5 mg  5 mg IntraVENous Q6H PRN    levETIRAcetam (KEPPRA) injection 500 mg  500 mg IntraVENous Q12H    labetaloL (NORMODYNE;TRANDATE) injection 10 mg  10 mg IntraVENous Q2H PRN    sodium chloride (NS) flush 5-40 mL  5-40 mL IntraVENous Q8H    sodium chloride (NS) flush 5-40 mL  5-40 mL IntraVENous PRN    acetaminophen (TYLENOL) tablet 650 mg  650 mg Oral Q6H PRN    Or    acetaminophen (TYLENOL) suppository 650 mg  650 mg Rectal Q6H PRN    polyethylene glycol (MIRALAX) packet 17 g  17 g Oral DAILY PRN    ondansetron (ZOFRAN ODT) tablet 4 mg  4 mg Oral Q8H PRN    Or    ondansetron (ZOFRAN) injection 4 mg  4 mg IntraVENous Q6H PRN     ______________________________________________________________________  EXPECTED LENGTH OF STAY: 2d 21h  ACTUAL LENGTH OF STAY:          5      Please note that this dictation was completed with [employer], the computer voice recognition software. Quite often unanticipated grammatical, syntax, homophones, and other interpretive errors are inadvertently transcribed by the computer software. Please disregard these errors. Please excuse any errors that have escaped final proofreading.                 Pavithra Mcbride MD

## 2022-05-06 NOTE — PROGRESS NOTES
[address]  [facility], [address]       GI PROGRESS NOTE  Kate Rollins, [facility] office  [phone] NP in-hospital cell phone M-F until 4:30  After 5pm or on weekends, please call  for physician on call      NAME: Pauline Anne   :  1955   MRN:  [id number]       Subjective:   No acute events. Objective:     VITALS:   Last 24hrs VS reviewed since prior progress note. Most recent are:  Visit Vitals  /83 (BP 1 Location: Left arm, BP Patient Position: At rest)   Pulse 93   Temp 99 °F (37.2 °C)   Resp 19   Ht 4' 7\" (1.397 m)   Wt 53.8 kg (118 lb 9.7 oz)   SpO2 97%   BMI 27.57 kg/m²       PHYSICAL EXAM:  General: no acute distress    Neurologic:  Alert and nonverbal  HEENT: EOMI, no scleral icterus   Lungs:  No increased WOB  Heart:  Regular rate  Abdomen: Soft, non-distended, no apparent tenderness. Extremities: warm  Psych:   Poor insight. Lab Data Reviewed:     Recent Results (from the past 24 hour(s))   LACTIC ACID    Collection Time: 22 12:03 PM   Result Value Ref Range    Lactic acid 1.9 0.4 - 2.0 MMOL/L   CBC WITH AUTOMATED DIFF    Collection Time: 22  2:22 AM   Result Value Ref Range    WBC 10.9 3.6 - 11.0 K/uL    RBC 4.03 3.80 - 5.20 M/uL    HGB 11.8 11.5 - 16.0 g/dL    HCT 34.7 (L) 35.0 - 47.0 %    MCV 86.1 80.0 - 99.0 FL    MCH 29.3 26.0 - 34.0 PG    MCHC 34.0 30.0 - 36.5 g/dL    RDW 12.7 11.5 - 14.5 %    PLATELET 588 092 - 286 K/uL    MPV 10.7 8.9 - 12.9 FL    NRBC 0.0 0  WBC    ABSOLUTE NRBC 0.00 0.00 - 0.01 K/uL    NEUTROPHILS 74 32 - 75 %    LYMPHOCYTES 14 12 - 49 %    MONOCYTES 10 5 - 13 %    EOSINOPHILS 1 0 - 7 %    BASOPHILS 1 0 - 1 %    IMMATURE GRANULOCYTES 0 0.0 - 0.5 %    ABS. NEUTROPHILS 8.1 (H) 1.8 - 8.0 K/UL    ABS. LYMPHOCYTES 1.5 0.8 - 3.5 K/UL    ABS. MONOCYTES 1.1 (H) 0.0 - 1.0 K/UL    ABS. EOSINOPHILS 0.1 0.0 - 0.4 K/UL    ABS. BASOPHILS 0.1 0.0 - 0.1 K/UL    ABS. IMM.  GRANS. 0.0 0.00 - 0.04 K/UL    DF AUTOMATED     METABOLIC PANEL, BASIC    Collection Time: 05/06/22  2:22 AM   Result Value Ref Range    Sodium 139 136 - 145 mmol/L    Potassium 2.7 (LL) 3.5 - 5.1 mmol/L    Chloride 107 97 - 108 mmol/L    CO2 22 21 - 32 mmol/L    Anion gap 10 5 - 15 mmol/L    Glucose 109 (H) 65 - 100 mg/dL    BUN 7 6 - 20 MG/DL    Creatinine 0.48 (L) 0.55 - 1.02 MG/DL    BUN/Creatinine ratio 15 12 - 20      GFR est AA >60 >60 ml/min/1.73m2    GFR est non-AA >60 >60 ml/min/1.73m2    Calcium 8.9 8.5 - 10.1 MG/DL            Assessment:     · Dysphagia: SLP evaluated, pulled out NG tubes  · Acute hemorrhagic stroke (right superior frontoparietal acute intraparenchymal hemorrhage/acute subdural hemorrhage along falx): conservative management per neurosurgery  · Urinary tract infection: on antibiotics.    · Hypertension     Patient Active Problem List   Diagnosis Code    Schizoaffective disorder ([facility] F25.9    Hemorrhagic stroke ([facility] I61.9     Plan:     · Awaiting palliative care input, please call back next week if PEG is indicated and within goals of care      Signed By: Chary Sandoval NP     5/6/2022  11:20 AM           Agree with above  Will follow as needed this weekend

## 2022-05-06 NOTE — PROGRESS NOTES
PALLIATIVE MEDICINE        Discussed the case with hospice liason for [facility]. They are willing to evaluate the patient to see if they can offer services. Consult placed for hospice evaluation. Diagnosis would be malnutrition and stroke. Jose Amaya.  [address] MSN, FNP-BC, [facility]

## 2022-05-06 NOTE — PROGRESS NOTES
Bedside shift change report given to Boubacar Hernandes RN (oncoming nurse) by Kimberley Feliciano (offgoing nurse). Report included the following information SBAR, MAR and Recent Results.

## 2022-05-06 NOTE — HOSPICE
[facility]  Help to Those in Need  [phone]     Patient Name: Erasto Randall  YOB: 1955  Age: 78 [facility]. [facility]:  Returned call from [email or web address] in regards to this patient. Patient would likely meet criteria for home hospice services. Spoke to [employer], Patient is uninsured, undocumented resident. The only English speaking family member is a son in law. He will interpret for the family if needed  . [facility] is able to offer services through FAP program. Notified hospice SW of need for assistance. 03:45 Spoke to [facility] in regards to hospice services. He is not sure if patient will be returning to her home or going to his home as right now there is no one in the home to care for her ( spouse is currently hospitalized) Plan to meet bedside at 10am tomorrow 05/07 to assist with FAP and discharge planning    Thank you for the opportunity to be of service to this patient.

## 2022-05-06 NOTE — DISCHARGE INSTRUCTIONS
Discharge Instructions       PATIENT ID: Philipp South  MRN: [id number]   YOB: 1955    DATE OF ADMISSION: 5/1/2022  7:04 PM    DATE OF DISCHARGE: 5/6/2022    PRIMARY CARE PROVIDER: None     ATTENDING PHYSICIAN: Mirela Clifford MD  DISCHARGING PROVIDER: Trevor Ba MD    To contact this individual call [phone] and ask the  to page. If unavailable ask to be transferred the Adult Hospitalist Department. DISCHARGE DIAGNOSES  Right parieto-occipital and right falcine hemorrhages    CONSULTATIONS: IP CONSULT TO HOSPITALIST  IP CONSULT TO PALLIATIVE CARE - PROVIDER  IP CONSULT TO NEUROLOGY  IP CONSULT TO PALLIATIVE CARE - PROVIDER  IP CONSULT TO GASTROENTEROLOGY    PROCEDURES/SURGERIES: * No surgery found *    PENDING TEST RESULTS:   At the time of discharge the following test results are still pending:     FOLLOW UP APPOINTMENTS:   Follow-up Information    None          ADDITIONAL CARE RECOMMENDATIONS:  Home hospice    DIET: comfort feeding    ACTIVITY: Activity as tolerated    WOUND CARE:     EQUIPMENT needed:       Radiology      DISCHARGE MEDICATIONS:   See Medication Reconciliation Form    · It is important that you take the medication exactly as they are prescribed. · Keep your medication in the bottles provided by the pharmacist and keep a list of the medication names, dosages, and times to be taken in your wallet. · Do not take other medications without consulting your doctor. NOTIFY YOUR PHYSICIAN FOR ANY OF THE FOLLOWING:   Fever over 101 degrees for 24 hours. Chest pain, shortness of breath, fever, chills, nausea, vomiting, diarrhea, change in mentation, falling, weakness, bleeding. Severe pain or pain not relieved by medications. Or, any other signs or symptoms that you may have questions about.       DISPOSITION:    Home With:   OT  PT  HH  RN       SNF/Inpatient Rehab/LTAC    Independent/assisted living   x Hospice    Other:     CDMP Checked:   Yes x PROBLEM LIST Updated:  Yes x       Signed:   Dano Herndon MD  5/6/2022  2:38 PM

## 2022-05-06 NOTE — PROGRESS NOTES
Nutrition Note    Nutrition note re: ongoing NPO status with inability to maintain NG tube for meds/nutrition. Note family does not wish for PEG placement and plans noted for hospice care. Will follow peripherally, available PRN. Thank you.      Electronically signed by Nadeem Wharton RD on 5/6/2022 at 3:43 PM    Contact via [facility] or ext [phone]

## 2022-05-07 LAB
BACTERIA SPEC CULT: NORMAL
BASOPHILS # BLD: 0.1 K/UL (ref 0–0.1)
BASOPHILS NFR BLD: 1 % (ref 0–1)
DIFFERENTIAL METHOD BLD: ABNORMAL
EOSINOPHIL # BLD: 0.2 K/UL (ref 0–0.4)
EOSINOPHIL NFR BLD: 3 % (ref 0–7)
ERYTHROCYTE [DISTWIDTH] IN BLOOD BY AUTOMATED COUNT: 13 % (ref 11.5–14.5)
HCT VFR BLD AUTO: 31.8 % (ref 35–47)
HGB BLD-MCNC: 10.8 G/DL (ref 11.5–16)
IMM GRANULOCYTES # BLD AUTO: 0 K/UL (ref 0–0.04)
IMM GRANULOCYTES NFR BLD AUTO: 0 % (ref 0–0.5)
LYMPHOCYTES # BLD: 1.9 K/UL (ref 0.8–3.5)
LYMPHOCYTES NFR BLD: 23 % (ref 12–49)
MCH RBC QN AUTO: 29.8 PG (ref 26–34)
MCHC RBC AUTO-ENTMCNC: 34 G/DL (ref 30–36.5)
MCV RBC AUTO: 87.8 FL (ref 80–99)
MONOCYTES # BLD: 1 K/UL (ref 0–1)
MONOCYTES NFR BLD: 12 % (ref 5–13)
NEUTS SEG # BLD: 5.1 K/UL (ref 1.8–8)
NEUTS SEG NFR BLD: 61 % (ref 32–75)
NRBC # BLD: 0 K/UL (ref 0–0.01)
NRBC BLD-RTO: 0 PER 100 WBC
PLATELET # BLD AUTO: 269 K/UL (ref 150–400)
PMV BLD AUTO: 10.5 FL (ref 8.9–12.9)
RBC # BLD AUTO: 3.62 M/UL (ref 3.8–5.2)
SERVICE CMNT-IMP: NORMAL
WBC # BLD AUTO: 8.2 K/UL (ref 3.6–11)

## 2022-05-07 PROCEDURE — 65270000046 HC RM TELEMETRY

## 2022-05-07 PROCEDURE — 36591 DRAW BLOOD OFF VENOUS DEVICE: CPT

## 2022-05-07 PROCEDURE — 74011250636 HC RX REV CODE- 250/636: Performed by: HOSPITALIST

## 2022-05-07 PROCEDURE — 74011250636 HC RX REV CODE- 250/636: Performed by: PSYCHIATRY & NEUROLOGY

## 2022-05-07 PROCEDURE — 74011000250 HC RX REV CODE- 250: Performed by: INTERNAL MEDICINE

## 2022-05-07 PROCEDURE — 74011250637 HC RX REV CODE- 250/637: Performed by: HOSPITALIST

## 2022-05-07 PROCEDURE — 74011000250 HC RX REV CODE- 250: Performed by: NURSE PRACTITIONER

## 2022-05-07 PROCEDURE — 74011000250 HC RX REV CODE- 250: Performed by: HOSPITALIST

## 2022-05-07 PROCEDURE — 74011250636 HC RX REV CODE- 250/636: Performed by: NURSE PRACTITIONER

## 2022-05-07 PROCEDURE — 74011250637 HC RX REV CODE- 250/637: Performed by: INTERNAL MEDICINE

## 2022-05-07 PROCEDURE — 85025 COMPLETE CBC W/AUTO DIFF WBC: CPT

## 2022-05-07 RX ORDER — DEXTROSE, SODIUM CHLORIDE, AND POTASSIUM CHLORIDE 5; .45; .15 G/100ML; G/100ML; G/100ML
75 INJECTION INTRAVENOUS CONTINUOUS
Status: DISCONTINUED | OUTPATIENT
Start: 2022-05-07 | End: 2022-05-12

## 2022-05-07 RX ORDER — HALOPERIDOL 2 MG/ML
2 SOLUTION ORAL
Status: DISCONTINUED | OUTPATIENT
Start: 2022-05-07 | End: 2022-05-17 | Stop reason: HOSPADM

## 2022-05-07 RX ADMIN — LEVETIRACETAM 500 MG: 100 INJECTION, SOLUTION, CONCENTRATE INTRAVENOUS at 01:25

## 2022-05-07 RX ADMIN — SODIUM CHLORIDE, PRESERVATIVE FREE 10 ML: 5 INJECTION INTRAVENOUS at 14:57

## 2022-05-07 RX ADMIN — POTASSIUM CHLORIDE, DEXTROSE MONOHYDRATE AND SODIUM CHLORIDE 75 ML/HR: 150; 5; 450 INJECTION, SOLUTION INTRAVENOUS at 12:53

## 2022-05-07 RX ADMIN — ACETAMINOPHEN 650 MG: 325 TABLET ORAL at 21:59

## 2022-05-07 RX ADMIN — SODIUM CHLORIDE, PRESERVATIVE FREE 10 ML: 5 INJECTION INTRAVENOUS at 06:00

## 2022-05-07 RX ADMIN — METOCLOPRAMIDE HYDROCHLORIDE 5 MG: 5 INJECTION INTRAMUSCULAR; INTRAVENOUS at 21:59

## 2022-05-07 RX ADMIN — SODIUM CHLORIDE, PRESERVATIVE FREE 10 ML: 5 INJECTION INTRAVENOUS at 22:02

## 2022-05-07 RX ADMIN — LEVETIRACETAM 500 MG: 100 INJECTION, SOLUTION, CONCENTRATE INTRAVENOUS at 14:57

## 2022-05-07 RX ADMIN — METOPROLOL TARTRATE 2.5 MG: 1 INJECTION, SOLUTION INTRAVENOUS at 17:42

## 2022-05-07 RX ADMIN — HALOPERIDOL 2 MG: 2 SOLUTION ORAL at 22:38

## 2022-05-07 RX ADMIN — METOPROLOL TARTRATE 2.5 MG: 1 INJECTION, SOLUTION INTRAVENOUS at 11:25

## 2022-05-07 RX ADMIN — METOPROLOL TARTRATE 2.5 MG: 1 INJECTION, SOLUTION INTRAVENOUS at 01:25

## 2022-05-07 NOTE — PROGRESS NOTES
[facility] Adult  Hospitalist Group                                                                                          Hospitalist Progress Note  Anthony Colon MD  Answering service: [phone] from in house phone        Date of Service:  2022  NAME:  Donald Vyas  :  1955  MRN:  [id number]      Admission Summary:   Per H and P \"78year-old female with PMHx of schizophrenia who presents to the ED as a transfer from an OSH ED for right-sided mixed density right parietal parenchymal hemorrhage.  Per chart review, her symptoms began yesterday morning with left-sided hemiparesis.  Family brought her to the OSH today after she became more lethargic with AMS.  Neurosurgery evaluated patient and no need for neurosurgical intervention at this time. Kamron Khan was started on Cardene in the ED for hypertension and Precedex for agitation.  OSH was unable to perform CTA due to agitation.  Will attempt CTA while on Precedex.  She will be admitted to the ICU for further medical management\"     Interval history / Subjective:   confused  Family met with hospice   No insurance for hospice at home  Met with hospice this AM      Assessment & Plan:     # Right parieto-occipital and right falcine hemorrhages  -. Right parieto-occipital and right falcine hemorrhages with associated edema  and mass effect and leftward midline shift are not significantly changed from  prior. There is new the evident layering intraventricular hemorrhage in the  posterior left lateral ventricular horn. Conservative management per NSGY  -neurology following  -seroquel changed to Haldol   -c/w Keppra for total 7 days, started on , stop after      #Dysphagia: comfort feeding      #HTN: BP uncontrolled   Goal less than 140  -lopressor IV as patient NPO    # elevated WBC - ?  Aspiration   - check CXR     # acidosis  With gap  - resolved with bicarb gtt  - no labs this AM, transitioning to hospice, will defer labs  - c/w d5 1/2 NS with KCL today, change to d5 1/2 ns in AM      # UTI - off abx now      Code status: Partial family  want's home hospice , Partial code   DVT prophylaxis: SCD    Care Plan discussed with: Nurse  Anticipated Disposition: SNF/LTC  Anticipated Discharge: Greater than 48 hours     Hospital Problems  Date Reviewed: 6/9/2015          Codes Class Noted POA    Hemorrhagic stroke [facility]) ICD-10-CM: I61.9  ICD-9-CM: 528  5/1/2022 Unknown                Review of Systems:   Review of systems not obtained due to patient factors. Vital Signs:    Last 24hrs VS reviewed since prior progress note. Most recent are:  Visit Vitals  BP (!) 136/98   Pulse (!) 108   Temp 99.1 °F (37.3 °C)   Resp 14   Ht 4' 7\" (1.397 m)   Wt 53.3 kg (117 lb 8.1 oz)   SpO2 94%   BMI 27.31 kg/m²       No intake or output data in the 24 hours ending 05/07/22 1309     Physical Examination:     I had a face to face encounter with this patient and independently examined them on 5/7/2022 as outlined below:          General : alert x 0, awake, no acute distress,  Chest: Clear to auscultation bilaterally   CVS: S1 S2 heard, Capillary refill less than 2 seconds  Abd: soft/ Non tender, non distended, BS physiological,   Ext: no clubbing, no cyanosis, no edema, brisk 2+ DP pulses  Neuro/Psych: pleasant mood and affect  Skin: warm     Data Review:    I personally reviewed  Image and labs      Labs:     Recent Labs     05/07/22 0319 05/06/22 0222   WBC 8.2 10.9   HGB 10.8* 11.8   HCT 31.8* 34.7*    303     Recent Labs     05/06/22 0222 05/05/22  0211    135*   K 2.7* 2.8*    103   CO2 22 16*   BUN 7 6   CREA 0.48* 0.52*   * 108*   CA 8.9 8.9     No results for input(s): ALT, AP, TBIL, TBILI, TP, ALB, GLOB, GGT, AML, LPSE in the last 72 hours. No lab exists for component: SGOT, GPT, AMYP, HLPSE  No results for input(s): INR, PTP, APTT, INREXT, INREXT in the last 72 hours.    No results for input(s): FE, TIBC, PSAT, FERR in the last 72 hours. No results found for: FOL, RBCF   No results for input(s): PH, PCO2, PO2 in the last 72 hours. No results for input(s): CPK, CKNDX, TROIQ in the last 72 hours.     No lab exists for component: CPKMB  Lab Results   Component Value Date/Time    Cholesterol, total 161 05/02/2022 05:22 AM    HDL Cholesterol 69 05/02/2022 05:22 AM    LDL, calculated 77.6 05/02/2022 05:22 AM    Triglyceride 72 05/02/2022 05:22 AM    CHOL/HDL Ratio 2.3 05/02/2022 05:22 AM     Lab Results   Component Value Date/Time    Glucose  fasting 10/30/2014 09:12 AM    Glucose POC 04 fasting 10/30/2014 08:56 AM     Lab Results   Component Value Date/Time    Color YELLOW/STRAW 05/02/2022 05:22 AM    Appearance CLEAR 05/02/2022 05:22 AM    Specific gravity 1.010 05/02/2022 05:22 AM    pH (UA) 6.0 05/02/2022 05:22 AM    Protein Negative 05/02/2022 05:22 AM    Glucose Negative 05/02/2022 05:22 AM    Ketone TRACE (A) 05/02/2022 05:22 AM    Bilirubin Negative 05/02/2022 05:22 AM    Urobilinogen 1.0 05/02/2022 05:22 AM    Nitrites Positive (A) 05/02/2022 05:22 AM    Leukocyte Esterase SMALL (A) 05/02/2022 05:22 AM    Epithelial cells FEW 05/02/2022 05:22 AM    Bacteria 4+ (A) 05/02/2022 05:22 AM    WBC 10-20 05/02/2022 05:22 AM    RBC 0-5 05/02/2022 05:22 AM         Medications Reviewed:     Current Facility-Administered Medications   Medication Dose Route Frequency    haloperidol (HALDOL) 2 mg/mL oral solution 2 mg  2 mg SubLINGual Q6H PRN    dextrose 5% - 0.45% NaCl with KCl 20 mEq/L infusion  75 mL/hr IntraVENous CONTINUOUS    hydrALAZINE (APRESOLINE) 20 mg/mL injection 10 mg  10 mg IntraVENous Q6H PRN    metoprolol (LOPRESSOR) injection 2.5 mg  2.5 mg IntraVENous Q6H    losartan (COZAAR) tablet 50 mg  50 mg Oral DAILY    lidocaine 4 % patch 1 Patch  1 Patch TransDERmal Q24H    metoclopramide HCl (REGLAN) injection 5 mg  5 mg IntraVENous Q6H PRN    levETIRAcetam (KEPPRA) injection 500 mg  500 mg IntraVENous Q12H    labetaloL (NORMODYNE;TRANDATE) injection 10 mg  10 mg IntraVENous Q2H PRN    sodium chloride (NS) flush 5-40 mL  5-40 mL IntraVENous Q8H    sodium chloride (NS) flush 5-40 mL  5-40 mL IntraVENous PRN    acetaminophen (TYLENOL) tablet 650 mg  650 mg Oral Q6H PRN    Or    acetaminophen (TYLENOL) suppository 650 mg  650 mg Rectal Q6H PRN    polyethylene glycol (MIRALAX) packet 17 g  17 g Oral DAILY PRN    ondansetron (ZOFRAN ODT) tablet 4 mg  4 mg Oral Q8H PRN    Or    ondansetron (ZOFRAN) injection 4 mg  4 mg IntraVENous Q6H PRN     ______________________________________________________________________  EXPECTED LENGTH OF STAY: 2d 21h  ACTUAL LENGTH OF STAY:          6      Please note that this dictation was completed with [employer], the computer voice recognition software. Quite often unanticipated grammatical, syntax, homophones, and other interpretive errors are inadvertently transcribed by the computer software. Please disregard these errors. Please excuse any errors that have escaped final proofreading.                 Lenita Duverney, MD

## 2022-05-07 NOTE — PROGRESS NOTES
Problem: Pressure Injury - Risk of  Goal: *Prevention of pressure injury  Description: Document Eugene Scale and appropriate interventions in the flowsheet. Outcome: Progressing Towards Goal  Note: Pressure Injury Interventions:  Sensory Interventions: Minimize linen layers,Keep linens dry and wrinkle-free,Assess need for specialty bed,Turn and reposition approx.  every two hours (pillows and wedges if needed)    Moisture Interventions: Absorbent underpads,Minimize layers,Internal/External urinary devices    Activity Interventions: Pressure redistribution bed/mattress(bed type)    Mobility Interventions: Pressure redistribution bed/mattress (bed type),HOB 30 degrees or less    Nutrition Interventions: Document food/fluid/supplement intake    Friction and Shear Interventions: Apply protective barrier, creams and emollients,Minimize layers                Problem: Patient Education: Go to Patient Education Activity  Goal: Patient/Family Education  Outcome: Progressing Towards Goal     Problem: TIA/CVA Stroke: 0-24 hours  Goal: Off Pathway (Use only if patient is Off Pathway)  Outcome: Progressing Towards Goal  Goal: Activity/Safety  Outcome: Progressing Towards Goal  Goal: Consults, if ordered  Outcome: Progressing Towards Goal  Goal: Diagnostic Test/Procedures  Outcome: Progressing Towards Goal  Goal: Nutrition/Diet  Outcome: Progressing Towards Goal  Goal: Discharge Planning  Outcome: Progressing Towards Goal  Goal: Respiratory  Outcome: Progressing Towards Goal  Goal: Treatments/Interventions/Procedures  Outcome: Progressing Towards Goal  Goal: *Hemodynamically stable  Outcome: Progressing Towards Goal  Goal: *Neurologically stable  Description: Absence of additional neurological deficits    Outcome: Progressing Towards Goal  Goal: *Verbalizes anxiety and depression are reduced or absent  Outcome: Progressing Towards Goal  Goal: *Absence of Signs of Aspiration on Current Diet  Outcome: Progressing Towards Goal  Goal: *Absence of deep venous thrombosis signs and symptoms(Stroke Metric)  Outcome: Progressing Towards Goal  Goal: *Ability to perform ADLs and demonstrates progressive mobility and function  Outcome: Progressing Towards Goal  Goal: *Stroke education started(Stroke Metric)  Outcome: Progressing Towards Goal  Goal: *Dysphagia screen performed(Stroke Metric)  Outcome: Progressing Towards Goal  Goal: *Rehab consulted(Stroke Metric)  Outcome: Progressing Towards Goal     Problem: Ischemic Stroke: Discharge Outcomes  Goal: *Verbalizes anxiety and depression are reduced or absent  Outcome: Progressing Towards Goal  Goal: *Verbalize understanding of risk factor modification(Stroke Metric)  Outcome: Progressing Towards Goal  Goal: *Hemodynamically stable  Outcome: Progressing Towards Goal  Goal: *Absence of aspiration pneumonia  Outcome: Progressing Towards Goal  Goal: *Aware of needed dietary changes  Outcome: Progressing Towards Goal  Goal: *Verbalize understanding of prescribed medications including anti-coagulants, anti-lipid, and/or anti-platelets(Stroke Metric)  Outcome: Progressing Towards Goal  Goal: *Tolerating diet  Outcome: Progressing Towards Goal  Goal: *Aware of follow-up diagnostics related to anticoagulants  Outcome: Progressing Towards Goal  Goal: *Ability to perform ADLs and demonstrates progressive mobility and function  Outcome: Progressing Towards Goal  Goal: *Absence of DVT(Stroke Metric)  Outcome: Progressing Towards Goal  Goal: *Absence of aspiration  Outcome: Progressing Towards Goal  Goal: *Optimal pain control at patient's stated goal  Outcome: Progressing Towards Goal  Goal: *Home safety concerns addressed  Outcome: Progressing Towards Goal  Goal: *Describes available resources and support systems  Outcome: Progressing Towards Goal  Goal: *Verbalizes understanding of activation of EMS(911) for stroke symptoms(Stroke Metric)  Outcome: Progressing Towards Goal  Goal: *Understands and describes signs and symptoms to report to providers(Stroke Metric)  Outcome: Progressing Towards Goal  Goal: *Neurolgocially stable (absence of additional neurological deficits)  Outcome: Progressing Towards Goal  Goal: *Verbalizes importance of follow-up with primary care physician(Stroke Metric)  Outcome: Progressing Towards Goal  Goal: *Smoking cessation discussed,if applicable(Stroke Metric)  Outcome: Progressing Towards Goal  Goal: *Depression screening completed(Stroke Metric)  Outcome: Progressing Towards Goal

## 2022-05-07 NOTE — HOSPICE
This LMSW, along with Miky Nicole, RN Liaison, met with patient's daughter, Lindsay Roy, and her long term partner Feliberto Lemus. Meeting was conducted using translation services. For future appointments, please call Feliberto Lemus at [phone]. During the visit, the nurse explained Hospice to the family. She stated the patient will either need to go home or to the [facility]. Patient has a social security number, but her temporary visa has . She has no benefits. She lives in a trailer home with the daughter and three children. The family wishes for her to come home, but understands this may not be possible. The daughter works and there is no consistent caregiver available. They were encouraged to fill out the FAP and consider [facility] placement. Patient is , however, in a later conversation with the nurse, found that they had been  for about a year. No legal separation has been done. There is no advanced directive or DNR.  has pancreatic cancer and is living with his daughter (Patient's stepdaughter) Rabia Son. He recently was released from the hospital. The patient has five children, four of whom live in [place]. They wish for her to return to [place]. It was explained that transport for that right now may not be an option given her condition. Family stated they will fill out the FAP and return to  on call , Nichelle Helton. LMSW provided phone numbers. Prior to getting sick, the patient worked for a [employer] and did side jobs. She has 16 grandchildren and loved to cook and be with family. Her children in [place] have not seen her for 20 years when she came to the [address]. Children in [place] are unable to come to the [address]. Family, including  and step daughter, was scheduled to meet with the Miky Nicole later in the afternoon. Patient's daughter was tearful as she processed that her mom was at EOL.  Social work to follow-up on FAP, placement and  home.

## 2022-05-07 NOTE — PROGRESS NOTES
SLP Contact Note    Noted pt and family pursuing comfort measures. Do not want feeding tube, which is certainly reasonable. Recommend allowing pt any requested PO items but do not force feed. Will sign off.       Thank you,  DAIN Astudillo.Ed, [address] [facility]  Speech-Language Pathologist

## 2022-05-07 NOTE — PROGRESS NOTES
Bedside and Verbal shift change report given to Gadiel (oncoming nurse) by Ngozi Mendenhall RN (offgoing nurse). Report included the following information SBAR, Kardex, Procedure Summary, MAR, Recent Results, Cardiac Rhythm SR, Alarm Parameters  and Dual Neuro Assessment. Review and agreement of charting by Johnie ROJAS.

## 2022-05-07 NOTE — HOSPICE
[facility]  [phone]    Patient Name: Christo Gunderson  YOB: 1955  Age: 78 y.o. [facility] RN Note:  Hospice consult noted. Chart reviewed. Plan of care discussed with patients nurse & care manager. Jessie Hallman, EARNEST and liaison had info session with pt's dtr Mona Conner and her partner Cecil Suárez. He is point of contact: [phone]. Pt's  Andree Melton is not the father of patient's children. He is recently discharged from hospital and living with his daughter Abel Chau ([phone]). Through language line  we explained hospice philosophy, general plan of care, services and on call procedures. FAP form given to daughter as pt is currently on  visa. Has 4 children in [place] she has not seen since arriving in [address] 22 years ago. She and   15 years. It would be difficult for daughter to provide 24 hr care as she and partner have 3 children and live in a trailer. She works full-time. Currently patient is NPO and is receiving IV antiseizure prophylaxis. Will call and arrange visit with  when he is able to come to bedside as he is legal NOK. Family information packet provided. We will continue to assess this patient daily and work on getting financial assistance for hospice care. 14:45: Met with pt's  Jarvis So and his dtr Abel Chau in private area. Using language line  explained hospice philosophy, services, etc. Tatum Alves explained that patient and  were living with her and her family until one year ago when pt moved out to live with her daughter with no explanation or contact in the past year. The first they heard of her illness was when the hospital called them. There is no legal separation so   is still NOK. He agrees to hospice care. Consents are undated.  We will not move forward on hospice until Financial Assistance Application is received from pt's daughter. Thank you for the opportunity to be of service to this patient.     Kieran Holt, [facility]  [phone]

## 2022-05-07 NOTE — PROGRESS NOTES
Problem: Pressure Injury - Risk of  Goal: *Prevention of pressure injury  Description: Document Eugene Scale and appropriate interventions in the flowsheet. Outcome: Progressing Towards Goal  Note: Pressure Injury Interventions:  Sensory Interventions: Assess changes in LOC,Assess need for specialty bed,Avoid rigorous massage over bony prominences,Check visual cues for pain,Discuss PT/OT consult with provider,Float heels,Keep linens dry and wrinkle-free,Maintain/enhance activity level,Minimize linen layers,Monitor skin under medical devices,Sit a 90-degree angle/use footstool if needed,Turn and reposition approx. every two hours (pillows and wedges if needed),Use 30-degree side-lying position    Moisture Interventions: Absorbent underpads,Apply protective barrier, creams and emollients,Assess need for specialty bed,Check for incontinence Q2 hours and as needed,Internal/External fecal devices,Maintain skin hydration (lotion/cream),Minimize layers,Offer toileting Q_hr,Moisture barrier    Activity Interventions: Assess need for specialty bed,Increase time out of bed,PT/OT evaluation    Mobility Interventions: Assess need for specialty bed,Float heels,HOB 30 degrees or less,PT/OT evaluation,Turn and reposition approx.  every two hours(pillow and wedges)    Nutrition Interventions: Document food/fluid/supplement intake,Discuss nutritional consult with provider,Offer support with meals,snacks and hydration    Friction and Shear Interventions: Apply protective barrier, creams and emollients,Feet elevated on foot rest,HOB 30 degrees or less,Lift sheet,Lift team/patient mobility team,Minimize layers,Sit at 90-degree angle,Transferring/repositioning devices                Problem: Patient Education: Go to Patient Education Activity  Goal: Patient/Family Education  Outcome: Progressing Towards Goal     Problem: Patient Education: Go to Patient Education Activity  Goal: Patient/Family Education  Outcome: Progressing Towards Goal     Problem: Ischemic Stroke: Discharge Outcomes  Goal: *Verbalizes anxiety and depression are reduced or absent  Outcome: Progressing Towards Goal  Goal: *Verbalize understanding of risk factor modification(Stroke Metric)  Outcome: Progressing Towards Goal  Goal: *Hemodynamically stable  Outcome: Progressing Towards Goal  Goal: *Absence of aspiration pneumonia  Outcome: Progressing Towards Goal  Goal: *Aware of needed dietary changes  Outcome: Progressing Towards Goal  Goal: *Verbalize understanding of prescribed medications including anti-coagulants, anti-lipid, and/or anti-platelets(Stroke Metric)  Outcome: Progressing Towards Goal  Goal: *Tolerating diet  Outcome: Progressing Towards Goal  Goal: *Aware of follow-up diagnostics related to anticoagulants  Outcome: Progressing Towards Goal  Goal: *Ability to perform ADLs and demonstrates progressive mobility and function  Outcome: Progressing Towards Goal  Goal: *Absence of DVT(Stroke Metric)  Outcome: Progressing Towards Goal  Goal: *Absence of aspiration  Outcome: Progressing Towards Goal  Goal: *Optimal pain control at patient's stated goal  Outcome: Progressing Towards Goal  Goal: *Home safety concerns addressed  Outcome: Progressing Towards Goal  Goal: *Describes available resources and support systems  Outcome: Progressing Towards Goal  Goal: *Verbalizes understanding of activation of EMS(911) for stroke symptoms(Stroke Metric)  Outcome: Progressing Towards Goal  Goal: *Understands and describes signs and symptoms to report to providers(Stroke Metric)  Outcome: Progressing Towards Goal  Goal: *Neurolgocially stable (absence of additional neurological deficits)  Outcome: Progressing Towards Goal  Goal: *Verbalizes importance of follow-up with primary care physician(Stroke Metric)  Outcome: Progressing Towards Goal  Goal: *Smoking cessation discussed,if applicable(Stroke Metric)  Outcome: Progressing Towards Goal  Goal: *Depression screening completed(Stroke Metric)  Outcome: Progressing Towards Goal     Problem: Falls - Risk of  Goal: *Absence of Falls  Description: Document Destiny Campos Fall Risk and appropriate interventions in the flowsheet.   Outcome: Progressing Towards Goal  Note: Fall Risk Interventions:  Mobility Interventions: Assess mobility with egress test,Bed/chair exit alarm,Communicate number of staff needed for ambulation/transfer,OT consult for ADLs,Patient to call before getting OOB,PT Consult for mobility concerns,PT Consult for assist device competence,Strengthening exercises (ROM-active/passive),Utilize walker, cane, or other assistive device,Utilize gait belt for transfers/ambulation    Mentation Interventions: Adequate sleep, hydration, pain control,Bed/chair exit alarm,Door open when patient unattended,Evaluate medications/consider consulting pharmacy,Gait belt with transfers/ambulation,Increase mobility,More frequent rounding,Reorient patient,Room close to nurse's station,Toileting rounds,Update white board    Medication Interventions: Assess postural VS orthostatic hypotension,Bed/chair exit alarm,Evaluate medications/consider consulting pharmacy,Patient to call before getting OOB,Teach patient to arise slowly,Utilize gait belt for transfers/ambulation    Elimination Interventions: Bed/chair exit alarm,Call light in reach,Elevated toilet seat,Patient to call for help with toileting needs,Stay With Me (per policy),Toilet paper/wipes in reach,Toileting schedule/hourly rounds    History of Falls Interventions: Bed/chair exit alarm,Consult care management for discharge planning,Door open when patient unattended,Evaluate medications/consider consulting pharmacy,Room close to nurse's station,Utilize gait belt for transfer/ambulation,Assess for delayed presentation/identification of injury for 48 hrs (comment for end date),Vital signs minimum Q4HRs X 24 hrs (comment for end date)         Problem: Patient Education: Go to Patient Education Activity  Goal: Patient/Family Education  Outcome: Progressing Towards Goal     Problem: Nutrition Deficit  Goal: *Optimize nutritional status  Outcome: Progressing Towards Goal

## 2022-05-07 NOTE — PROGRESS NOTES
Bedside and Verbal shift change report given to TomRN (oncoming nurse) by Krista Huerta (offgoing nurse). Report included the following information SBAR, Kardex, Intake/Output, MAR, Recent Results, Cardiac Rhythm NSR/STach and Dual Neuro Assessment.

## 2022-05-08 LAB
BASOPHILS # BLD: 0.1 K/UL (ref 0–0.1)
BASOPHILS NFR BLD: 1 % (ref 0–1)
DIFFERENTIAL METHOD BLD: ABNORMAL
EOSINOPHIL # BLD: 0.3 K/UL (ref 0–0.4)
EOSINOPHIL NFR BLD: 4 % (ref 0–7)
ERYTHROCYTE [DISTWIDTH] IN BLOOD BY AUTOMATED COUNT: 13.1 % (ref 11.5–14.5)
HCT VFR BLD AUTO: 34.6 % (ref 35–47)
HGB BLD-MCNC: 11 G/DL (ref 11.5–16)
IMM GRANULOCYTES # BLD AUTO: 0 K/UL (ref 0–0.04)
IMM GRANULOCYTES NFR BLD AUTO: 0 % (ref 0–0.5)
LYMPHOCYTES # BLD: 1.6 K/UL (ref 0.8–3.5)
LYMPHOCYTES NFR BLD: 22 % (ref 12–49)
MCH RBC QN AUTO: 29.5 PG (ref 26–34)
MCHC RBC AUTO-ENTMCNC: 31.8 G/DL (ref 30–36.5)
MCV RBC AUTO: 92.8 FL (ref 80–99)
MONOCYTES # BLD: 0.6 K/UL (ref 0–1)
MONOCYTES NFR BLD: 8 % (ref 5–13)
NEUTS SEG # BLD: 4.5 K/UL (ref 1.8–8)
NEUTS SEG NFR BLD: 65 % (ref 32–75)
NRBC # BLD: 0 K/UL (ref 0–0.01)
NRBC BLD-RTO: 0 PER 100 WBC
PLATELET # BLD AUTO: 277 K/UL (ref 150–400)
PMV BLD AUTO: 10.9 FL (ref 8.9–12.9)
RBC # BLD AUTO: 3.73 M/UL (ref 3.8–5.2)
WBC # BLD AUTO: 7 K/UL (ref 3.6–11)

## 2022-05-08 PROCEDURE — 74011000250 HC RX REV CODE- 250: Performed by: HOSPITALIST

## 2022-05-08 PROCEDURE — 74011250636 HC RX REV CODE- 250/636: Performed by: HOSPITALIST

## 2022-05-08 PROCEDURE — 74011250636 HC RX REV CODE- 250/636: Performed by: PSYCHIATRY & NEUROLOGY

## 2022-05-08 PROCEDURE — 74011000250 HC RX REV CODE- 250: Performed by: NURSE PRACTITIONER

## 2022-05-08 PROCEDURE — 74011000250 HC RX REV CODE- 250: Performed by: INTERNAL MEDICINE

## 2022-05-08 PROCEDURE — 65270000046 HC RM TELEMETRY

## 2022-05-08 PROCEDURE — 36415 COLL VENOUS BLD VENIPUNCTURE: CPT

## 2022-05-08 PROCEDURE — 85025 COMPLETE CBC W/AUTO DIFF WBC: CPT

## 2022-05-08 RX ADMIN — SODIUM CHLORIDE, PRESERVATIVE FREE 10 ML: 5 INJECTION INTRAVENOUS at 06:24

## 2022-05-08 RX ADMIN — LEVETIRACETAM 500 MG: 100 INJECTION, SOLUTION, CONCENTRATE INTRAVENOUS at 02:20

## 2022-05-08 RX ADMIN — LEVETIRACETAM 500 MG: 100 INJECTION, SOLUTION, CONCENTRATE INTRAVENOUS at 14:36

## 2022-05-08 RX ADMIN — METOPROLOL TARTRATE 2.5 MG: 1 INJECTION, SOLUTION INTRAVENOUS at 18:03

## 2022-05-08 RX ADMIN — SODIUM CHLORIDE, PRESERVATIVE FREE 10 ML: 5 INJECTION INTRAVENOUS at 22:40

## 2022-05-08 RX ADMIN — POTASSIUM CHLORIDE, DEXTROSE MONOHYDRATE AND SODIUM CHLORIDE 75 ML/HR: 150; 5; 450 INJECTION, SOLUTION INTRAVENOUS at 12:12

## 2022-05-08 RX ADMIN — SODIUM CHLORIDE, PRESERVATIVE FREE 10 ML: 5 INJECTION INTRAVENOUS at 14:36

## 2022-05-08 RX ADMIN — METOPROLOL TARTRATE 2.5 MG: 1 INJECTION, SOLUTION INTRAVENOUS at 12:22

## 2022-05-08 NOTE — HOSPICE
[facility]  [facility] Those in Need  [phone]    Patient Name: Chadd Macdonald  YOB: 1955  Age: 78 y.o. [address] [facility] RN Note:      Call to pt's DENISE Coughlin to see if he had any questions about the FAP form. He states they have not started it yet but plan to be at bedside between 2:00-3:00 today. Will meet with them at that time. Pt has new order for comfort foods as tolerated. 15:00:  Family arrived to bedside. Pt awakened briefly and was able to swallow a couple of sips of thickened water. FAP form was filled out and emailed to EARNEST Hathaway for approval. Paper documentation will be emailed to her directly from [facility]. His email address is: [email or web address]. He restated that they live in a trailer home with three children ages 24, 23, and 15. Pt's dtr works full-time. Their address is: [place] [address]. During their visit the  came and offered to request the on-call Reba Murray to perform \"Sacrament of the Sick\". Daughter agreed. We will continue to support this patient and her family.     Uma Odell RN [facility]  [phone]  Or Jeanne

## 2022-05-08 NOTE — PROGRESS NOTES
Bedside and Verbal shift change report given to BOB Loja (oncoming nurse) by Nghia Anders (offgoing nurse). Report included the following information SBAR, Kardex, Intake/Output, MAR, Recent Results, Cardiac Rhythm NSR and Dual Neuro Assessment.

## 2022-05-08 NOTE — PROGRESS NOTES
Spiritual Care Assessment/Progress Note  ST. [address]      NAME: Aurea Moreira      MRN: [id number]  AGE: 78 y.o. SEX: female  Roman Catholic Affiliation: No Congregational   Language: Azerbaijani     5/8/2022     Total Time (in minutes): 12     Spiritual Assessment begun in [facility] Dagoberto Horn through conversation with:         []Patient        [x] Family    [] Friend(s)        Reason for Consult:  request     Spiritual beliefs: (Please include comment if needed)     [x] Identifies with a renetta tradition: Yazdanism        [] Supported by a renetta community:            [] Claims no spiritual orientation:           [] Seeking spiritual identity:                [] Adheres to an individual form of spirituality:           [] Not able to assess:                           Identified resources for coping:      [] Prayer                               [] Music                  [] Guided Imagery     [x] Family/friends                 [] Pet visits     [] Devotional reading                         [] Unknown     [x] Other: Sacramental Support                                              Interventions offered during this visit: (See comments for more details)          Family/Friend(s):  Affirmation of renetta,Catharsis/review of pertinent events in supportive environment,Initial Assessment,Anointing of the sick (other than Yazdanism)     Plan of Care:     [] Support spiritual and/or cultural needs    [] Support AMD and/or advance care planning process      [] Support grieving process   [] Coordinate Rites and/or Rituals    [] Coordination with community clergy   [] No spiritual needs identified at this time   [] Detailed Plan of Care below (See Comments)  [] Make referral to Music Therapy  [] Make referral to Pet Therapy     [] Make referral to Addiction services  [] Make referral to [facility]  [] Make referral to Spiritual Care Partner  [] No future visits requested        [x] Contact Spiritual Care for further referrals     Comments: Provided support for this pt in [facility]. Assisted pt with request for  visit for anointing of the sick. Contacted Fr. Mya Mosley who consented to provided anointing. Contact Spiritual Care Services for any emotional or spiritual support needs. Radha Chong MDiv.  Staff   Request  Support/Spiritual Care Services on [phone])

## 2022-05-08 NOTE — PROGRESS NOTES
[facility]  Hospitalist Group                                                                                          Hospitalist Progress Note  Karsten Hicks MD  Answering service: [phone] from in house phone        Date of Service:  2022  NAME:  Leilani Ashton  :  1955  MRN:  [id number]      Admission Summary:   Per H and P \"78year-old female with PMHx of schizophrenia who presents to the ED as a transfer from an OSH ED for right-sided mixed density right parietal parenchymal hemorrhage.  Per chart review, her symptoms began yesterday morning with left-sided hemiparesis.  Family brought her to the OSH today after she became more lethargic with AMS.  Neurosurgery evaluated patient and no need for neurosurgical intervention at this time. Kartik Chacon was started on Cardene in the ED for hypertension and Precedex for agitation.  OSH was unable to perform CTA due to agitation.  Will attempt CTA while on Precedex.  She will be admitted to the ICU for further medical management\"     Interval history / Subjective:   confused  Hospice met with family, needs FAP application done before hospice would admit to hospice house per notes       Assessment & Plan:     # Right parieto-occipital and right falcine hemorrhages  -. Right parieto-occipital and right falcine hemorrhages with associated edema  and mass effect and leftward midline shift are not significantly changed from  prior. There is new the evident layering intraventricular hemorrhage in the  posterior left lateral ventricular horn. Conservative management per NSGY  -neurology following  -seroquel changed to Haldol   -c/w Keppra for total 7 days, started on , stop after completing today      #Dysphagia: comfort feeding      #HTN: BP uncontrolled   Goal less than 140  -lopressor IV as patient NPO    # elevated WBC - ?  Aspiration   - check CXR     # acidosis  With gap  - resolved with bicarb gtt  - no labs this AM, transitioning to hospice, will defer labs  - c/w d5 1/2 NS with KCL today, change to d5 1/2 ns in AM      # UTI - off abx now      Code status: Partial family  want's home hospice , Partial code   DVT prophylaxis: SCD    Care Plan discussed with: Nurse  Anticipated Disposition: SNF/LTC  Anticipated Discharge: Greater than 48 hours     Hospital Problems  Date Reviewed: 6/9/2015          Codes Class Noted POA    Hemorrhagic stroke [facility]) ICD-10-CM: I61.9  ICD-9-CM: 907  5/1/2022 Unknown                Review of Systems:   Review of systems not obtained due to patient factors. Vital Signs:    Last 24hrs VS reviewed since prior progress note. Most recent are:  Visit Vitals  BP (!) 111/54 (BP 1 Location: Left upper arm, BP Patient Position: At rest)   Pulse 72   Temp 97.6 °F (36.4 °C)   Resp 17   Ht 4' 7\" (1.397 m)   Wt 53.3 kg (117 lb 8.1 oz)   SpO2 98%   BMI 27.31 kg/m²         Intake/Output Summary (Last 24 hours) at 5/8/2022 0930  Last data filed at 5/7/2022 1745  Gross per 24 hour   Intake    Output 200 ml   Net -200 ml        Physical Examination:     I had a face to face encounter with this patient and independently examined them on 5/8/2022 as outlined below:          General : alert x 0, awake, no acute distress,  Chest: Clear to auscultation bilaterally   CVS: S1 S2 heard, Capillary refill less than 2 seconds  Abd: soft/ Non tender, non distended, BS physiological,   Ext: no clubbing, no cyanosis, no edema, brisk 2+ DP pulses  Neuro/Psych: pleasant mood and affect  Skin: warm     Data Review:    I personally reviewed  Image and labs      Labs:     Recent Labs     05/07/22 0319 05/06/22 0222   WBC 8.2 10.9   HGB 10.8* 11.8   HCT 31.8* 34.7*    303     Recent Labs     05/06/22 0222      K 2.7*      CO2 22   BUN 7   CREA 0.48*   *   CA 8.9     No results for input(s): ALT, AP, TBIL, TBILI, TP, ALB, GLOB, GGT, AML, LPSE in the last 72 hours.     No lab exists for component: SGOT, GPT, AMYP, HLPSE  No results for input(s): INR, PTP, APTT, INREXT, INREXT in the last 72 hours. No results for input(s): FE, TIBC, PSAT, FERR in the last 72 hours. No results found for: FOL, RBCF   No results for input(s): PH, PCO2, PO2 in the last 72 hours. No results for input(s): CPK, CKNDX, TROIQ in the last 72 hours.     No lab exists for component: CPKMB  Lab Results   Component Value Date/Time    Cholesterol, total 161 05/02/2022 05:22 AM    HDL Cholesterol 69 05/02/2022 05:22 AM    LDL, calculated 77.6 05/02/2022 05:22 AM    Triglyceride 72 05/02/2022 05:22 AM    CHOL/HDL Ratio 2.3 05/02/2022 05:22 AM     Lab Results   Component Value Date/Time    Glucose  fasting 10/30/2014 09:12 AM    Glucose POC 04 fasting 10/30/2014 08:56 AM     Lab Results   Component Value Date/Time    Color YELLOW/STRAW 05/02/2022 05:22 AM    Appearance CLEAR 05/02/2022 05:22 AM    Specific gravity 1.010 05/02/2022 05:22 AM    pH (UA) 6.0 05/02/2022 05:22 AM    Protein Negative 05/02/2022 05:22 AM    Glucose Negative 05/02/2022 05:22 AM    Ketone TRACE (A) 05/02/2022 05:22 AM    Bilirubin Negative 05/02/2022 05:22 AM    Urobilinogen 1.0 05/02/2022 05:22 AM    Nitrites Positive (A) 05/02/2022 05:22 AM    Leukocyte Esterase SMALL (A) 05/02/2022 05:22 AM    Epithelial cells FEW 05/02/2022 05:22 AM    Bacteria 4+ (A) 05/02/2022 05:22 AM    WBC 10-20 05/02/2022 05:22 AM    RBC 0-5 05/02/2022 05:22 AM         Medications Reviewed:     Current Facility-Administered Medications   Medication Dose Route Frequency    haloperidol (HALDOL) 2 mg/mL oral solution 2 mg  2 mg SubLINGual Q6H PRN    dextrose 5% - 0.45% NaCl with KCl 20 mEq/L infusion  75 mL/hr IntraVENous CONTINUOUS    hydrALAZINE (APRESOLINE) 20 mg/mL injection 10 mg  10 mg IntraVENous Q6H PRN    metoprolol (LOPRESSOR) injection 2.5 mg  2.5 mg IntraVENous Q6H    lidocaine 4 % patch 1 Patch  1 Patch TransDERmal Q24H    metoclopramide HCl (REGLAN) injection 5 mg  5 mg IntraVENous Q6H PRN    levETIRAcetam (KEPPRA) injection 500 mg  500 mg IntraVENous Q12H    labetaloL (NORMODYNE;TRANDATE) injection 10 mg  10 mg IntraVENous Q2H PRN    sodium chloride (NS) flush 5-40 mL  5-40 mL IntraVENous Q8H    sodium chloride (NS) flush 5-40 mL  5-40 mL IntraVENous PRN    acetaminophen (TYLENOL) tablet 650 mg  650 mg Oral Q6H PRN    Or    acetaminophen (TYLENOL) suppository 650 mg  650 mg Rectal Q6H PRN    polyethylene glycol (MIRALAX) packet 17 g  17 g Oral DAILY PRN    ondansetron (ZOFRAN ODT) tablet 4 mg  4 mg Oral Q8H PRN    Or    ondansetron (ZOFRAN) injection 4 mg  4 mg IntraVENous Q6H PRN     ______________________________________________________________________  EXPECTED LENGTH OF STAY: 2d 21h  ACTUAL LENGTH OF STAY:          7      Please note that this dictation was completed with [employer], the computer voice recognition software. Quite often unanticipated grammatical, syntax, homophones, and other interpretive errors are inadvertently transcribed by the computer software. Please disregard these errors. Please excuse any errors that have escaped final proofreading.                 Tasha Penaloza MD

## 2022-05-09 PROCEDURE — 74011250636 HC RX REV CODE- 250/636: Performed by: HOSPITALIST

## 2022-05-09 PROCEDURE — 74011250636 HC RX REV CODE- 250/636: Performed by: PSYCHIATRY & NEUROLOGY

## 2022-05-09 PROCEDURE — 92610 EVALUATE SWALLOWING FUNCTION: CPT | Performed by: SPEECH-LANGUAGE PATHOLOGIST

## 2022-05-09 PROCEDURE — 74011000250 HC RX REV CODE- 250: Performed by: HOSPITALIST

## 2022-05-09 PROCEDURE — 65270000046 HC RM TELEMETRY

## 2022-05-09 PROCEDURE — 74011000250 HC RX REV CODE- 250: Performed by: INTERNAL MEDICINE

## 2022-05-09 PROCEDURE — 74011000250 HC RX REV CODE- 250: Performed by: NURSE PRACTITIONER

## 2022-05-09 RX ADMIN — SODIUM CHLORIDE, PRESERVATIVE FREE 10 ML: 5 INJECTION INTRAVENOUS at 21:15

## 2022-05-09 RX ADMIN — SODIUM CHLORIDE, PRESERVATIVE FREE 10 ML: 5 INJECTION INTRAVENOUS at 06:49

## 2022-05-09 RX ADMIN — METOPROLOL TARTRATE 2.5 MG: 1 INJECTION, SOLUTION INTRAVENOUS at 06:46

## 2022-05-09 RX ADMIN — METOPROLOL TARTRATE 2.5 MG: 1 INJECTION, SOLUTION INTRAVENOUS at 13:16

## 2022-05-09 RX ADMIN — METOPROLOL TARTRATE 2.5 MG: 1 INJECTION, SOLUTION INTRAVENOUS at 23:53

## 2022-05-09 RX ADMIN — METOPROLOL TARTRATE 2.5 MG: 1 INJECTION, SOLUTION INTRAVENOUS at 18:26

## 2022-05-09 RX ADMIN — METOPROLOL TARTRATE 2.5 MG: 1 INJECTION, SOLUTION INTRAVENOUS at 00:34

## 2022-05-09 RX ADMIN — POTASSIUM CHLORIDE, DEXTROSE MONOHYDRATE AND SODIUM CHLORIDE 75 ML/HR: 150; 5; 450 INJECTION, SOLUTION INTRAVENOUS at 16:52

## 2022-05-09 RX ADMIN — POTASSIUM CHLORIDE, DEXTROSE MONOHYDRATE AND SODIUM CHLORIDE 75 ML/HR: 150; 5; 450 INJECTION, SOLUTION INTRAVENOUS at 01:36

## 2022-05-09 RX ADMIN — SODIUM CHLORIDE, PRESERVATIVE FREE 10 ML: 5 INJECTION INTRAVENOUS at 14:00

## 2022-05-09 RX ADMIN — LEVETIRACETAM 500 MG: 100 INJECTION, SOLUTION, CONCENTRATE INTRAVENOUS at 01:36

## 2022-05-09 NOTE — PROGRESS NOTES
Transition of Care Plan: likely [facility] (will need financial assistance)  *patient is uninsured*     RUR: 12% low     PCP F/U:      Disposition: likely [facility]      Transportation: TBD: hospital to home.      Main Contact: Daughter: Mavis Crane: [phone]   Niece [address] Cheek [phone]   * needed for both contacts*  Son in [address] ([facility])     [id number]: [facility] has been working with family for financial aid application for patient to be admitted to hospice. However, received notification from [facility], [facility] that patient will need to be screened for medicaid before they can admit. [employer] has attempted to screen on 05/03-05/05. Aidan Antunez However unable to reach . Email sent back out to [phone] with family contact numbers to see if patient can be screened for medicaid. [phone]: Case escalated to  to followup on medicaid screening.      Vonda Brock RN, CRM

## 2022-05-09 NOTE — PROGRESS NOTES
Problem: Pressure Injury - Risk of  Goal: *Prevention of pressure injury  Description: Document Eugene Scale and appropriate interventions in the flowsheet.   Outcome: Progressing Towards Goal  Note: Pressure Injury Interventions:  Sensory Interventions: Assess changes in LOC,Check visual cues for pain,Discuss PT/OT consult with provider,Keep linens dry and wrinkle-free,Float heels    Moisture Interventions: Internal/External urinary devices,Absorbent underpads,Check for incontinence Q2 hours and as needed    Activity Interventions: Pressure redistribution bed/mattress(bed type),Increase time out of bed    Mobility Interventions: PT/OT evaluation    Nutrition Interventions: Document food/fluid/supplement intake,Discuss nutritional consult with provider    Friction and Shear Interventions: Apply protective barrier, creams and emollients,Lift sheet,Transferring/repositioning devices                Problem: Patient Education: Go to Patient Education Activity  Goal: Patient/Family Education  Outcome: Progressing Towards Goal     Problem: Patient Education: Go to Patient Education Activity  Goal: Patient/Family Education  Outcome: Progressing Towards Goal     Problem: Ischemic Stroke: Discharge Outcomes  Goal: *Verbalizes anxiety and depression are reduced or absent  Outcome: Progressing Towards Goal  Goal: *Verbalize understanding of risk factor modification(Stroke Metric)  Outcome: Progressing Towards Goal  Goal: *Hemodynamically stable  Outcome: Progressing Towards Goal  Goal: *Absence of aspiration pneumonia  Outcome: Progressing Towards Goal  Goal: *Aware of needed dietary changes  Outcome: Progressing Towards Goal  Goal: *Verbalize understanding of prescribed medications including anti-coagulants, anti-lipid, and/or anti-platelets(Stroke Metric)  Outcome: Progressing Towards Goal  Goal: *Tolerating diet  Outcome: Progressing Towards Goal  Goal: *Aware of follow-up diagnostics related to anticoagulants  Outcome: Progressing Towards Goal  Goal: *Ability to perform ADLs and demonstrates progressive mobility and function  Outcome: Progressing Towards Goal  Goal: *Absence of DVT(Stroke Metric)  Outcome: Progressing Towards Goal  Goal: *Absence of aspiration  Outcome: Progressing Towards Goal  Goal: *Optimal pain control at patient's stated goal  Outcome: Progressing Towards Goal  Goal: *Home safety concerns addressed  Outcome: Progressing Towards Goal  Goal: *Describes available resources and support systems  Outcome: Progressing Towards Goal  Goal: *Verbalizes understanding of activation of EMS(911) for stroke symptoms(Stroke Metric)  Outcome: Progressing Towards Goal  Goal: *Understands and describes signs and symptoms to report to providers(Stroke Metric)  Outcome: Progressing Towards Goal  Goal: *Neurolgocially stable (absence of additional neurological deficits)  Outcome: Progressing Towards Goal  Goal: *Verbalizes importance of follow-up with primary care physician(Stroke Metric)  Outcome: Progressing Towards Goal  Goal: *Smoking cessation discussed,if applicable(Stroke Metric)  Outcome: Progressing Towards Goal  Goal: *Depression screening completed(Stroke Metric)  Outcome: Progressing Towards Goal     Problem: Patient Education: Go to Patient Education Activity  Goal: Patient/Family Education  Outcome: Progressing Towards Goal     Problem: Falls - Risk of  Goal: *Absence of Falls  Description: Document Lc Fall Risk and appropriate interventions in the flowsheet.   Outcome: Progressing Towards Goal  Note: Fall Risk Interventions:  Mobility Interventions: Communicate number of staff needed for ambulation/transfer,Bed/chair exit alarm,Patient to call before getting OOB,PT Consult for mobility concerns    Mentation Interventions: Adequate sleep, hydration, pain control,Increase mobility    Medication Interventions: Patient to call before getting OOB    Elimination Interventions: Patient to call for help with toileting needs,Toileting schedule/hourly rounds,Call light in reach    History of Falls Interventions: Bed/chair exit alarm,Investigate reason for fall         Problem: Patient Education: Go to Patient Education Activity  Goal: Patient/Family Education  Outcome: Progressing Towards Goal     Problem: Nutrition Deficit  Goal: *Optimize nutritional status  Outcome: Progressing Towards Goal

## 2022-05-09 NOTE — PROGRESS NOTES
Problem: Pressure Injury - Risk of  Goal: *Prevention of pressure injury  Description: Document Eugene Scale and appropriate interventions in the flowsheet. Outcome: Progressing Towards Goal  Note: Pressure Injury Interventions:  Sensory Interventions: Assess changes in LOC,Assess need for specialty bed,Avoid rigorous massage over bony prominences,Check visual cues for pain,Float heels,Keep linens dry and wrinkle-free,Maintain/enhance activity level,Minimize linen layers,Monitor skin under medical devices,Sit a 90-degree angle/use footstool if needed,Turn and reposition approx. every two hours (pillows and wedges if needed),Use 30-degree side-lying position    Moisture Interventions: Absorbent underpads,Apply protective barrier, creams and emollients,Assess need for specialty bed,Check for incontinence Q2 hours and as needed,Internal/External urinary devices,Maintain skin hydration (lotion/cream),Offer toileting Q_hr,Moisture barrier,Minimize layers    Activity Interventions: Assess need for specialty bed,Increase time out of bed    Mobility Interventions: Assess need for specialty bed,Float heels,HOB 30 degrees or less,Turn and reposition approx.  every two hours(pillow and wedges)    Nutrition Interventions: Document food/fluid/supplement intake,Discuss nutritional consult with provider,Offer support with meals,snacks and hydration    Friction and Shear Interventions: Apply protective barrier, creams and emollients,Feet elevated on foot rest,HOB 30 degrees or less,Lift sheet,Lift team/patient mobility team,Minimize layers,Sit at 90-degree angle,Transferring/repositioning devices                Problem: Patient Education: Go to Patient Education Activity  Goal: Patient/Family Education  Outcome: Progressing Towards Goal     Problem: Ischemic Stroke: Discharge Outcomes  Goal: *Verbalizes anxiety and depression are reduced or absent  Outcome: Progressing Towards Goal  Goal: *Verbalize understanding of risk factor modification(Stroke Metric)  Outcome: Progressing Towards Goal  Goal: *Hemodynamically stable  Outcome: Progressing Towards Goal  Goal: *Absence of aspiration pneumonia  Outcome: Progressing Towards Goal  Goal: *Aware of needed dietary changes  Outcome: Progressing Towards Goal  Goal: *Verbalize understanding of prescribed medications including anti-coagulants, anti-lipid, and/or anti-platelets(Stroke Metric)  Outcome: Progressing Towards Goal  Goal: *Tolerating diet  Outcome: Progressing Towards Goal  Goal: *Aware of follow-up diagnostics related to anticoagulants  Outcome: Progressing Towards Goal  Goal: *Ability to perform ADLs and demonstrates progressive mobility and function  Outcome: Progressing Towards Goal  Goal: *Absence of DVT(Stroke Metric)  Outcome: Progressing Towards Goal  Goal: *Absence of aspiration  Outcome: Progressing Towards Goal  Goal: *Optimal pain control at patient's stated goal  Outcome: Progressing Towards Goal  Goal: *Home safety concerns addressed  Outcome: Progressing Towards Goal  Goal: *Describes available resources and support systems  Outcome: Progressing Towards Goal  Goal: *Verbalizes understanding of activation of EMS(911) for stroke symptoms(Stroke Metric)  Outcome: Progressing Towards Goal  Goal: *Understands and describes signs and symptoms to report to providers(Stroke Metric)  Outcome: Progressing Towards Goal  Goal: *Neurolgocially stable (absence of additional neurological deficits)  Outcome: Progressing Towards Goal  Goal: *Verbalizes importance of follow-up with primary care physician(Stroke Metric)  Outcome: Progressing Towards Goal  Goal: *Smoking cessation discussed,if applicable(Stroke Metric)  Outcome: Progressing Towards Goal  Goal: *Depression screening completed(Stroke Metric)  Outcome: Progressing Towards Goal     Problem: Patient Education: Go to Patient Education Activity  Goal: Patient/Family Education  Outcome: Progressing Towards Goal     Problem: Falls - Risk of  Goal: *Absence of Falls  Description: Document Shannon Mcburney Fall Risk and appropriate interventions in the flowsheet.   Outcome: Progressing Towards Goal  Note: Fall Risk Interventions:  Mobility Interventions: Assess mobility with egress test,Bed/chair exit alarm,Communicate number of staff needed for ambulation/transfer,OT consult for ADLs,Patient to call before getting OOB,PT Consult for mobility concerns,PT Consult for assist device competence,Strengthening exercises (ROM-active/passive),Utilize walker, cane, or other assistive device,Utilize gait belt for transfers/ambulation    Mentation Interventions: Adequate sleep, hydration, pain control,Bed/chair exit alarm,Door open when patient unattended,Evaluate medications/consider consulting pharmacy,Familiar objects from home,Gait belt with transfers/ambulation,Increase mobility,More frequent rounding,Reorient patient,Room close to nurse's station,Update white board,Toileting rounds    Medication Interventions: Assess postural VS orthostatic hypotension,Bed/chair exit alarm,Evaluate medications/consider consulting pharmacy,Patient to call before getting OOB,Teach patient to arise slowly,Utilize gait belt for transfers/ambulation    Elimination Interventions: Bed/chair exit alarm,Call light in reach,Patient to call for help with toileting needs,Stay With Me (per policy),Toilet paper/wipes in reach,Toileting schedule/hourly rounds    History of Falls Interventions: Bed/chair exit alarm,Consult care management for discharge planning,Door open when patient unattended,Evaluate medications/consider consulting pharmacy,Room close to nurse's station,Utilize gait belt for transfer/ambulation,Assess for delayed presentation/identification of injury for 48 hrs (comment for end date),Vital signs minimum Q4HRs X 24 hrs (comment for end date)         Problem: Patient Education: Go to Patient Education Activity  Goal: Patient/Family Education  Outcome: Progressing Towards Goal     Problem: Nutrition Deficit  Goal: *Optimize nutritional status  Outcome: Progressing Towards Goal

## 2022-05-09 NOTE — HOSPICE
Hospice Liaison Note:    Chart reviewed for updates in plan of care. Plan: Continue to provide Hospice support and education. Please call Hospice team to offer support for patient, family or staff. Thank you for your coordination with the hospice plan of care    12:00: Bedside visit with Hospice social worker, Celeste Dye LCSW. Pt awake, acknowledges hospice team. Pt does not appear to be having distress. Appreciative when nurse turned off over head light. Bedside nurse is off the floor. Plan to round later this afternoon.       Gee Fang RN, [place] [id number] Nurse Liaison  [phone] Mobile  [phone] Office

## 2022-05-09 NOTE — PROGRESS NOTES
[address] visit. Mrs. Alba Maria declined communion. Prayer offered.     MATT Gonzalez, RN, ACSW, LCSW   Page:  [phone]

## 2022-05-09 NOTE — PROGRESS NOTES
Bedside shift change report given to BOB Courtney (oncoming nurse) by Etta Matamoros RN (offgoing nurse). Report included the following information SBAR, Kardex, Procedure Summary, Intake/Output, MAR, Recent Results and Dual Neuro Assessment.

## 2022-05-09 NOTE — PROGRESS NOTES
[facility]  Hospitalist Group                                                                                          Hospitalist Progress Note  Lyna Fothergill, MD  Answering service: [phone] OR [phone] from in house phone        Date of Service:  2022  NAME:  Willow Latif  :  1955  MRN:  [id number]      Admission Summary:   Per H and P \"78year-old female with PMHx of schizophrenia who presents to the ED as a transfer from an OSH ED for right-sided mixed density right parietal parenchymal hemorrhage.  Per chart review, her symptoms began yesterday morning with left-sided hemiparesis.  Family brought her to the OSH today after she became more lethargic with AMS.  Neurosurgery evaluated patient and no need for neurosurgical intervention at this time. Marry Eid was started on Cardene in the ED for hypertension and Precedex for agitation.  OSH was unable to perform CTA due to agitation.  Will attempt CTA while on Precedex.  She will be admitted to the ICU for further medical management\"     Interval history / Subjective:   Awake, talking in [address] [place] [address] left hand some but not the arm, no movement in left leg      Assessment & Plan:     # Right parieto-occipital and right falcine hemorrhages  -. Right parieto-occipital and right falcine hemorrhages with associated edema  and mass effect and leftward midline shift are not significantly changed from  prior. There is new the evident layering intraventricular hemorrhage in the  posterior left lateral ventricular horn. Conservative management per NSGY  -neurology following  -seroquel changed to Haldol   -c/w Keppra for total 7 days, started on , stop after completing today      #Dysphagia: comfort feeding      #HTN: BP uncontrolled   Goal less than 140  -lopressor IV as patient NPO    # elevated WBC - ?  Aspiration   - check CXR     # acidosis  With gap  - resolved with bicarb gtt  - no labs this AM, transitioning to hospice, will defer labs  - c/w d5 1/2 NS with KCL today, change to d5 1/2 ns in AM      # UTI - off abx now      Code status: Partial family  want's home hospice , Partial code   DVT prophylaxis: SCD    Care Plan discussed with: Nurse  Anticipated Disposition: SNF/LTC  Anticipated Discharge: Greater than 48 hours     Hospital Problems  Date Reviewed: 6/9/2015          Codes Class Noted POA    Hemorrhagic stroke [facility]) ICD-10-CM: I61.9  ICD-9-CM: 363  5/1/2022 Unknown                Review of Systems:   Review of systems not obtained due to patient factors. Vital Signs:    Last 24hrs VS reviewed since prior progress note. Most recent are:  Visit Vitals  /72 (BP 1 Location: Left upper arm, BP Patient Position: At rest)   Pulse 84   Temp 98.4 °F (36.9 °C)   Resp 22   Ht 4' 7\" (1.397 m)   Wt 55.8 kg (123 lb 0.3 oz)   SpO2 97%   BMI 28.59 kg/m²         Intake/Output Summary (Last 24 hours) at 5/9/2022 7836  Last data filed at 5/9/2022 3917  Gross per 24 hour   Intake    Output 1250 ml   Net -1250 ml        Physical Examination:     I had a face to face encounter with this patient and independently examined them on 5/9/2022 as outlined below:          General : alert x 0, awake, no acute distress,  Chest: Clear to auscultation bilaterally   CVS: S1 S2 heard, Capillary refill less than 2 seconds  Abd: soft/ Non tender, non distended, BS physiological,   Ext: no clubbing, no cyanosis, no edema, brisk 2+ DP pulses  Neuro/Psych: pleasant mood and affect, left LE flaccid, LUE: +, otherwise flaccid   Skin: warm     Data Review:    I personally reviewed  Image and labs      Labs:     Recent Labs     05/08/22  1215 05/07/22  0319   WBC 7.0 8.2   HGB 11.0* 10.8*   HCT 34.6* 31.8*    269     No results for input(s): NA, K, CL, CO2, BUN, CREA, GLU, CA, MG, PHOS, URICA in the last 72 hours. No results for input(s): ALT, AP, TBIL, TBILI, TP, ALB, GLOB, GGT, AML, LPSE in the last 72 hours.     No lab exists for component: SGOT, GPT, AMYP, HLPSE  No results for input(s): INR, PTP, APTT, INREXT, INREXT in the last 72 hours. No results for input(s): FE, TIBC, PSAT, FERR in the last 72 hours. No results found for: FOL, RBCF   No results for input(s): PH, PCO2, PO2 in the last 72 hours. No results for input(s): CPK, CKNDX, TROIQ in the last 72 hours.     No lab exists for component: CPKMB  Lab Results   Component Value Date/Time    Cholesterol, total 161 05/02/2022 05:22 AM    HDL Cholesterol 69 05/02/2022 05:22 AM    LDL, calculated 77.6 05/02/2022 05:22 AM    Triglyceride 72 05/02/2022 05:22 AM    CHOL/HDL Ratio 2.3 05/02/2022 05:22 AM     Lab Results   Component Value Date/Time    Glucose  fasting 10/30/2014 09:12 AM    Glucose POC 04 fasting 10/30/2014 08:56 AM     Lab Results   Component Value Date/Time    Color YELLOW/STRAW 05/02/2022 05:22 AM    Appearance CLEAR 05/02/2022 05:22 AM    Specific gravity 1.010 05/02/2022 05:22 AM    pH (UA) 6.0 05/02/2022 05:22 AM    Protein Negative 05/02/2022 05:22 AM    Glucose Negative 05/02/2022 05:22 AM    Ketone TRACE (A) 05/02/2022 05:22 AM    Bilirubin Negative 05/02/2022 05:22 AM    Urobilinogen 1.0 05/02/2022 05:22 AM    Nitrites Positive (A) 05/02/2022 05:22 AM    Leukocyte Esterase SMALL (A) 05/02/2022 05:22 AM    Epithelial cells FEW 05/02/2022 05:22 AM    Bacteria 4+ (A) 05/02/2022 05:22 AM    WBC 10-20 05/02/2022 05:22 AM    RBC 0-5 05/02/2022 05:22 AM         Medications Reviewed:     Current Facility-Administered Medications   Medication Dose Route Frequency    haloperidol (HALDOL) 2 mg/mL oral solution 2 mg  2 mg SubLINGual Q6H PRN    dextrose 5% - 0.45% NaCl with KCl 20 mEq/L infusion  75 mL/hr IntraVENous CONTINUOUS    hydrALAZINE (APRESOLINE) 20 mg/mL injection 10 mg  10 mg IntraVENous Q6H PRN    metoprolol (LOPRESSOR) injection 2.5 mg  2.5 mg IntraVENous Q6H    lidocaine 4 % patch 1 Patch  1 Patch TransDERmal Q24H    metoclopramide HCl (REGLAN) injection 5 mg  5 mg IntraVENous Q6H PRN    labetaloL (NORMODYNE;TRANDATE) injection 10 mg  10 mg IntraVENous Q2H PRN    sodium chloride (NS) flush 5-40 mL  5-40 mL IntraVENous Q8H    sodium chloride (NS) flush 5-40 mL  5-40 mL IntraVENous PRN    acetaminophen (TYLENOL) tablet 650 mg  650 mg Oral Q6H PRN    Or    acetaminophen (TYLENOL) suppository 650 mg  650 mg Rectal Q6H PRN    polyethylene glycol (MIRALAX) packet 17 g  17 g Oral DAILY PRN    ondansetron (ZOFRAN ODT) tablet 4 mg  4 mg Oral Q8H PRN    Or    ondansetron (ZOFRAN) injection 4 mg  4 mg IntraVENous Q6H PRN     ______________________________________________________________________  EXPECTED LENGTH OF STAY: 2d 21h  ACTUAL LENGTH OF STAY:          8           Tasha Penaloza MD

## 2022-05-09 NOTE — PROGRESS NOTES
Problem: Dysphagia (Adult)  Goal: *Acute Goals and Plan of Care (Insert Text)  Description: Speech Therapy Goals  Initiated 5/9/2022  1. Patient will tolerate soft and bite sized diet, thin liquids without overt s/s aspiration within 7 days. Outcome: Not Met    SPEECH LANGUAGE PATHOLOGY BEDSIDE SWALLOW EVALUATION  Patient: Kimberly Gross (79 y.o. female)  Date: 5/9/2022  Primary Diagnosis: Hemorrhagic stroke [facility]) [I61.9]        Precautions: Aspiration       ASSESSMENT :  Based on the objective data described below, the patient presents with mild oral dysphagia characterized by inability to bite solid cracker and mildly prolonged and incomplete mastication of solids. Patient tolerated ice chips, thin liquids, purees and solids without overt s/s aspiration. Risk of aspiration is elevated given right parietal parenchymal hemorrhage. Given bedside presentation feel patient is safe for cautious initiation of minced and moist diet, with thin liquids using swallow precautions below. Patient will benefit from skilled intervention to address the above impairments. Patients rehabilitation potential is considered to be Fair     PLAN :  Recommendations and Planned Interventions:  Minced and moist diet  Thin liquids  Sit up for all meals  Small bites  Single sips  Medication whole or crushed in puree  Frequency/Duration: Patient will be followed by speech-language pathology 3 times a week to address goals. Discharge Recommendations: To Be Determined     SUBJECTIVE:   Patient stated Dang Longoria. . Patient agreed to PO trials for assessment. Currently patient may have PO for comfort with plan for hospice. MD requests SLP re-assess swallow function given improved alertness.     OBJECTIVE:     Past Medical History:   Diagnosis Date    Psychotic disorder     concern for schizoaffective disorder     Past Surgical History:   Procedure Laterality Date    HX TUBAL LIGATION       Prior Level of Function/Home Situation: Home Situation  Support Systems: Child(justin) (Daughter: Rojelio Gaxiola)  Patient Expects to be Discharged to[de-identified] Home with hospice  Diet prior to admission: Unknown  Current Diet:  NPO   Cognitive and Communication Status:  Neurologic State: Alert  Orientation Level: Oriented to person  Cognition: Decreased attention/concentration,Decreased command following           Oral Assessment:  Oral Assessment  Labial: No impairment  Dentition: Edentulous  Oral Hygiene: moist oral mucosa, whilte coating on tongue  Velum: Unable to visualize  Mandible: No impairment  P.O. Trials:  Patient Position: Upright in bed  Vocal quality prior to P.O.: No impairment  Consistency Presented: Ice chips;Puree; Solid; Thin liquid  How Presented: Self-fed/presented;Spoon;Straw     Bolus Acceptance: Impaired  Bolus Formation/Control: Impaired  Type of Impairment: Delayed; Incomplete;Mastication  Propulsion: No impairment  Oral Residue: None  Initiation of Swallow: No impairment  Laryngeal Elevation: Functional  Aspiration Signs/Symptoms: None                Oral Phase Severity: Mild  Pharyngeal Phase Severity : No impairment    NOMS:   The NOMS functional outcome measure was used to quantify this patient's level of swallowing impairment. Based on the NOMS, the patient was determined to be at level 4 for swallow function       [facility]:  Level 1 (CN): NPO  Level 2 (CM): NPO but takes consistency in therapy  Level 3 (CL): Takes less than 50% of nutrition p.o. and continues with nonoral feedings; and/or safe with mod cues; and/or max diet restriction  Level 4 (CK): Safe swallow but needs mod cues; and/or mod diet restriction; and/or still requires some nonoral feeding/supplements  Level 5 (CJ): Safe swallow with min diet restriction; and/or needs min cues  Level 6 (CI): Independent with p.o.; rare cues; usually self cues; may need to avoid some foods or needs extra time  Level 7 ([address] [place] [address]): Independent for all p.o.  JAILENE. (2003).  National Outcomes Measurement System (NOMS): Adult Speech-Language Pathology User's Guide. After treatment:   Patient left in no apparent distress in bed, Call bell within reach, and Nursing notified    COMMUNICATION/EDUCATION:   Patient was educated regarding role of SLP and diet. Patient nodded. The patient's plan of care including recommendations, planned interventions, and recommended diet changes were discussed with: Registered nurse. Patient/family have participated as able in goal setting and plan of care.     Thank you for this referral.  ANJANA Bolden  Time Calculation: 15 mins

## 2022-05-09 NOTE — PROGRESS NOTES
[address], [facility] [address]  [phone]        Chart was reviewed. Per hospitalist's note, plan for hospice. We will sign off. Please call us with any questions. Thank you.        RUPERT Collins

## 2022-05-09 NOTE — ROUTINE PROCESS
Bedside and Verbal shift change report given to Yolanda Diego RN (oncoming nurse) by Mina Ozuna RN (offgoing nurse). Report included the following information SBAR, Kardex, MAR, Cardiac Rhythm NSR and Dual Neuro Assessment.

## 2022-05-10 LAB
BASOPHILS # BLD: 0.1 K/UL (ref 0–0.1)
BASOPHILS NFR BLD: 1 % (ref 0–1)
COMMENT, HOLDF: NORMAL
DIFFERENTIAL METHOD BLD: ABNORMAL
EOSINOPHIL # BLD: 0.3 K/UL (ref 0–0.4)
EOSINOPHIL NFR BLD: 4 % (ref 0–7)
ERYTHROCYTE [DISTWIDTH] IN BLOOD BY AUTOMATED COUNT: 13 % (ref 11.5–14.5)
HCT VFR BLD AUTO: 34.8 % (ref 35–47)
HGB BLD-MCNC: 11.6 G/DL (ref 11.5–16)
IMM GRANULOCYTES # BLD AUTO: 0 K/UL (ref 0–0.04)
IMM GRANULOCYTES NFR BLD AUTO: 0 % (ref 0–0.5)
LYMPHOCYTES # BLD: 1.5 K/UL (ref 0.8–3.5)
LYMPHOCYTES NFR BLD: 18 % (ref 12–49)
MCH RBC QN AUTO: 30.2 PG (ref 26–34)
MCHC RBC AUTO-ENTMCNC: 33.3 G/DL (ref 30–36.5)
MCV RBC AUTO: 90.6 FL (ref 80–99)
MONOCYTES # BLD: 0.7 K/UL (ref 0–1)
MONOCYTES NFR BLD: 8 % (ref 5–13)
NEUTS SEG # BLD: 5.8 K/UL (ref 1.8–8)
NEUTS SEG NFR BLD: 69 % (ref 32–75)
NRBC # BLD: 0 K/UL (ref 0–0.01)
NRBC BLD-RTO: 0 PER 100 WBC
PLATELET # BLD AUTO: 338 K/UL (ref 150–400)
PMV BLD AUTO: 10.6 FL (ref 8.9–12.9)
RBC # BLD AUTO: 3.84 M/UL (ref 3.8–5.2)
SAMPLES BEING HELD,HOLD: NORMAL
WBC # BLD AUTO: 8.4 K/UL (ref 3.6–11)

## 2022-05-10 PROCEDURE — 74011000250 HC RX REV CODE- 250: Performed by: INTERNAL MEDICINE

## 2022-05-10 PROCEDURE — 74011250636 HC RX REV CODE- 250/636: Performed by: HOSPITALIST

## 2022-05-10 PROCEDURE — 74011250637 HC RX REV CODE- 250/637: Performed by: NURSE PRACTITIONER

## 2022-05-10 PROCEDURE — 74011000250 HC RX REV CODE- 250: Performed by: NURSE PRACTITIONER

## 2022-05-10 PROCEDURE — 36415 COLL VENOUS BLD VENIPUNCTURE: CPT

## 2022-05-10 PROCEDURE — 85025 COMPLETE CBC W/AUTO DIFF WBC: CPT

## 2022-05-10 PROCEDURE — 65270000029 HC RM PRIVATE

## 2022-05-10 PROCEDURE — 92526 ORAL FUNCTION THERAPY: CPT

## 2022-05-10 PROCEDURE — 74011000250 HC RX REV CODE- 250: Performed by: HOSPITALIST

## 2022-05-10 PROCEDURE — 74011250637 HC RX REV CODE- 250/637: Performed by: INTERNAL MEDICINE

## 2022-05-10 PROCEDURE — 74011250637 HC RX REV CODE- 250/637: Performed by: HOSPITALIST

## 2022-05-10 RX ORDER — LANOLIN ALCOHOL/MO/W.PET/CERES
3 CREAM (GRAM) TOPICAL
Status: DISCONTINUED | OUTPATIENT
Start: 2022-05-10 | End: 2022-05-17 | Stop reason: HOSPADM

## 2022-05-10 RX ORDER — HYDRALAZINE HYDROCHLORIDE 20 MG/ML
10 INJECTION INTRAMUSCULAR; INTRAVENOUS
Status: DISCONTINUED | OUTPATIENT
Start: 2022-05-10 | End: 2022-05-17 | Stop reason: HOSPADM

## 2022-05-10 RX ADMIN — ACETAMINOPHEN 650 MG: 650 SUPPOSITORY RECTAL at 13:11

## 2022-05-10 RX ADMIN — SODIUM CHLORIDE, PRESERVATIVE FREE 10 ML: 5 INJECTION INTRAVENOUS at 06:02

## 2022-05-10 RX ADMIN — HALOPERIDOL 2 MG: 2 SOLUTION ORAL at 18:54

## 2022-05-10 RX ADMIN — SODIUM CHLORIDE, PRESERVATIVE FREE 10 ML: 5 INJECTION INTRAVENOUS at 21:24

## 2022-05-10 RX ADMIN — METOPROLOL TARTRATE 2.5 MG: 1 INJECTION, SOLUTION INTRAVENOUS at 13:11

## 2022-05-10 RX ADMIN — METOPROLOL TARTRATE 2.5 MG: 1 INJECTION, SOLUTION INTRAVENOUS at 06:02

## 2022-05-10 RX ADMIN — POTASSIUM CHLORIDE, DEXTROSE MONOHYDRATE AND SODIUM CHLORIDE 75 ML/HR: 150; 5; 450 INJECTION, SOLUTION INTRAVENOUS at 15:13

## 2022-05-10 RX ADMIN — Medication 3 MG: at 22:05

## 2022-05-10 RX ADMIN — METOPROLOL TARTRATE 2.5 MG: 1 INJECTION, SOLUTION INTRAVENOUS at 17:52

## 2022-05-10 RX ADMIN — SODIUM CHLORIDE, PRESERVATIVE FREE 10 ML: 5 INJECTION INTRAVENOUS at 13:19

## 2022-05-10 NOTE — PROGRESS NOTES
Problem: Pressure Injury - Risk of  Goal: *Prevention of pressure injury  Description: Document Eugene Scale and appropriate interventions in the flowsheet. Outcome: Progressing Towards Goal  Note: Pressure Injury Interventions:  Sensory Interventions: Assess changes in LOC,Assess need for specialty bed,Avoid rigorous massage over bony prominences,Check visual cues for pain,Discuss PT/OT consult with provider,Float heels,Keep linens dry and wrinkle-free,Maintain/enhance activity level,Minimize linen layers,Monitor skin under medical devices,Pad between skin to skin,Pressure redistribution bed/mattress (bed type),Sit a 90-degree angle/use footstool if needed,Turn and reposition approx. every two hours (pillows and wedges if needed),Use 30-degree side-lying position    Moisture Interventions: Absorbent underpads,Apply protective barrier, creams and emollients,Assess need for specialty bed,Check for incontinence Q2 hours and as needed,Internal/External urinary devices,Maintain skin hydration (lotion/cream),Minimize layers,Moisture barrier,Offer toileting Q_hr    Activity Interventions: Assess need for specialty bed,Increase time out of bed,Pressure redistribution bed/mattress(bed type),PT/OT evaluation    Mobility Interventions: Assess need for specialty bed,Float heels,HOB 30 degrees or less,Pressure redistribution bed/mattress (bed type),PT/OT evaluation,Turn and reposition approx.  every two hours(pillow and wedges)    Nutrition Interventions: Document food/fluid/supplement intake,Discuss nutritional consult with provider,Offer support with meals,snacks and hydration    Friction and Shear Interventions: Apply protective barrier, creams and emollients,Feet elevated on foot rest,Foam dressings/transparent film/skin sealants,HOB 30 degrees or less,Lift sheet,Lift team/patient mobility team,Minimize layers,Sit at 90-degree angle,Transferring/repositioning devices,Transfer aides:transfer board/Maria E lift/ceiling lift                Problem: Patient Education: Go to Patient Education Activity  Goal: Patient/Family Education  Outcome: Progressing Towards Goal     Problem: Patient Education: Go to Patient Education Activity  Goal: Patient/Family Education  Outcome: Progressing Towards Goal     Problem: Ischemic Stroke: Discharge Outcomes  Goal: *Verbalizes anxiety and depression are reduced or absent  Outcome: Progressing Towards Goal  Goal: *Verbalize understanding of risk factor modification(Stroke Metric)  Outcome: Progressing Towards Goal  Goal: *Hemodynamically stable  Outcome: Progressing Towards Goal  Goal: *Absence of aspiration pneumonia  Outcome: Progressing Towards Goal  Goal: *Aware of needed dietary changes  Outcome: Progressing Towards Goal  Goal: *Verbalize understanding of prescribed medications including anti-coagulants, anti-lipid, and/or anti-platelets(Stroke Metric)  Outcome: Progressing Towards Goal  Goal: *Tolerating diet  Outcome: Progressing Towards Goal  Goal: *Aware of follow-up diagnostics related to anticoagulants  Outcome: Progressing Towards Goal  Goal: *Ability to perform ADLs and demonstrates progressive mobility and function  Outcome: Progressing Towards Goal  Goal: *Absence of DVT(Stroke Metric)  Outcome: Progressing Towards Goal  Goal: *Absence of aspiration  Outcome: Progressing Towards Goal  Goal: *Optimal pain control at patient's stated goal  Outcome: Progressing Towards Goal  Goal: *Home safety concerns addressed  Outcome: Progressing Towards Goal  Goal: *Describes available resources and support systems  Outcome: Progressing Towards Goal  Goal: *Verbalizes understanding of activation of EMS(911) for stroke symptoms(Stroke Metric)  Outcome: Progressing Towards Goal  Goal: *Understands and describes signs and symptoms to report to providers(Stroke Metric)  Outcome: Progressing Towards Goal  Goal: *Neurolgocially stable (absence of additional neurological deficits)  Outcome: Progressing Towards Goal  Goal: *Verbalizes importance of follow-up with primary care physician(Stroke Metric)  Outcome: Progressing Towards Goal  Goal: *Smoking cessation discussed,if applicable(Stroke Metric)  Outcome: Progressing Towards Goal  Goal: *Depression screening completed(Stroke Metric)  Outcome: Progressing Towards Goal     Problem: Patient Education: Go to Patient Education Activity  Goal: Patient/Family Education  Outcome: Progressing Towards Goal     Problem: Falls - Risk of  Goal: *Absence of Falls  Description: Document Lc Fall Risk and appropriate interventions in the flowsheet.   Outcome: Progressing Towards Goal  Note: Fall Risk Interventions:  Mobility Interventions: Assess mobility with egress test,Bed/chair exit alarm,Communicate number of staff needed for ambulation/transfer,Mechanical lift,OT consult for ADLs,Patient to call before getting OOB,PT Consult for assist device competence,PT Consult for mobility concerns,Strengthening exercises (ROM-active/passive),Utilize walker, cane, or other assistive device,Utilize gait belt for transfers/ambulation    Mentation Interventions: Adequate sleep, hydration, pain control,Bed/chair exit alarm,Door open when patient unattended,Evaluate medications/consider consulting pharmacy,Gait belt with transfers/ambulation,Increase mobility,More frequent rounding,Reorient patient,Room close to nurse's station,Toileting rounds,Update white board    Medication Interventions: Assess postural VS orthostatic hypotension,Bed/chair exit alarm,Evaluate medications/consider consulting pharmacy,Patient to call before getting OOB,Teach patient to arise slowly,Utilize gait belt for transfers/ambulation    Elimination Interventions: Bed/chair exit alarm,Call light in reach,Patient to call for help with toileting needs,Stay With Me (per policy),Toilet paper/wipes in reach,Toileting schedule/hourly rounds    History of Falls Interventions: Bed/chair exit alarm,Consult care management for discharge planning,Door open when patient unattended,Evaluate medications/consider consulting pharmacy,Room close to nurse's station,Utilize gait belt for transfer/ambulation,Assess for delayed presentation/identification of injury for 48 hrs (comment for end date),Vital signs minimum Q4HRs X 24 hrs (comment for end date)         Problem: Patient Education: Go to Patient Education Activity  Goal: Patient/Family Education  Outcome: Progressing Towards Goal     Problem: Nutrition Deficit  Goal: *Optimize nutritional status  Outcome: Progressing Towards Goal

## 2022-05-10 NOTE — PROGRESS NOTES
NUTRITION    RD PLAN OF CARE:   Pt reviewed for follow-up, discussed in IDRs. Plans noted for d/c with hospice - awaiting financial review. Diet - Minced and Moist as awake/alert for comfort    Pt is admitted with Hemorrhagic stroke ([facility] [address] [facility] [I61.9]. Aggressive nutrition intervention is not indicated at this time, nutrition to sign-off. Please re-consult Nutrition services should plan of care change. Thank you.     Gurmeet Johnson RD    Contact via [employer] or ext [phone]

## 2022-05-10 NOTE — PROGRESS NOTES
Problem: Dysphagia (Adult)  Goal: *Acute Goals and Plan of Care (Insert Text)  Description: Speech Therapy Goals  Initiated 5/9/2022  1. Patient will tolerate soft and bite sized diet, thin liquids without overt s/s aspiration within 7 days. Outcome: Progressing Towards Goal     SPEECH LANGUAGE PATHOLOGY DYSPHAGIA TREATMENT  Patient: Derrell Campos (82 y.o. female)  Date: 5/10/2022  Diagnosis: [facility] [I61.[facility]] <principal problem not specified>       Precautions:       ASSESSMENT:  Patient received alert in bed, however significantly confused and talking constantly. Utilized , however patient too confused to attend to  and respond to his questions. Patient only agreeable to minimal bites of ice cream, which she tolerated with no difficulty, however patient then refused all other PO. Untouched lunch tray noted at bedside. Patient's swallow function and PO intake remains most limited by AMS. PLAN:  Recommendations and Planned Interventions:  -Continue minced and moist/thin liquid diet when patient accepting  -SLP to follow for diet tolerance  Patient continues to benefit from skilled intervention to address the above impairments. Continue treatment per established plan of care. Discharge Recommendations:  Hospice per chart review     SUBJECTIVE:   Patient stated I can't hear you in Croatian to the , despite good volume on stratus, suspect related to AMS/confusion. OBJECTIVE:   Cognitive and Communication Status:  Neurologic State: Alert,Confused  Orientation Level: Unable to verbalize  Cognition: Decreased attention/concentration,Decreased command following           Dysphagia Treatment:     P.O. Trials:  Patient Position: upright in bed  Vocal quality prior to P.O.: No impairment  Consistency Presented:  Other (comment) (ice cream)  How Presented: SLP-fed/presented;Spoon     Bolus Acceptance: No impairment  Bolus Formation/Control: Impaired  Type of Impairment: Delayed  Propulsion: Delayed (# of seconds)  Oral Residue: None        Aspiration Signs/Symptoms: None             Pain:  Pain Scale 1: Numeric (0 - 10)  Pain Intensity 1: 0       After treatment:   Patient left in no apparent distress in bed, Call bell within reach and Nursing notified    COMMUNICATION/EDUCATION:   Patient was too confused to benefit from education. The patient's plan of care including recommendations, planned interventions, and recommended diet changes were discussed with: Registered nurse.      Tangela Espana, SLP  Time Calculation: 15 mins

## 2022-05-10 NOTE — PROGRESS NOTES
Provided pastoral care visit to [facility] patient. Did not include sacramental care.     Chris Gray

## 2022-05-10 NOTE — PROGRESS NOTES
Bedside and Verbal shift change [facility] by Fly Rocha (offgoing nurse). Report included the following information SBAR, Kardex, Intake/Output, MAR, Recent Results, Cardiac Rhythm NSR/Sinus Tach and Dual Neuro Assessment.

## 2022-05-10 NOTE — PROGRESS NOTES
Transition of [address] (will need financial assistance)  *patient is uninsured*     RUR: 12% low     PCP F/U:      Disposition: likely [address]      Transportation: TBD: hospital to home.      Main Contact: Daughter: Ryan Garay and  Myra Godinez (speaks English): [phone]   Niece Tasha Corona [phone]   : [facility] ([phone]) and his dtr Michael Cousin Nikolas-([phone]). * needed*    [phone]: Spoke with [employer] rep [place]. States that they are having trouble reaching family with their  for medicaid screening. Provided point of contact for family to call: Toño Sloan at [phone]. Telephone call to son in law, Myra Godinez at [phone]. Have asked him to call Toño Sloan for medicaid screening. States that family usually visits around 6-7pm. Balbina from [address] [place] [address] updated. 1353: Received message from [employer] that they have spoken to daughter and son in law and that they are unable to provide any information regarding patient financials or estate. Have provided 's contact information to see if they can reach him. 1433: Returned hospice Ernestine LENZ's call. Left message to return call.     1506:Left message for : [employer] [employer] ([phone]) to return call    [phone]: Telephone call to patient's  Desean Mcdaniel ([phone]), using  [employer] [employer]. States that neither him nor his wife work. Have asked that he reach out to Kyle Varner at [phone] as they have tried to reach him multiple times. States that he will give her a call back.      Magnolia Brennan RN, CRM

## 2022-05-10 NOTE — HOSPICE
Hospice Liaison Note:    Chart reviewed for updates in plan of care. Bedside assessment:  Pt in bed, awake and alert. Smiling at staff. Does not appear to be in distress. Reviewed with bedside nurse and CM. Plan: Continue to offer Hospice support and education for patient and family as discharge plans are being made. Please call Hospice team to offer support for patient, family or staff.     Thank you for your coordination with the hospice plan of care      Lulu Garcia RN, [place] [address] Nurse Liaison  [phone] [place]  [phone] Office

## 2022-05-10 NOTE — PROGRESS NOTES
[facility]                                                                                          Hospitalist Progress Note  Michael Clifford MD  Answering service: [phone] OR [phone] from in house phone        Date of Service:  5/10/2022  NAME:  Catalina Torres  :  1955  MRN:  [id number]      Admission Summary:   Per H and P \"78year-old female with PMHx of schizophrenia who presents to the ED as a transfer from an OSH ED for right-sided mixed density right parietal parenchymal hemorrhage.  Per chart review, her symptoms began yesterday morning with left-sided hemiparesis.  Family brought her to the OSH today after she became more lethargic with AMS.  Neurosurgery evaluated patient and no need for neurosurgical intervention at this time. Carolyn Pearce was started on Cardene in the ED for hypertension and Precedex for agitation.  OSH was unable to perform CTA due to agitation.  Will attempt CTA while on Precedex.  She will be admitted to the ICU for further medical management\"     Interval history / Subjective:   Awake, Slovenian-speaking awaiting hospice  Moves left hand some but not the arm, no movement in left leg      Assessment & Plan:     # Right parieto-occipital and right falcine hemorrhages  -. Right parieto-occipital and right falcine hemorrhages with associated edema  and mass effect and leftward midline shift are not significantly changed from  prior. There is new the evident layering intraventricular hemorrhage in the  posterior left lateral ventricular horn. Conservative management per NSGY  -neurology following  -seroquel changed to Haldol   -c/w Keppra for total 7 days, started on , stop thereafter     #Dysphagia: comfort feeding      #HTN: BP uncontrolled   Goal less than 140  -lopressor IV as patient NPO    # elevated WBC - ?  Aspiration   - check CXR     # acidosis  With gap  - resolved with bicarb gtt  - no labs this AM, transitioning to hospice, will defer labs  - c/w d5 1/2 NS with KCL today, change to d5 1/2 ns in AM      # UTI - off abx now      Code status: Partial family  want's home hospice , Partial code   DVT prophylaxis: SCD    Care Plan discussed with: Nurse  Anticipated Disposition: SNF/LTC  Anticipated Discharge: Greater than 48 hours     Hospital Problems  Date Reviewed: 6/9/2015          Codes Class Noted POA    Hemorrhagic stroke [facility]) ICD-10-CM: I61.9  ICD-9-CM: 022  5/1/2022 Unknown                Review of Systems:   Review of systems not obtained due to patient factors. Vital Signs:    Last 24hrs VS reviewed since prior progress note. Most recent are:  Visit Vitals  BP (!) 154/78 (BP 1 Location: Left upper arm, BP Patient Position: At rest)   Pulse 95   Temp 98.4 °F (36.9 °C)   Resp 25   Ht 4' 7\" (1.397 m)   Wt 55.8 kg (123 lb 0.3 oz)   SpO2 96%   BMI 28.59 kg/m²         Intake/Output Summary (Last 24 hours) at 5/10/2022 1144  Last data filed at 5/10/2022 0800  Gross per 24 hour   Intake 3000 ml   Output    Net 3000 ml        Physical Examination:     I had a face to face encounter with this patient and independently examined them on 5/10/2022 as outlined below:          General : alert x 0, awake, no acute distress,  Chest: Clear to auscultation bilaterally   CVS: S1 S2 heard, Capillary refill less than 2 seconds  Abd: soft/ Non tender, non distended, BS physiological,   Ext: no clubbing, no cyanosis, no edema, brisk 2+ DP pulses  Neuro/Psych: pleasant mood and affect, left LE flaccid, LUE: +, otherwise flaccid   Skin: warm     Data Review:    I personally reviewed  Image and labs      Labs:     Recent Labs     05/10/22  0559 05/08/22  1215   WBC 8.4 7.0   HGB 11.6 11.0*   HCT 34.8* 34.6*    277     No results for input(s): NA, K, CL, CO2, BUN, CREA, GLU, CA, MG, PHOS, URICA in the last 72 hours. No results for input(s): ALT, AP, TBIL, TBILI, TP, ALB, GLOB, GGT, AML, LPSE in the last 72 hours.     No lab exists for component: SGOT, GPT, AMYP, HLPSE  No results for input(s): INR, PTP, APTT, INREXT, INREXT in the last 72 hours. No results for input(s): FE, TIBC, PSAT, FERR in the last 72 hours. No results found for: FOL, RBCF   No results for input(s): PH, PCO2, PO2 in the last 72 hours. No results for input(s): CPK, CKNDX, TROIQ in the last 72 hours.     No lab exists for component: CPKMB  Lab Results   Component Value Date/Time    Cholesterol, total 161 05/02/2022 05:22 AM    HDL Cholesterol 69 05/02/2022 05:22 AM    LDL, calculated 77.6 05/02/2022 05:22 AM    Triglyceride 72 05/02/2022 05:22 AM    CHOL/HDL Ratio 2.3 05/02/2022 05:22 AM     Lab Results   Component Value Date/Time    Glucose  fasting 10/30/2014 09:12 AM    Glucose POC 04 fasting 10/30/2014 08:56 AM     Lab Results   Component Value Date/Time    Color YELLOW/STRAW 05/02/2022 05:22 AM    Appearance CLEAR 05/02/2022 05:22 AM    Specific gravity 1.010 05/02/2022 05:22 AM    pH (UA) 6.0 05/02/2022 05:22 AM    Protein Negative 05/02/2022 05:22 AM    Glucose Negative 05/02/2022 05:22 AM    Ketone TRACE (A) 05/02/2022 05:22 AM    Bilirubin Negative 05/02/2022 05:22 AM    Urobilinogen 1.0 05/02/2022 05:22 AM    Nitrites Positive (A) 05/02/2022 05:22 AM    Leukocyte Esterase SMALL (A) 05/02/2022 05:22 AM    Epithelial cells FEW 05/02/2022 05:22 AM    Bacteria 4+ (A) 05/02/2022 05:22 AM    WBC 10-20 05/02/2022 05:22 AM    RBC 0-5 05/02/2022 05:22 AM         Medications Reviewed:     Current Facility-Administered Medications   Medication Dose Route Frequency    haloperidol (HALDOL) 2 mg/mL oral solution 2 mg  2 mg SubLINGual Q6H PRN    dextrose 5% - 0.45% NaCl with KCl 20 mEq/L infusion  75 mL/hr IntraVENous CONTINUOUS    hydrALAZINE (APRESOLINE) 20 mg/mL injection 10 mg  10 mg IntraVENous Q6H PRN    metoprolol (LOPRESSOR) injection 2.5 mg  2.5 mg IntraVENous Q6H    lidocaine 4 % patch 1 Patch  1 Patch TransDERmal Q24H    metoclopramide HCl (REGLAN) injection 5 mg  5 mg IntraVENous Q6H PRN    labetaloL (NORMODYNE;TRANDATE) injection 10 mg  10 mg IntraVENous Q2H PRN    sodium chloride (NS) flush 5-40 mL  5-40 mL IntraVENous Q8H    sodium chloride (NS) flush 5-40 mL  5-40 mL IntraVENous PRN    acetaminophen (TYLENOL) tablet 650 mg  650 mg Oral Q6H PRN    Or    acetaminophen (TYLENOL) suppository 650 mg  650 mg Rectal Q6H PRN    polyethylene glycol (MIRALAX) packet 17 g  17 g Oral DAILY PRN    ondansetron (ZOFRAN ODT) tablet 4 mg  4 mg Oral Q8H PRN    Or    ondansetron (ZOFRAN) injection 4 mg  4 mg IntraVENous Q6H PRN     ______________________________________________________________________  EXPECTED LENGTH OF STAY: 2d 21h  ACTUAL LENGTH OF STAY:          9           Jed Gaming MD

## 2022-05-11 PROCEDURE — 74011000250 HC RX REV CODE- 250: Performed by: NURSE PRACTITIONER

## 2022-05-11 PROCEDURE — 74011000250 HC RX REV CODE- 250: Performed by: INTERNAL MEDICINE

## 2022-05-11 PROCEDURE — 74011000250 HC RX REV CODE- 250: Performed by: HOSPITALIST

## 2022-05-11 PROCEDURE — 65270000029 HC RM PRIVATE

## 2022-05-11 PROCEDURE — 74011250637 HC RX REV CODE- 250/637: Performed by: INTERNAL MEDICINE

## 2022-05-11 RX ADMIN — METOPROLOL TARTRATE 2.5 MG: 1 INJECTION, SOLUTION INTRAVENOUS at 05:55

## 2022-05-11 RX ADMIN — METOPROLOL TARTRATE 2.5 MG: 1 INJECTION, SOLUTION INTRAVENOUS at 23:47

## 2022-05-11 RX ADMIN — SODIUM CHLORIDE, PRESERVATIVE FREE 10 ML: 5 INJECTION INTRAVENOUS at 22:15

## 2022-05-11 RX ADMIN — METOPROLOL TARTRATE 2.5 MG: 1 INJECTION, SOLUTION INTRAVENOUS at 00:53

## 2022-05-11 RX ADMIN — METOPROLOL TARTRATE 2.5 MG: 1 INJECTION, SOLUTION INTRAVENOUS at 17:45

## 2022-05-11 RX ADMIN — METOPROLOL TARTRATE 2.5 MG: 1 INJECTION, SOLUTION INTRAVENOUS at 13:11

## 2022-05-11 RX ADMIN — ACETAMINOPHEN 650 MG: 325 TABLET ORAL at 10:17

## 2022-05-11 NOTE — PROGRESS NOTES
Problem: Pressure Injury - Risk of  Goal: *Prevention of pressure injury  Description: Document Eugene Scale and appropriate interventions in the flowsheet. Outcome: Progressing Towards Goal  Note: Pressure Injury Interventions:  Sensory Interventions: Assess changes in LOC    Moisture Interventions: Absorbent underpads    Activity Interventions: Assess need for specialty bed    Mobility Interventions: Assess need for specialty bed    Nutrition Interventions: Document food/fluid/supplement intake,Offer support with meals,snacks and hydration    Friction and Shear Interventions: HOB 30 degrees or less,Lift sheet,Transferring/repositioning devices                Problem: Patient Education: Go to Patient Education Activity  Goal: Patient/Family Education  Outcome: Progressing Towards Goal     Problem: Falls - Risk of  Goal: *Absence of Falls  Description: Document Lc Fall Risk and appropriate interventions in the flowsheet.   Outcome: Progressing Towards Goal  Note: Fall Risk Interventions:  Mobility Interventions: Assess mobility with egress test,Bed/chair exit alarm    Mentation Interventions: Adequate sleep, hydration, pain control,Bed/chair exit alarm    Medication Interventions: Patient to call before getting OOB    Elimination Interventions: Bed/chair exit alarm    History of Falls Interventions: Bed/chair exit alarm

## 2022-05-11 NOTE — PROGRESS NOTES
SLP Contact Note    Pt not eating/drinking 2/2 confusion. Pt pursuing hospice. No further SLP needs. Will sign off.       Thank you,  DAIN Tim.Ed, [address] [facility]  Speech-Language Pathologist

## 2022-05-11 NOTE — PROGRESS NOTES
[address] [facility] Adult  Hospitalist Group                                                                                          Hospitalist Progress Note  Amish Barahona MD  Answering service: [phone] OR [phone] from in house phone        Date of Service:  2022  NAME:  Beth Cruz  :  1955  MRN:  [id number]      Admission Summary:   Per H and P \"78year-old female with PMHx of schizophrenia who presents to the ED as a transfer from an OSH ED for right-sided mixed density right parietal parenchymal hemorrhage.  Per chart review, her symptoms began yesterday morning with left-sided hemiparesis.  Family brought her to the OSH today after she became more lethargic with AMS.  Neurosurgery evaluated patient and no need for neurosurgical intervention at this time. Blanca Galicia was started on Cardene in the ED for hypertension and Precedex for agitation.  OSH was unable to perform CTA due to agitation.  Will attempt CTA while on Precedex.  She will be admitted to the ICU for further medical management\"     Interval history / Subjective:   Awake, Japanese-speaking awaiting hospice  Moves left hand some but not the arm, no movement in left leg   Not eating or drinking due to swallow issues and confusion , no meds given as well  Family pursuing hospice so no labs     Discharge plan home hospice no insurance      Assessment & Plan:     # Right parieto-occipital and right falcine hemorrhages  -. Right parieto-occipital and right falcine hemorrhages with associated edema  and mass effect and leftward midline shift are not significantly changed from  prior.  There is new the evident layering intraventricular hemorrhage in the  posterior left lateral ventricular horn.  -Conservative management per NSGY  -seen by neurology   -seroquel changed to Haldol   -c/w Keppra for total 7 days, started on , stop thereafter     #Dysphagia: comfort feeding but nothing given as pt confused      #HTN: BP uncontrolled   Goal less than 140  -lopressor IV as patient NPO    # elevated WBC - ? Aspiration   - resoled     # acidosis  With gap  - resolved with bicarb gtt     # UTI - off abx now      Code status:DNR  DVT prophylaxis: SCD    Care Plan discussed with: Nurse  Anticipated Disposition: SNF/LTC  Anticipated Discharge: Greater than 48 hours     Hospital Problems  Date Reviewed: 6/9/2015          Codes Class Noted POA    Hemorrhagic stroke [facility]) ICD-10-CM: I61.9  ICD-9-CM: 267  5/1/2022 Unknown                Review of Systems:   Review of systems not obtained due to patient factors. Vital Signs:    Last 24hrs VS reviewed since prior progress note. Most recent are:  Visit Vitals  BP (!) 156/83   Pulse 100   Temp 98 °F (36.7 °C)   Resp 16   Ht 4' 7\" (1.397 m)   Wt 54.1 kg (119 lb 4.8 oz)   SpO2 99%   BMI 27.73 kg/m²         Intake/Output Summary (Last 24 hours) at 5/11/2022 1359  Last data filed at 5/10/2022 1757  Gross per 24 hour   Intake    Output 1500 ml   Net -1500 ml        Physical Examination:     I had a face to face encounter with this patient and independently examined them on 5/11/2022 as outlined below:          General : alert x 0, awake, no acute distress,  Chest: Clear to auscultation bilaterally   CVS: S1 S2 heard, Capillary refill less than 2 seconds  Abd: soft/ Non tender, non distended, BS physiological,   Ext: no clubbing, no cyanosis, no edema, brisk 2+ DP pulses  Neuro/Psych: pleasant mood and affect, left LE flaccid, LUE: +, otherwise flaccid   Skin: warm     Data Review:    I personally reviewed  Image and labs      Labs:     Recent Labs     05/10/22  0559   WBC 8.4   HGB 11.6   HCT 34.8*        No results for input(s): NA, K, CL, CO2, BUN, CREA, GLU, CA, MG, PHOS, URICA in the last 72 hours. No results for input(s): ALT, AP, TBIL, TBILI, TP, ALB, GLOB, GGT, AML, LPSE in the last 72 hours.     No lab exists for component: SGOT, GPT, AMYP, HLPSE  No results for input(s): INR, PTP, APTT, INREXT, INREXT in the last 72 hours. No results for input(s): FE, TIBC, PSAT, FERR in the last 72 hours. No results found for: FOL, RBCF   No results for input(s): PH, PCO2, PO2 in the last 72 hours. No results for input(s): CPK, CKNDX, TROIQ in the last 72 hours.     No lab exists for component: CPKMB  Lab Results   Component Value Date/Time    Cholesterol, total 161 05/02/2022 05:22 AM    HDL Cholesterol 69 05/02/2022 05:22 AM    LDL, calculated 77.6 05/02/2022 05:22 AM    Triglyceride 72 05/02/2022 05:22 AM    CHOL/HDL Ratio 2.3 05/02/2022 05:22 AM     Lab Results   Component Value Date/Time    Glucose  fasting 10/30/2014 09:12 AM    Glucose POC 04 fasting 10/30/2014 08:56 AM     Lab Results   Component Value Date/Time    Color YELLOW/STRAW 05/02/2022 05:22 AM    Appearance CLEAR 05/02/2022 05:22 AM    Specific gravity 1.010 05/02/2022 05:22 AM    pH (UA) 6.0 05/02/2022 05:22 AM    Protein Negative 05/02/2022 05:22 AM    Glucose Negative 05/02/2022 05:22 AM    Ketone TRACE (A) 05/02/2022 05:22 AM    Bilirubin Negative 05/02/2022 05:22 AM    Urobilinogen 1.0 05/02/2022 05:22 AM    Nitrites Positive (A) 05/02/2022 05:22 AM    Leukocyte Esterase SMALL (A) 05/02/2022 05:22 AM    Epithelial cells FEW 05/02/2022 05:22 AM    Bacteria 4+ (A) 05/02/2022 05:22 AM    WBC 10-20 05/02/2022 05:22 AM    RBC 0-5 05/02/2022 05:22 AM         Medications Reviewed:     Current Facility-Administered Medications   Medication Dose Route Frequency    hydrALAZINE (APRESOLINE) 20 mg/mL injection 10 mg  10 mg IntraVENous Q6H PRN    melatonin tablet 3 mg  3 mg Oral QHS PRN    haloperidol (HALDOL) 2 mg/mL oral solution 2 mg  2 mg SubLINGual Q6H PRN    dextrose 5% - 0.45% NaCl with KCl 20 mEq/L infusion  75 mL/hr IntraVENous CONTINUOUS    metoprolol (LOPRESSOR) injection 2.5 mg  2.5 mg IntraVENous Q6H    lidocaine 4 % patch 1 Patch  1 Patch TransDERmal Q24H    metoclopramide HCl (REGLAN) injection 5 mg  5 mg IntraVENous Q6H PRN    labetaloL (NORMODYNE;TRANDATE) injection 10 mg  10 mg IntraVENous Q2H PRN    sodium chloride (NS) flush 5-40 mL  5-40 mL IntraVENous Q8H    sodium chloride (NS) flush 5-40 mL  5-40 mL IntraVENous PRN    acetaminophen (TYLENOL) tablet 650 mg  650 mg Oral Q6H PRN    Or    acetaminophen (TYLENOL) suppository 650 mg  650 mg Rectal Q6H PRN    polyethylene glycol (MIRALAX) packet 17 g  17 g Oral DAILY PRN    ondansetron (ZOFRAN ODT) tablet 4 mg  4 mg Oral Q8H PRN    Or    ondansetron (ZOFRAN) injection 4 mg  4 mg IntraVENous Q6H PRN     ______________________________________________________________________  EXPECTED LENGTH OF STAY: 2d 21h  ACTUAL LENGTH OF STAY:          Surinder Vora MD

## 2022-05-12 PROCEDURE — 74011000250 HC RX REV CODE- 250: Performed by: NURSE PRACTITIONER

## 2022-05-12 PROCEDURE — 74011000250 HC RX REV CODE- 250: Performed by: HOSPITALIST

## 2022-05-12 PROCEDURE — 74011000250 HC RX REV CODE- 250: Performed by: INTERNAL MEDICINE

## 2022-05-12 PROCEDURE — 74011250636 HC RX REV CODE- 250/636: Performed by: HOSPITALIST

## 2022-05-12 PROCEDURE — 65270000029 HC RM PRIVATE

## 2022-05-12 RX ORDER — METOPROLOL SUCCINATE 25 MG/1
25 TABLET, EXTENDED RELEASE ORAL DAILY
Status: DISCONTINUED | OUTPATIENT
Start: 2022-05-13 | End: 2022-05-17 | Stop reason: HOSPADM

## 2022-05-12 RX ADMIN — SODIUM CHLORIDE, PRESERVATIVE FREE 10 ML: 5 INJECTION INTRAVENOUS at 06:16

## 2022-05-12 RX ADMIN — METOPROLOL TARTRATE 2.5 MG: 1 INJECTION, SOLUTION INTRAVENOUS at 11:33

## 2022-05-12 RX ADMIN — METOPROLOL TARTRATE 2.5 MG: 1 INJECTION, SOLUTION INTRAVENOUS at 06:15

## 2022-05-12 RX ADMIN — SODIUM CHLORIDE, PRESERVATIVE FREE 10 ML: 5 INJECTION INTRAVENOUS at 22:44

## 2022-05-12 RX ADMIN — POTASSIUM CHLORIDE, DEXTROSE MONOHYDRATE AND SODIUM CHLORIDE 75 ML/HR: 150; 5; 450 INJECTION, SOLUTION INTRAVENOUS at 07:34

## 2022-05-12 NOTE — PROGRESS NOTES
Transition of Care Plan  RUR 12%    Disposition   R Abigail Veronica 2  --[facility] while waiting for Medicaid ) [employer] applied for medicaid)    Patient approved 100% for FAP. Does not meet GIP level of care    Transportation  BLS--Hospital to Home  [phone]    Contact  Daughter Morenita Castillo and DENISE Mazariegos (speaks English-- [phone]  Niluba Moss [phone] (speaks only Wolof)  [facility]  [phone] (speaks only Wolof)  Son in law main  for family    CM left message for Sandra WALKER ([phone] today--asking him to call CM on 5W to discuss transition of care plan--  CM will talk with him about patient transferring to [facility] while waiting for Medicaid--     There was no family in patient's room-- at 4:45 pm to talk to regarding patient being transferred to [facility]. Atif Toney

## 2022-05-12 NOTE — HOSPICE
[facility] Help to Those in Need  [phone]    Patient Name: Khari Mims  YOB: 1955  Age: 78 [facility]. [facility]:  Hospice consult noted. Chart reviewed. Plan of care discussed with patients care manager. This LCSW in to meet with pt. Pt is confused and lethargic. Per Hospice NP pt does not meet [facility] level of care and we are unable to admit her to [facility] at this time as [facility] is not a long term facility and pt could only stay there 21 days. LCSW updated CM Dianelys Aguilar CM. [facility] will continue to assess to for GIP hospice. Pt has been approved 100% for FAP. Medicaid is pending. 5/13/2022 LCSW spoke to CM Cat, re pt Medicaid. Per Cat, pt will not qualify for LTC Medicaid because she is not a citizen. LCSW and Cat discussed [facility] will continue to follow pt for GIP admission or routine admission the last 21 days of life at the [facility]. FAP approved  100% will cover [facility]. Thank you for the opportunity to be of service to Mrs. Yusuf Al and her family.     Ernestine Ortega [facility], [address] [place] [address]  [phone]

## 2022-05-12 NOTE — HOSPICE
[facility] [employer] Worker Note:     LCSW met with pts daughter and son in law to review hospice services.  services were use throughout visit. Pts daughter and son in law provided history of pts decline and mental health history. Pts daughter has completed FAP and signed letter needed for supporting documents. Application was sent to [facility] for review. LCSW explained levels of care and explained at this point pt does not meet GIP level of care that is needed to admit pt in the hospital. LCSW explained hospice services in the home but family reported they are unable to provide pt adequate care in their home setting due to having to work. LCSW discussed routine level of care and pts daughter stated this was discussed previously with hospice staff and that they would like this at [facility]. LCSW explained that discharge plan would have to be worked on continuously as [facility] is not a long term care facility and pt could only stay 21 days under routine level of care. LCSW explained that finding a Medicaid pending bed would be very challenging. LCSW explained that [facility] was based on bed availability. LCSW sent a message to [employer] to see if they had any success with contacting pts spouse to apply for Medicaid as they were having a difficult time getting ahold of him. LCSW explained this would need to be completed for FAP to be approved. Pts nurse, Yvan Delgado was present for some of the family meeting and explained why pt was moved to the 5th floor as pts daughter was not made aware this was happening. Pts daughter reported pt appears to have increased confusion and forgets she is at the hospital at times. Pts daughter stated pt is hard of hearing and wears glasses but due to her mental health she does not have glasses or hearing aids due to breaking them often. LCSW provided supportive counseling and validation of feelings. LCSW discussed final arrangements and pts daughter stated pt had not made any at this time. LCSW reviewed resource guide and relayed SW could assist her with this. Pts daughter and DENISE were appreciative of information and meeting. SW will submit FAP and will update family on decision from [facility] team. SW will follow up with pts family about bed availability at [facility]. LCSW will continue to monitor and assess needs.          Genevieve Serve LCSW   Clinical Supervisor of Psychosocial Services  [facility]   [phone]

## 2022-05-12 NOTE — PROGRESS NOTES
Physician Progress Note      Alyssa Donato  CSN #:                  [id number]  :                       1955  ADMIT DATE:       2022 7:04 PM  [address] [place] DATE:  RESPONDING  PROVIDER #:        Dang Coats MD          QUERY TEXT:    Pt admitted with intracerebral hemorrhage. Pt noted to have MRI results of intraparenchymal hemorrhage and mild surrounding edema, with mass effect resulting in partial effacement of the right lateral ventricle and 4 mm of leftward midline shift per MRI. Pt was placed on Cardene gtt and Keppra initially, but is noted to be currently transitioning to hospice. If clinically significant, please document in progress notes and discharge summary if you are evaluating/treating any of the following: The medical record reflects the following:    Risk Factors: HTN, ICH    Clinical Indicators:  -- Hospitalist noted Right parieto-occipital and right falcine hemorrhages  -- MRI  = 1. Stable large right superior frontoparietal acute intraparenchymal hemorrhage and mild surrounding edema, with mass effect resulting in partial effacement of the right lateral ventricle and 4 mm of leftward midline shift. No evidence of underlying mass. 2. Stable trace acute subdural hemorrhage along the falx, and stable trace intraventricular hemorrhage. The ventricles remain normal in size. -- CT head  = Acute intracranial right posterior parietal bleed with midline shift as described. Small right falcine 2 mm subdural hematoma. -- CT head 2 = 1. Right parieto-occipital and right falcine hemorrhages with associated edema and mass effect and leftward midline shift are not significantly changed from prior. 2. There is new the evident layering intraventricular hemorrhage in the posterior left lateral ventricular horn. 3. No acute infarct or other acute interval change.   -- CTA head/neck 2 = No significant change in right superior parieto-occipital acute parenchymal hemorrhage with edema and mass effect. No evidence for intracranial aneurysm. Treatment: Neurosurgery consult, Keppra, Cardene gtt, IV metoprolol & labetalol, imaging studies    Thank-you,  José Miguel Garrison RN, [facility]  Clinical   [phone]  [email or web address]  Options provided:  -- Cerebral edema  -- Brain compression  -- Cerebral edema and Brain compression  -- Other - I will add my own diagnosis  -- Disagree - Not applicable / Not valid  -- Disagree - Clinically unable to determine / Unknown  -- Refer to Clinical Documentation Reviewer    PROVIDER RESPONSE TEXT:    This patient has cerebral edema and brain compression.     Query created by: Jarvis Hawk on 5/11/2022 5:56 PM      Electronically signed by:  Ese Adkins MD 5/12/2022 9:10 AM

## 2022-05-12 NOTE — PROGRESS NOTES
[facility] [facility] Adult  Hospitalist Group                                                                                          Hospitalist Progress Note  Geovanna Paul MD  Answering service: [phone] OR [phone] from in house phone        Date of Service:  2022  NAME:  Leilani Ashton  :  1955  MRN:  [id number]      Admission Summary:   Per H and P \"78year-old female with PMHx of schizophrenia who presents to the ED as a transfer from an OSH ED for right-sided mixed density right parietal parenchymal hemorrhage.  Per chart review, her symptoms began yesterday morning with left-sided hemiparesis.  Family brought her to the OSH today after she became more lethargic with AMS.  Neurosurgery evaluated patient and no need for neurosurgical intervention at this time. Kartik Chacon was started on Cardene in the ED for hypertension and Precedex for agitation.  OSH was unable to perform CTA due to agitation.  Will attempt CTA while on Precedex.  She will be admitted to the ICU for further medical management\"     Interval history / Subjective:     No acute events overnight    Discharge plan home hospice no insurance      Assessment & Plan:     # Right parieto-occipital and right falcine hemorrhages  -. Right parieto-occipital and right falcine hemorrhages with associated edema  and mass effect and leftward midline shift are not significantly changed from  prior. There is new the evident layering intraventricular hemorrhage in the  posterior left lateral ventricular horn.  -Conservative management per NSGY  -seen by neurology   -seroquel changed to Haldol   -completed Keppra     #Dysphagia: comfort feeding but nothing given as pt confused      #HTN: BP uncontrolled   Goal less than 140  -change IV metoprolol to PO  -d/c IV fluids    # elevated WBC - ?  Aspiration   - resoled     # acidosis  With gap  - resolved with bicarb gtt     # UTI - off abx now      Code status:DNR  DVT prophylaxis: SCD    Care Plan discussed with: Nurse  Anticipated Disposition: SNF/LTC  Anticipated Discharge: Greater than 48 hours     Hospital Problems  Date Reviewed: 6/9/2015          Codes Class Noted POA    Hemorrhagic stroke [facility]) ICD-10-CM: I61.9  ICD-9-CM: 177  5/1/2022 Unknown                Review of Systems:   Review of systems not obtained due to patient factors. Vital Signs:    Last 24hrs VS reviewed since prior progress note. Most recent are:  Visit Vitals  /83 (BP 1 Location: Right upper arm, BP Patient Position: At rest;Lying)   Pulse 77   Temp 98.6 °F (37 °C)   Resp 16   Ht 4' 7\" (1.397 m)   Wt 54.1 kg (119 lb 4.8 oz)   SpO2 97%   BMI 27.73 kg/m²       No intake or output data in the 24 hours ending 05/12/22 1446     Physical Examination:     I had a face to face encounter with this patient and independently examined them on 5/12/2022 as outlined below:          General : alert x 0, awake, no acute distress,  Chest: normal work of breathing   CVS: rrr  Abd: soft/ Non tender, non distended, BS physiological,   Ext: no clubbing, no cyanosis, no edema, brisk 2+ DP pulses  Neuro/Psych: left LE flaccid, LUE: +, otherwise flaccid   Skin: warm     Data Review:    I personally reviewed  Image and labs      Labs:     Recent Labs     05/10/22  0559   WBC 8.4   HGB 11.6   HCT 34.8*        No results for input(s): NA, K, CL, CO2, BUN, CREA, GLU, CA, MG, PHOS, URICA in the last 72 hours. No results for input(s): ALT, AP, TBIL, TBILI, TP, ALB, GLOB, GGT, AML, LPSE in the last 72 hours. No lab exists for component: SGOT, GPT, AMYP, HLPSE  No results for input(s): INR, PTP, APTT, INREXT, INREXT in the last 72 hours. No results for input(s): FE, TIBC, PSAT, FERR in the last 72 hours. No results found for: FOL, RBCF   No results for input(s): PH, PCO2, PO2 in the last 72 hours. No results for input(s): CPK, CKNDX, TROIQ in the last 72 hours.     No lab exists for component: CPKMB  Lab Results   Component Value Date/Time    Cholesterol, total 161 05/02/2022 05:22 AM    HDL Cholesterol 69 05/02/2022 05:22 AM    LDL, calculated 77.6 05/02/2022 05:22 AM    Triglyceride 72 05/02/2022 05:22 AM    CHOL/HDL Ratio 2.3 05/02/2022 05:22 AM     Lab Results   Component Value Date/Time    Glucose  fasting 10/30/2014 09:12 AM    Glucose POC 04 fasting 10/30/2014 08:56 AM     Lab Results   Component Value Date/Time    Color YELLOW/STRAW 05/02/2022 05:22 AM    Appearance CLEAR 05/02/2022 05:22 AM    Specific gravity 1.010 05/02/2022 05:22 AM    pH (UA) 6.0 05/02/2022 05:22 AM    Protein Negative 05/02/2022 05:22 AM    Glucose Negative 05/02/2022 05:22 AM    Ketone TRACE (A) 05/02/2022 05:22 AM    Bilirubin Negative 05/02/2022 05:22 AM    Urobilinogen 1.0 05/02/2022 05:22 AM    Nitrites Positive (A) 05/02/2022 05:22 AM    Leukocyte Esterase SMALL (A) 05/02/2022 05:22 AM    Epithelial cells FEW 05/02/2022 05:22 AM    Bacteria 4+ (A) 05/02/2022 05:22 AM    WBC 10-20 05/02/2022 05:22 AM    RBC 0-5 05/02/2022 05:22 AM         Medications Reviewed:     Current Facility-Administered Medications   Medication Dose Route Frequency    hydrALAZINE (APRESOLINE) 20 mg/mL injection 10 mg  10 mg IntraVENous Q6H PRN    melatonin tablet 3 mg  3 mg Oral QHS PRN    haloperidol (HALDOL) 2 mg/mL oral solution 2 mg  2 mg SubLINGual Q6H PRN    dextrose 5% - 0.45% NaCl with KCl 20 mEq/L infusion  75 mL/hr IntraVENous CONTINUOUS    metoprolol (LOPRESSOR) injection 2.5 mg  2.5 mg IntraVENous Q6H    lidocaine 4 % patch 1 Patch  1 Patch TransDERmal Q24H    metoclopramide HCl (REGLAN) injection 5 mg  5 mg IntraVENous Q6H PRN    labetaloL (NORMODYNE;TRANDATE) injection 10 mg  10 mg IntraVENous Q2H PRN    sodium chloride (NS) flush 5-40 mL  5-40 mL IntraVENous Q8H    sodium chloride (NS) flush 5-40 mL  5-40 mL IntraVENous PRN    acetaminophen (TYLENOL) tablet 650 mg  650 mg Oral Q6H PRN    Or    acetaminophen (TYLENOL) suppository 650 mg  650 mg Rectal Q6H PRN    polyethylene glycol (MIRALAX) packet 17 g  17 g Oral DAILY PRN    ondansetron (ZOFRAN ODT) tablet 4 mg  4 mg Oral Q8H PRN    Or    ondansetron (ZOFRAN) injection 4 mg  4 mg IntraVENous Q6H PRN     ______________________________________________________________________  EXPECTED LENGTH OF STAY: 4d 9h  ACTUAL LENGTH OF STAY:          11           Renetta Bradley MD

## 2022-05-12 NOTE — PROGRESS NOTES
Rounded on Taoist patients and provided Anointing of the Sick and Commendation of the Dying at request of family.     Veto Saver

## 2022-05-12 NOTE — PROGRESS NOTES
Problem: Pressure Injury - Risk of  Goal: *Prevention of pressure injury  Description: Document Eugene Scale and appropriate interventions in the flowsheet.   Outcome: Progressing Towards Goal  Note: Pressure Injury Interventions:  Sensory Interventions: Assess changes in LOC,Minimize linen layers,Pressure redistribution bed/mattress (bed type),Sit a 90-degree angle/use footstool if needed    Moisture Interventions: Absorbent underpads,Check for incontinence Q2 hours and as needed,Internal/External urinary devices    Activity Interventions: Increase time out of bed,Pressure redistribution bed/mattress(bed type),PT/OT evaluation    Mobility Interventions: HOB 30 degrees or less,Pressure redistribution bed/mattress (bed type),PT/OT evaluation    Nutrition Interventions: Offer support with meals,snacks and hydration    Friction and Shear Interventions: HOB 30 degrees or less,Lift sheet,Minimize layers                Problem: Patient Education: Go to Patient Education Activity  Goal: Patient/Family Education  Outcome: Progressing Towards Goal     Problem: Patient Education: Go to Patient Education Activity  Goal: Patient/Family Education  Outcome: Progressing Towards Goal     Problem: Ischemic Stroke: Discharge Outcomes  Goal: *Verbalizes anxiety and depression are reduced or absent  Outcome: Progressing Towards Goal  Goal: *Verbalize understanding of risk factor modification(Stroke Metric)  Outcome: Progressing Towards Goal  Goal: *Hemodynamically stable  Outcome: Progressing Towards Goal  Goal: *Absence of aspiration pneumonia  Outcome: Progressing Towards Goal  Goal: *Aware of needed dietary changes  Outcome: Progressing Towards Goal  Goal: *Verbalize understanding of prescribed medications including anti-coagulants, anti-lipid, and/or anti-platelets(Stroke Metric)  Outcome: Progressing Towards Goal  Goal: *Tolerating diet  Outcome: Progressing Towards Goal  Goal: *Aware of follow-up diagnostics related to anticoagulants  Outcome: Progressing Towards Goal  Goal: *Ability to perform ADLs and demonstrates progressive mobility and function  Outcome: Progressing Towards Goal  Goal: *Absence of DVT(Stroke Metric)  Outcome: Progressing Towards Goal  Goal: *Absence of aspiration  Outcome: Progressing Towards Goal  Goal: *Optimal pain control at patient's stated goal  Outcome: Progressing Towards Goal  Goal: *Home safety concerns addressed  Outcome: Progressing Towards Goal  Goal: *Describes available resources and support systems  Outcome: Progressing Towards Goal  Goal: *Verbalizes understanding of activation of EMS(911) for stroke symptoms(Stroke Metric)  Outcome: Progressing Towards Goal  Goal: *Understands and describes signs and symptoms to report to providers(Stroke Metric)  Outcome: Progressing Towards Goal  Goal: *Neurolgocially stable (absence of additional neurological deficits)  Outcome: Progressing Towards Goal  Goal: *Verbalizes importance of follow-up with primary care physician(Stroke Metric)  Outcome: Progressing Towards Goal  Goal: *Smoking cessation discussed,if applicable(Stroke Metric)  Outcome: Progressing Towards Goal  Goal: *Depression screening completed(Stroke Metric)  Outcome: Progressing Towards Goal     Problem: Patient Education: Go to Patient Education Activity  Goal: Patient/Family Education  Outcome: Progressing Towards Goal     Problem: Falls - Risk of  Goal: *Absence of Falls  Description: Document Lc Fall Risk and appropriate interventions in the flowsheet.   Outcome: Progressing Towards Goal     Problem: Patient Education: Go to Patient Education Activity  Goal: Patient/Family Education  Outcome: Progressing Towards Goal     Problem: Nutrition Deficit  Goal: *Optimize nutritional status  Outcome: Progressing Towards Goal

## 2022-05-12 NOTE — HOSPICE
[facility] :      LCSW confirmed FAP was approved with [employer] team. LCSW also confirmed with [employer] pt has applied for Medicaid and it is pending. LCSW updated hospice team. LCSW will continue to monitor and assess needs.     Rancho Enrique LCSW   Clinical Supervisor of Psychosocial Services   [address] [place] [address]   [phone]

## 2022-05-13 PROCEDURE — 74011000250 HC RX REV CODE- 250: Performed by: NURSE PRACTITIONER

## 2022-05-13 PROCEDURE — 65270000029 HC RM PRIVATE

## 2022-05-13 PROCEDURE — 74011250637 HC RX REV CODE- 250/637: Performed by: HOSPITALIST

## 2022-05-13 RX ADMIN — METOPROLOL SUCCINATE 25 MG: 25 TABLET, EXTENDED RELEASE ORAL at 09:42

## 2022-05-13 NOTE — PROGRESS NOTES
[facility] Adult  Hospitalist Group                                                                                          Hospitalist Progress Note  Zulay Walter MD  Answering service: [phone] OR [phone] from in house phone        Date of Service:  2022  NAME:  Chadd Macdonald  :  1955  MRN:  [id number]      Admission Summary:   Per H and P \"78year-old female with PMHx of schizophrenia who presents to the ED as a transfer from an OSH ED for right-sided mixed density right parietal parenchymal hemorrhage.  Per chart review, her symptoms began yesterday morning with left-sided hemiparesis.  Family brought her to the OSH today after she became more lethargic with AMS.  Neurosurgery evaluated patient and no need for neurosurgical intervention at this time. Henrique Sow was started on Cardene in the ED for hypertension and Precedex for agitation.  OSH was unable to perform CTA due to agitation.  Will attempt CTA while on Precedex.  She will be admitted to the ICU for further medical management\"     Interval history / Subjective:     No acute events overnight    Discharge plan home hospice no insurance      Assessment & Plan:     # Right parieto-occipital and right falcine hemorrhages  -. Right parieto-occipital and right falcine hemorrhages with associated edema  and mass effect and leftward midline shift are not significantly changed from  prior. There is new the evident layering intraventricular hemorrhage in the  posterior left lateral ventricular horn.  -Conservative management per NSGY  -seen by neurology   -seroquel changed to Haldol prn  -completed Keppra     #Dysphagia: comfort feeding     #HTN:  Continue metoprolol    # elevated WBC - ?  Aspiration   - resoled     # acidosis  With gap  - resolved with bicarb gtt     # UTI - off abx now      Code status:DNR  DVT prophylaxis: SCD     Anticipated Disposition: to [facility] when bed available  Anticipated Discharge: Greater than 48 hours Hospital Problems  Date Reviewed: 6/9/2015          Codes Class Noted POA    Hemorrhagic stroke [facility]) ICD-10-CM: I61.9  ICD-9-CM: 688  5/1/2022 Unknown                Review of Systems:   Review of systems not obtained due to patient factors. Vital Signs:    Last 24hrs VS reviewed since prior progress note. Most recent are:  Visit Vitals  /75 (BP 1 Location: Right upper arm, BP Patient Position: At rest;Lying)   Pulse 74   Temp 97.6 °F (36.4 °C)   Resp 17   Ht 4' 7\" (1.397 m)   Wt 54.1 kg (119 lb 4.8 oz)   SpO2 97%   BMI 27.73 kg/m²       No intake or output data in the 24 hours ending 05/13/22 1507     Physical Examination:     I had a face to face encounter with this patient and independently examined them on 5/13/2022 as outlined below:          General : nad  Chest: normal work of breathing  Neuro/Psych: CN grossly intact    Data Review:    I personally reviewed  Image and labs      Labs:     No results for input(s): WBC, HGB, HCT, PLT, HGBEXT, HCTEXT, PLTEXT, HGBEXT, HCTEXT, PLTEXT in the last 72 hours. No results for input(s): NA, K, CL, CO2, BUN, CREA, GLU, CA, MG, PHOS, URICA in the last 72 hours. No results for input(s): ALT, AP, TBIL, TBILI, TP, ALB, GLOB, GGT, AML, LPSE in the last 72 hours. No lab exists for component: SGOT, GPT, AMYP, HLPSE  No results for input(s): INR, PTP, APTT, INREXT, INREXT in the last 72 hours. No results for input(s): FE, TIBC, PSAT, FERR in the last 72 hours. No results found for: FOL, RBCF   No results for input(s): PH, PCO2, PO2 in the last 72 hours. No results for input(s): CPK, CKNDX, TROIQ in the last 72 hours.     No lab exists for component: CPKMB  Lab Results   Component Value Date/Time    Cholesterol, total 161 05/02/2022 05:22 AM    HDL Cholesterol 69 05/02/2022 05:22 AM    LDL, calculated 77.6 05/02/2022 05:22 AM    Triglyceride 72 05/02/2022 05:22 AM    CHOL/HDL Ratio 2.3 05/02/2022 05:22 AM     Lab Results   Component Value Date/Time Glucose  fasting 10/30/2014 09:12 AM    Glucose POC 04 fasting 10/30/2014 08:56 AM     Lab Results   Component Value Date/Time    Color YELLOW/STRAW 05/02/2022 05:22 AM    Appearance CLEAR 05/02/2022 05:22 AM    Specific gravity 1.010 05/02/2022 05:22 AM    pH (UA) 6.0 05/02/2022 05:22 AM    Protein Negative 05/02/2022 05:22 AM    Glucose Negative 05/02/2022 05:22 AM    Ketone TRACE (A) 05/02/2022 05:22 AM    Bilirubin Negative 05/02/2022 05:22 AM    Urobilinogen 1.0 05/02/2022 05:22 AM    Nitrites Positive (A) 05/02/2022 05:22 AM    Leukocyte Esterase SMALL (A) 05/02/2022 05:22 AM    Epithelial cells FEW 05/02/2022 05:22 AM    Bacteria 4+ (A) 05/02/2022 05:22 AM    WBC 10-20 05/02/2022 05:22 AM    RBC 0-5 05/02/2022 05:22 AM         Medications Reviewed:     Current Facility-Administered Medications   Medication Dose Route Frequency    metoprolol succinate (TOPROL-XL) XL tablet 25 mg  25 mg Oral DAILY    hydrALAZINE (APRESOLINE) 20 mg/mL injection 10 mg  10 mg IntraVENous Q6H PRN    melatonin tablet 3 mg  3 mg Oral QHS PRN    haloperidol (HALDOL) 2 mg/mL oral solution 2 mg  2 mg SubLINGual Q6H PRN    lidocaine 4 % patch 1 Patch  1 Patch TransDERmal Q24H    metoclopramide HCl (REGLAN) injection 5 mg  5 mg IntraVENous Q6H PRN    labetaloL (NORMODYNE;TRANDATE) injection 10 mg  10 mg IntraVENous Q2H PRN    sodium chloride (NS) flush 5-40 mL  5-40 mL IntraVENous Q8H    sodium chloride (NS) flush 5-40 mL  5-40 mL IntraVENous PRN    acetaminophen (TYLENOL) tablet 650 mg  650 mg Oral Q6H PRN    Or    acetaminophen (TYLENOL) suppository 650 mg  650 mg Rectal Q6H PRN    polyethylene glycol (MIRALAX) packet 17 g  17 g Oral DAILY PRN    ondansetron (ZOFRAN ODT) tablet 4 mg  4 mg Oral Q8H PRN    Or    ondansetron (ZOFRAN) injection 4 mg  4 mg IntraVENous Q6H PRN     ______________________________________________________________________  EXPECTED LENGTH OF STAY: 4d 9h  ACTUAL LENGTH OF STAY: Carole , MD

## 2022-05-13 NOTE — PROGRESS NOTES
RUR 12 %     Transitions of Care Plan-     Transfer to [facility] when accepted and bed available-  [facility]. MATTHIAS spoke with son in - [place] Gee- (speaks [place])  [phone], the family is in agreement with [facility] transfer when bed available. The family is not able to care for the patient in the home. Other contact- Daughter- Kalen Rivera. MATTHIAS spoke with Ernestine with  Hospice to clarify the plan. The patient is only eligible for emergency Medicaid. The patient does not qualify  for LTC Medicaid benefits. The patient is a non citizen. [employer] screened the patient on 5/12 #[id number]. CM will continue to follow.      CARLY Bradford

## 2022-05-14 PROCEDURE — 74011000250 HC RX REV CODE- 250: Performed by: NURSE PRACTITIONER

## 2022-05-14 PROCEDURE — 65270000029 HC RM PRIVATE

## 2022-05-14 PROCEDURE — 74011250637 HC RX REV CODE- 250/637: Performed by: HOSPITALIST

## 2022-05-14 RX ADMIN — METOPROLOL SUCCINATE 25 MG: 25 TABLET, EXTENDED RELEASE ORAL at 09:38

## 2022-05-14 NOTE — PROGRESS NOTES
Bedside shift change report given to BOB Dunn (oncoming nurse) by Alex Fraser (offgoing nurse). Report included the following information SBAR, MAR and Dual Neuro Assessment.

## 2022-05-14 NOTE — PROGRESS NOTES
Problem: Pressure Injury - Risk of  Goal: *Prevention of pressure injury  Description: Document Eugene Scale and appropriate interventions in the flowsheet. Outcome: Progressing Towards Goal  Note: Pressure Injury Interventions:  Sensory Interventions: Assess changes in LOC    Moisture Interventions: Absorbent underpads,Apply protective barrier, creams and emollients,Internal/External urinary devices,Minimize layers    Activity Interventions: Pressure redistribution bed/mattress(bed type)    Mobility Interventions: Pressure redistribution bed/mattress (bed type)    Nutrition Interventions: Offer support with meals,snacks and hydration    Friction and Shear Interventions: HOB 30 degrees or less                Problem: Falls - Risk of  Goal: *Absence of Falls  Description: Document Lc Fall Risk and appropriate interventions in the flowsheet.   Outcome: Progressing Towards Goal  Note: Fall Risk Interventions:  Mobility Interventions: Bed/chair exit alarm    Mentation Interventions: Bed/chair exit alarm,Adequate sleep, hydration, pain control,Room close to nurse's station    Medication Interventions: Bed/chair exit alarm    Elimination Interventions: Bed/chair exit alarm    History of Falls Interventions: Bed/chair exit alarm

## 2022-05-14 NOTE — PROGRESS NOTES
[facility] [facility] Adult  Hospitalist Group                                                                                          Hospitalist Progress Note  Hinda Angelucci, MD  Answering service: [phone] OR [phone] from in house phone        Date of Service:  2022  NAME:  Cm Kessler  :  1955  MRN:  [id number]      Admission Summary:   Per H and P \"78year-old female with PMHx of schizophrenia who presents to the ED as a transfer from an OSH ED for right-sided mixed density right parietal parenchymal hemorrhage.  Per chart review, her symptoms began yesterday morning with left-sided hemiparesis.  Family brought her to the OSH today after she became more lethargic with AMS.  Neurosurgery evaluated patient and no need for neurosurgical intervention at this time. Estefani Mills was started on Cardene in the ED for hypertension and Precedex for agitation.  OSH was unable to perform CTA due to agitation.  Will attempt CTA while on Precedex.  She will be admitted to the ICU for further medical management\"     Interval history / Subjective:     No acute events overnight    Discharge plan home hospice no insurance      Assessment & Plan:     # Right parieto-occipital and right falcine hemorrhages  -. Right parieto-occipital and right falcine hemorrhages with associated edema  and mass effect and leftward midline shift are not significantly changed from  prior. There is new the evident layering intraventricular hemorrhage in the  posterior left lateral ventricular horn.  -Conservative management per NSGY  -seen by neurology   -seroquel changed to Haldol prn  -completed Keppra     #Dysphagia: comfort feeding     #HTN:  Continue metoprolol    # elevated WBC - ?  Aspiration   - resoled     # acidosis  With gap  - resolved with bicarb gtt     # UTI - off abx now      Code status:DNR  DVT prophylaxis: SCD     Anticipated Disposition: to [facility] when bed available  Anticipated Discharge: Greater than 48 hours Hospital Problems  Date Reviewed: 6/9/2015          Codes Class Noted POA    Hemorrhagic stroke [facility]) ICD-10-CM: I61.9  ICD-9-CM: 217  5/1/2022 Unknown                Review of Systems:   Review of systems not obtained due to patient factors. Vital Signs:    Last 24hrs VS reviewed since prior progress note. Most recent are:  Visit Vitals  BP (!) 105/59 (BP 1 Location: Right upper arm, BP Patient Position: At rest)   Pulse 70   Temp 98.2 °F (36.8 °C)   Resp 18   Ht 4' 7\" (1.397 m)   Wt 54.1 kg (119 lb 4.8 oz)   SpO2 95%   BMI 27.73 kg/m²         Intake/Output Summary (Last 24 hours) at 5/14/2022 1357  Last data filed at 5/14/2022 0215  Gross per 24 hour   Intake    Output 1900 ml   Net -1900 ml        Physical Examination:           General : nad  Chest: normal work of breathing      Data Review:    I personally reviewed  Image and labs      Labs:     No results for input(s): WBC, HGB, HCT, PLT, HGBEXT, HCTEXT, PLTEXT, HGBEXT, HCTEXT, PLTEXT in the last 72 hours. No results for input(s): NA, K, CL, CO2, BUN, CREA, GLU, CA, MG, PHOS, URICA in the last 72 hours. No results for input(s): ALT, AP, TBIL, TBILI, TP, ALB, GLOB, GGT, AML, LPSE in the last 72 hours. No lab exists for component: SGOT, GPT, AMYP, HLPSE  No results for input(s): INR, PTP, APTT, INREXT, INREXT in the last 72 hours. No results for input(s): FE, TIBC, PSAT, FERR in the last 72 hours. No results found for: FOL, RBCF   No results for input(s): PH, PCO2, PO2 in the last 72 hours. No results for input(s): CPK, CKNDX, TROIQ in the last 72 hours.     No lab exists for component: CPKMB  Lab Results   Component Value Date/Time    Cholesterol, total 161 05/02/2022 05:22 AM    HDL Cholesterol 69 05/02/2022 05:22 AM    LDL, calculated 77.6 05/02/2022 05:22 AM    Triglyceride 72 05/02/2022 05:22 AM    CHOL/HDL Ratio 2.3 05/02/2022 05:22 AM     Lab Results   Component Value Date/Time    Glucose  fasting 10/30/2014 09:12 AM    Glucose POC 04 fasting 10/30/2014 08:56 AM     Lab Results   Component Value Date/Time    Color YELLOW/STRAW 05/02/2022 05:22 AM    Appearance CLEAR 05/02/2022 05:22 AM    Specific gravity 1.010 05/02/2022 05:22 AM    pH (UA) 6.0 05/02/2022 05:22 AM    Protein Negative 05/02/2022 05:22 AM    Glucose Negative 05/02/2022 05:22 AM    Ketone TRACE (A) 05/02/2022 05:22 AM    Bilirubin Negative 05/02/2022 05:22 AM    Urobilinogen 1.0 05/02/2022 05:22 AM    Nitrites Positive (A) 05/02/2022 05:22 AM    Leukocyte Esterase SMALL (A) 05/02/2022 05:22 AM    Epithelial cells FEW 05/02/2022 05:22 AM    Bacteria 4+ (A) 05/02/2022 05:22 AM    WBC 10-20 05/02/2022 05:22 AM    RBC 0-5 05/02/2022 05:22 AM         Medications Reviewed:     Current Facility-Administered Medications   Medication Dose Route Frequency    metoprolol succinate (TOPROL-XL) XL tablet 25 mg  25 mg Oral DAILY    hydrALAZINE (APRESOLINE) 20 mg/mL injection 10 mg  10 mg IntraVENous Q6H PRN    melatonin tablet 3 mg  3 mg Oral QHS PRN    haloperidol (HALDOL) 2 mg/mL oral solution 2 mg  2 mg SubLINGual Q6H PRN    lidocaine 4 % patch 1 Patch  1 Patch TransDERmal Q24H    metoclopramide HCl (REGLAN) injection 5 mg  5 mg IntraVENous Q6H PRN    labetaloL (NORMODYNE;TRANDATE) injection 10 mg  10 mg IntraVENous Q2H PRN    sodium chloride (NS) flush 5-40 mL  5-40 mL IntraVENous Q8H    sodium chloride (NS) flush 5-40 mL  5-40 mL IntraVENous PRN    acetaminophen (TYLENOL) tablet 650 mg  650 mg Oral Q6H PRN    Or    acetaminophen (TYLENOL) suppository 650 mg  650 mg Rectal Q6H PRN    polyethylene glycol (MIRALAX) packet 17 g  17 g Oral DAILY PRN    ondansetron (ZOFRAN ODT) tablet 4 mg  4 mg Oral Q8H PRN    Or    ondansetron (ZOFRAN) injection 4 mg  4 mg IntraVENous Q6H PRN     ______________________________________________________________________  EXPECTED LENGTH OF STAY: 4d 9h  ACTUAL LENGTH OF STAY:          13           Renetta Bradley MD

## 2022-05-15 PROCEDURE — 74011250637 HC RX REV CODE- 250/637: Performed by: HOSPITALIST

## 2022-05-15 PROCEDURE — 74011000250 HC RX REV CODE- 250: Performed by: NURSE PRACTITIONER

## 2022-05-15 PROCEDURE — 65270000029 HC RM PRIVATE

## 2022-05-15 RX ADMIN — METOPROLOL SUCCINATE 25 MG: 25 TABLET, EXTENDED RELEASE ORAL at 08:33

## 2022-05-15 NOTE — PROGRESS NOTES
[facility] [facility] Adult  Hospitalist Group                                                                                          Hospitalist Progress Note  Papo Stahl MD  Answering service: [phone] OR [phone] from in house phone        Date of Service:  5/15/2022  NAME:  Henna Enriquez  :  1955  MRN:  [id number]      Admission Summary:   Per H and P \"78year-old female with PMHx of schizophrenia who presents to the ED as a transfer from an OSH ED for right-sided mixed density right parietal parenchymal hemorrhage.  Per chart review, her symptoms began yesterday morning with left-sided hemiparesis.  Family brought her to the OSH today after she became more lethargic with AMS.  Neurosurgery evaluated patient and no need for neurosurgical intervention at this time. Irma Farah was started on Cardene in the ED for hypertension and Precedex for agitation.  OSH was unable to perform CTA due to agitation.  Will attempt CTA while on Precedex.  She will be admitted to the ICU for further medical management\"     Interval history / Subjective:     No acute events overnight    Discharge plan home hospice no insurance      Assessment & Plan:     # Right parieto-occipital and right falcine hemorrhages  -. Right parieto-occipital and right falcine hemorrhages with associated edema  and mass effect and leftward midline shift are not significantly changed from  prior. There is new the evident layering intraventricular hemorrhage in the  posterior left lateral ventricular horn.  -Conservative management per NSGY  -seen by neurology   -seroquel changed to Haldol prn  -completed Keppra     #Dysphagia: comfort feeding     #HTN:  Continue metoprolol    # elevated WBC - ?  Aspiration   - resoled     # acidosis  With gap  - resolved with bicarb gtt     # UTI - off abx now      Code status:DNR  DVT prophylaxis: SCD     Anticipated Disposition: to [facility] when bed available  Anticipated Discharge: Greater than 48 hours Hospital Problems  Date Reviewed: 6/9/2015          Codes Class Noted POA    Hemorrhagic stroke [facility]) ICD-10-CM: I61.9  ICD-9-CM: 555  5/1/2022 Unknown                Review of Systems:   Review of systems not obtained due to patient factors. Vital Signs:    Last 24hrs VS reviewed since prior progress note. Most recent are:  Visit Vitals  /60 (BP 1 Location: Left upper arm, BP Patient Position: At rest;Lying)   Pulse 81   Temp 98.2 °F (36.8 °C)   Resp 16   Ht 4' 7\" (1.397 m)   Wt 54.1 kg (119 lb 4.8 oz)   SpO2 97%   BMI 27.73 kg/m²         Intake/Output Summary (Last 24 hours) at 5/15/2022 1451  Last data filed at 5/14/2022 2015  Gross per 24 hour   Intake    Output 400 ml   Net -400 ml        Physical Examination:           General : nad  Chest: normal work of breathing      Data Review:    I personally reviewed  Image and labs      Labs:     No results for input(s): WBC, HGB, HCT, PLT, HGBEXT, HCTEXT, PLTEXT, HGBEXT, HCTEXT, PLTEXT in the last 72 hours. No results for input(s): NA, K, CL, CO2, BUN, CREA, GLU, CA, MG, PHOS, URICA in the last 72 hours. No results for input(s): ALT, AP, TBIL, TBILI, TP, ALB, GLOB, GGT, AML, LPSE in the last 72 hours. No lab exists for component: SGOT, GPT, AMYP, HLPSE  No results for input(s): INR, PTP, APTT, INREXT, INREXT in the last 72 hours. No results for input(s): FE, TIBC, PSAT, FERR in the last 72 hours. No results found for: FOL, RBCF   No results for input(s): PH, PCO2, PO2 in the last 72 hours. No results for input(s): CPK, CKNDX, TROIQ in the last 72 hours.     No lab exists for component: CPKMB  Lab Results   Component Value Date/Time    Cholesterol, total 161 05/02/2022 05:22 AM    HDL Cholesterol 69 05/02/2022 05:22 AM    LDL, calculated 77.6 05/02/2022 05:22 AM    Triglyceride 72 05/02/2022 05:22 AM    CHOL/HDL Ratio 2.3 05/02/2022 05:22 AM     Lab Results   Component Value Date/Time    Glucose  fasting 10/30/2014 09:12 AM    Glucose POC 04 fasting 10/30/2014 08:56 AM     Lab Results   Component Value Date/Time    Color YELLOW/STRAW 05/02/2022 05:22 AM    Appearance CLEAR 05/02/2022 05:22 AM    Specific gravity 1.010 05/02/2022 05:22 AM    pH (UA) 6.0 05/02/2022 05:22 AM    Protein Negative 05/02/2022 05:22 AM    Glucose Negative 05/02/2022 05:22 AM    Ketone TRACE (A) 05/02/2022 05:22 AM    Bilirubin Negative 05/02/2022 05:22 AM    Urobilinogen 1.0 05/02/2022 05:22 AM    Nitrites Positive (A) 05/02/2022 05:22 AM    Leukocyte Esterase SMALL (A) 05/02/2022 05:22 AM    Epithelial cells FEW 05/02/2022 05:22 AM    Bacteria 4+ (A) 05/02/2022 05:22 AM    WBC 10-20 05/02/2022 05:22 AM    RBC 0-5 05/02/2022 05:22 AM         Medications Reviewed:     Current Facility-Administered Medications   Medication Dose Route Frequency    metoprolol succinate (TOPROL-XL) XL tablet 25 mg  25 mg Oral DAILY    hydrALAZINE (APRESOLINE) 20 mg/mL injection 10 mg  10 mg IntraVENous Q6H PRN    melatonin tablet 3 mg  3 mg Oral QHS PRN    haloperidol (HALDOL) 2 mg/mL oral solution 2 mg  2 mg SubLINGual Q6H PRN    lidocaine 4 % patch 1 Patch  1 Patch TransDERmal Q24H    metoclopramide HCl (REGLAN) injection 5 mg  5 mg IntraVENous Q6H PRN    labetaloL (NORMODYNE;TRANDATE) injection 10 mg  10 mg IntraVENous Q2H PRN    acetaminophen (TYLENOL) tablet 650 mg  650 mg Oral Q6H PRN    Or    acetaminophen (TYLENOL) suppository 650 mg  650 mg Rectal Q6H PRN    polyethylene glycol (MIRALAX) packet 17 g  17 g Oral DAILY PRN    ondansetron (ZOFRAN ODT) tablet 4 mg  4 mg Oral Q8H PRN    Or    ondansetron (ZOFRAN) injection 4 mg  4 mg IntraVENous Q6H PRN     ______________________________________________________________________  EXPECTED LENGTH OF STAY: 4d 9h  ACTUAL LENGTH OF STAY:          14           Luna Givens MD

## 2022-05-15 NOTE — PROGRESS NOTES
Problem: Pressure Injury - Risk of  Goal: *Prevention of pressure injury  Description: Document Eugene Scale and appropriate interventions in the flowsheet. Outcome: Progressing Towards Goal  Note: Pressure Injury Interventions:  Sensory Interventions: Float heels    Moisture Interventions: Assess need for specialty bed    Activity Interventions: PT/OT evaluation,Pressure redistribution bed/mattress(bed type)    Mobility Interventions: HOB 30 degrees or less,Float heels    Nutrition Interventions: Document food/fluid/supplement intake    Friction and Shear Interventions: HOB 30 degrees or less,Lift sheet      Wound care consult placed for Left excoriation to buttocks and abrasions on back. Zinc cream applied, did not apply foam dressing because the areas on the back would be irritated by the tape on the mepilex. Problem: Nutrition Deficit  Goal: *Optimize nutritional status  Outcome: Progressing Towards Goal       Problem: Falls - Risk of  Goal: *Absence of Falls  Description: Document Marciana Distance Fall Risk and appropriate interventions in the flowsheet.   Outcome: Progressing Towards Goal  Note: Fall Risk Interventions:  Mobility Interventions: Communicate number of staff needed for ambulation/transfer,Bed/chair exit alarm    Mentation Interventions: Bed/chair exit alarm    Medication Interventions: Patient to call before getting OOB,Bed/chair exit alarm    Elimination Interventions: Call light in reach,Bed/chair exit alarm    History of Falls Interventions: Bed/chair exit alarm

## 2022-05-16 PROCEDURE — 77030038269 HC DRN EXT URIN PURWCK BARD -A

## 2022-05-16 PROCEDURE — 74011250637 HC RX REV CODE- 250/637: Performed by: HOSPITALIST

## 2022-05-16 PROCEDURE — 65270000029 HC RM PRIVATE

## 2022-05-16 PROCEDURE — 74011000250 HC RX REV CODE- 250: Performed by: NURSE PRACTITIONER

## 2022-05-16 RX ADMIN — METOPROLOL SUCCINATE 25 MG: 25 TABLET, EXTENDED RELEASE ORAL at 08:12

## 2022-05-16 NOTE — PROGRESS NOTES
[facility] [facility] Adult  Hospitalist Group                                                                                          Hospitalist Progress Note  Mili Henley MD  Answering service: [phone] OR [phone] from in house phone        Date of Service:  2022  NAME:  Donald Vyas  :  1955  MRN:  [id number]      Admission Summary:   Per H and P \"78year-old female with PMHx of schizophrenia who presents to the ED as a transfer from an OSH ED for right-sided mixed density right parietal parenchymal hemorrhage.  Per chart review, her symptoms began yesterday morning with left-sided hemiparesis.  Family brought her to the OSH today after she became more lethargic with AMS.  Neurosurgery evaluated patient and no need for neurosurgical intervention at this time. Kamron Khan was started on Cardene in the ED for hypertension and Precedex for agitation.  OSH was unable to perform CTA due to agitation.  Will attempt CTA while on Precedex.  She will be admitted to the ICU for further medical management\"     Interval history / Subjective:     No acute events overnight    Discharge plan home hospice no insurance      Assessment & Plan:     # Right parieto-occipital and right falcine hemorrhages  -. Right parieto-occipital and right falcine hemorrhages with associated edema  and mass effect and leftward midline shift are not significantly changed from  prior. There is new the evident layering intraventricular hemorrhage in the  posterior left lateral ventricular horn.  -Conservative management per NSGY  -seen by neurology   -seroquel changed to Haldol prn  -completed Keppra     #Dysphagia: comfort feeding     #HTN:  Continue metoprolol    # elevated WBC - ?  Aspiration   - resoled     # acidosis  With gap  - resolved with bicarb gtt     # UTI - off abx now      Code status:DNR  DVT prophylaxis: SCD     Anticipated Disposition: to [facility] when bed available  Anticipated Discharge: Greater than 48 hours Hospital Problems  Date Reviewed: 6/9/2015          Codes Class Noted POA    Hemorrhagic stroke [facility]) ICD-10-CM: I61.9  ICD-9-CM: 513  5/1/2022 Unknown                Review of Systems:   Review of systems not obtained due to patient factors. Vital Signs:    Last 24hrs VS reviewed since prior progress note. Most recent are:  Visit Vitals  /60 (BP 1 Location: Right upper arm, BP Patient Position: At rest)   Pulse 79   Temp 98.4 °F (36.9 °C)   Resp 16   Ht 4' 7\" (1.397 m)   Wt 54.1 kg (119 lb 4.8 oz)   SpO2 100%   BMI 27.73 kg/m²       No intake or output data in the 24 hours ending 05/16/22 1527     Physical Examination:           General : nad  Chest: normal work of breathing      Data Review:    I personally reviewed  Image and labs      Labs:     No results for input(s): WBC, HGB, HCT, PLT, HGBEXT, HCTEXT, PLTEXT, HGBEXT, HCTEXT, PLTEXT in the last 72 hours. No results for input(s): NA, K, CL, CO2, BUN, CREA, GLU, CA, MG, PHOS, URICA in the last 72 hours. No results for input(s): ALT, AP, TBIL, TBILI, TP, ALB, GLOB, GGT, AML, LPSE in the last 72 hours. No lab exists for component: SGOT, GPT, AMYP, HLPSE  No results for input(s): INR, PTP, APTT, INREXT, INREXT in the last 72 hours. No results for input(s): FE, TIBC, PSAT, FERR in the last 72 hours. No results found for: FOL, RBCF   No results for input(s): PH, PCO2, PO2 in the last 72 hours. No results for input(s): CPK, CKNDX, TROIQ in the last 72 hours.     No lab exists for component: CPKMB  Lab Results   Component Value Date/Time    Cholesterol, total 161 05/02/2022 05:22 AM    HDL Cholesterol 69 05/02/2022 05:22 AM    LDL, calculated 77.6 05/02/2022 05:22 AM    Triglyceride 72 05/02/2022 05:22 AM    CHOL/HDL Ratio 2.3 05/02/2022 05:22 AM     Lab Results   Component Value Date/Time    Glucose  fasting 10/30/2014 09:12 AM    Glucose POC 04 fasting 10/30/2014 08:56 AM     Lab Results   Component Value Date/Time    Color YELLOW/STRAW 05/02/2022 05:22 AM    Appearance CLEAR 05/02/2022 05:22 AM    Specific gravity 1.010 05/02/2022 05:22 AM    pH (UA) 6.0 05/02/2022 05:22 AM    Protein Negative 05/02/2022 05:22 AM    Glucose Negative 05/02/2022 05:22 AM    Ketone TRACE (A) 05/02/2022 05:22 AM    Bilirubin Negative 05/02/2022 05:22 AM    Urobilinogen 1.0 05/02/2022 05:22 AM    Nitrites Positive (A) 05/02/2022 05:22 AM    Leukocyte Esterase SMALL (A) 05/02/2022 05:22 AM    Epithelial cells FEW 05/02/2022 05:22 AM    Bacteria 4+ (A) 05/02/2022 05:22 AM    WBC 10-20 05/02/2022 05:22 AM    RBC 0-5 05/02/2022 05:22 AM         Medications Reviewed:     Current Facility-Administered Medications   Medication Dose Route Frequency    metoprolol succinate (TOPROL-XL) XL tablet 25 mg  25 mg Oral DAILY    hydrALAZINE (APRESOLINE) 20 mg/mL injection 10 mg  10 mg IntraVENous Q6H PRN    melatonin tablet 3 mg  3 mg Oral QHS PRN    haloperidol (HALDOL) 2 mg/mL oral solution 2 mg  2 mg SubLINGual Q6H PRN    lidocaine 4 % patch 1 Patch  1 Patch TransDERmal Q24H    metoclopramide HCl (REGLAN) injection 5 mg  5 mg IntraVENous Q6H PRN    labetaloL (NORMODYNE;TRANDATE) injection 10 mg  10 mg IntraVENous Q2H PRN    acetaminophen (TYLENOL) tablet 650 mg  650 mg Oral Q6H PRN    Or    acetaminophen (TYLENOL) suppository 650 mg  650 mg Rectal Q6H PRN    polyethylene glycol (MIRALAX) packet 17 g  17 g Oral DAILY PRN    ondansetron (ZOFRAN ODT) tablet 4 mg  4 mg Oral Q8H PRN    Or    ondansetron (ZOFRAN) injection 4 mg  4 mg IntraVENous Q6H PRN     ______________________________________________________________________  EXPECTED LENGTH OF STAY: 4d 9h  ACTUAL LENGTH OF STAY:          15           Dre Ayala MD

## 2022-05-16 NOTE — WOUND CARE
WOCN Note:     New consult for left glute and back. Chart shows:  Admitted on 5.1/22  Admitted for CVA. History of schizophrenia. Assessment:   Minimal communication and reports no pain. Able to turn independently onto left side. Wearing briefs. Surface: P500 air mattress    Bilateral heels intact and without erythema. Buttocks and sacrum intact without erythema  Bilateral buttocks have linear scratch marks that appear resolving. No open areas noted. No wound care needs - will sign off.      SELAM Naik, RN, [employer]  Certified Wound, Ostomy, Continence Nurse  office [phone]  Available via [employer]

## 2022-05-16 NOTE — PROGRESS NOTES
Problem: Pressure Injury - Risk of  Goal: *Prevention of pressure injury  Description: Document Eugene Scale and appropriate interventions in the flowsheet.   Outcome: Progressing Towards Goal  Note: Pressure Injury Interventions:  Sensory Interventions: Assess changes in LOC    Moisture Interventions: Check for incontinence Q2 hours and as needed,Apply protective barrier, creams and emollients,Internal/External urinary devices    Activity Interventions: PT/OT evaluation    Mobility Interventions: Assess need for specialty bed    Nutrition Interventions: Offer support with meals,snacks and hydration    Friction and Shear Interventions: HOB 30 degrees or less

## 2022-05-16 NOTE — HOSPICE
Hospice Liaison Note:    Chart reviewed for updates in plan of care. Pt able to tolerate oral medications and some food without difficulty. Pt has not been symptomatic pain, restlessness or dyspnea. Patient is not meeting GIP inpatient criteria for Hospice services. Plan: Continue to be available for patient/family Hospice education support as requested. Please call Hospice team to offer support for patient, family or staff.     Thank you for your coordination with the hospice plan of care      Chantal Peace RN, [place] [address] Nurse Liaison  [phone] [place]  [phone] Office

## 2022-05-17 ENCOUNTER — HOSPITAL ENCOUNTER (INPATIENT)
Age: 67
LOS: 36 days | Discharge: HOME | DRG: 064 | End: 2022-06-22
Attending: STUDENT IN AN ORGANIZED HEALTH CARE EDUCATION/TRAINING PROGRAM | Admitting: STUDENT IN AN ORGANIZED HEALTH CARE EDUCATION/TRAINING PROGRAM

## 2022-05-17 VITALS
WEIGHT: 119.3 LBS | HEART RATE: 73 BPM | OXYGEN SATURATION: 97 % | RESPIRATION RATE: 17 BRPM | HEIGHT: 55 IN | SYSTOLIC BLOOD PRESSURE: 112 MMHG | DIASTOLIC BLOOD PRESSURE: 69 MMHG | TEMPERATURE: 98.4 F | BODY MASS INDEX: 27.61 KG/M2

## 2022-05-17 DIAGNOSIS — R53.1 LEFT-SIDED WEAKNESS: Primary | ICD-10-CM

## 2022-05-17 PROCEDURE — 65270000032 HC RM SEMIPRIVATE

## 2022-05-17 PROCEDURE — 74011000250 HC RX REV CODE- 250: Performed by: STUDENT IN AN ORGANIZED HEALTH CARE EDUCATION/TRAINING PROGRAM

## 2022-05-17 PROCEDURE — 74011250637 HC RX REV CODE- 250/637: Performed by: HOSPITALIST

## 2022-05-17 PROCEDURE — 74011250637 HC RX REV CODE- 250/637: Performed by: STUDENT IN AN ORGANIZED HEALTH CARE EDUCATION/TRAINING PROGRAM

## 2022-05-17 RX ORDER — LABETALOL HYDROCHLORIDE 5 MG/ML
10 INJECTION, SOLUTION INTRAVENOUS
Status: DISCONTINUED | OUTPATIENT
Start: 2022-05-17 | End: 2022-05-17

## 2022-05-17 RX ORDER — HYDRALAZINE HYDROCHLORIDE 20 MG/ML
10 INJECTION INTRAMUSCULAR; INTRAVENOUS
Status: CANCELLED | OUTPATIENT
Start: 2022-05-17

## 2022-05-17 RX ORDER — HALOPERIDOL 2 MG/ML
2 SOLUTION ORAL
Status: CANCELLED | OUTPATIENT
Start: 2022-05-17

## 2022-05-17 RX ORDER — HYDRALAZINE HYDROCHLORIDE 20 MG/ML
10 INJECTION INTRAMUSCULAR; INTRAVENOUS
Status: DISCONTINUED | OUTPATIENT
Start: 2022-05-17 | End: 2022-05-17

## 2022-05-17 RX ORDER — ACETAMINOPHEN 325 MG/1
650 TABLET ORAL
Status: CANCELLED | OUTPATIENT
Start: 2022-05-17

## 2022-05-17 RX ORDER — METOPROLOL SUCCINATE 25 MG/1
25 TABLET, EXTENDED RELEASE ORAL DAILY
Status: CANCELLED | OUTPATIENT
Start: 2022-05-18

## 2022-05-17 RX ORDER — LIDOCAINE 4 G/100G
1 PATCH TOPICAL EVERY 24 HOURS
Status: CANCELLED | OUTPATIENT
Start: 2022-05-17

## 2022-05-17 RX ORDER — POLYETHYLENE GLYCOL 3350 17 G/17G
17 POWDER, FOR SOLUTION ORAL DAILY PRN
Status: DISCONTINUED | OUTPATIENT
Start: 2022-05-17 | End: 2022-06-22 | Stop reason: HOSPADM

## 2022-05-17 RX ORDER — ONDANSETRON 4 MG/1
4 TABLET, ORALLY DISINTEGRATING ORAL
Status: DISCONTINUED | OUTPATIENT
Start: 2022-05-17 | End: 2022-06-22 | Stop reason: HOSPADM

## 2022-05-17 RX ORDER — ACETAMINOPHEN 650 MG/1
650 SUPPOSITORY RECTAL
Status: DISCONTINUED | OUTPATIENT
Start: 2022-05-17 | End: 2022-06-22 | Stop reason: HOSPADM

## 2022-05-17 RX ORDER — LABETALOL HYDROCHLORIDE 5 MG/ML
10 INJECTION, SOLUTION INTRAVENOUS
Status: CANCELLED | OUTPATIENT
Start: 2022-05-17

## 2022-05-17 RX ORDER — LANOLIN ALCOHOL/MO/W.PET/CERES
3 CREAM (GRAM) TOPICAL
Status: DISCONTINUED | OUTPATIENT
Start: 2022-05-17 | End: 2022-06-22 | Stop reason: HOSPADM

## 2022-05-17 RX ORDER — LANOLIN ALCOHOL/MO/W.PET/CERES
3 CREAM (GRAM) TOPICAL
Qty: 30 TABLET | Refills: 0 | Status: ON HOLD
Start: 2022-05-17 | End: 2022-06-21 | Stop reason: SDUPTHER

## 2022-05-17 RX ORDER — ONDANSETRON 4 MG/1
4 TABLET, ORALLY DISINTEGRATING ORAL
Status: CANCELLED | OUTPATIENT
Start: 2022-05-17

## 2022-05-17 RX ORDER — METOCLOPRAMIDE HYDROCHLORIDE 5 MG/ML
5 INJECTION INTRAMUSCULAR; INTRAVENOUS
Status: DISCONTINUED | OUTPATIENT
Start: 2022-05-17 | End: 2022-05-17

## 2022-05-17 RX ORDER — LANOLIN ALCOHOL/MO/W.PET/CERES
3 CREAM (GRAM) TOPICAL
Status: CANCELLED | OUTPATIENT
Start: 2022-05-17

## 2022-05-17 RX ORDER — METOCLOPRAMIDE HYDROCHLORIDE 5 MG/ML
5 INJECTION INTRAMUSCULAR; INTRAVENOUS
Status: CANCELLED | OUTPATIENT
Start: 2022-05-17

## 2022-05-17 RX ORDER — ONDANSETRON 2 MG/ML
4 INJECTION INTRAMUSCULAR; INTRAVENOUS
Status: CANCELLED | OUTPATIENT
Start: 2022-05-17

## 2022-05-17 RX ORDER — HALOPERIDOL 2 MG/ML
2 SOLUTION ORAL
Qty: 30 ML | Refills: 0 | Status: SHIPPED
Start: 2022-05-17 | End: 2022-06-22

## 2022-05-17 RX ORDER — ACETAMINOPHEN 650 MG/1
650 SUPPOSITORY RECTAL
Status: CANCELLED | OUTPATIENT
Start: 2022-05-17

## 2022-05-17 RX ORDER — MORPHINE SULFATE 20 MG/ML
5 SOLUTION ORAL
Status: DISCONTINUED | OUTPATIENT
Start: 2022-05-17 | End: 2022-05-20

## 2022-05-17 RX ORDER — LIDOCAINE 4 G/100G
1 PATCH TOPICAL EVERY 24 HOURS
Status: DISCONTINUED | OUTPATIENT
Start: 2022-05-17 | End: 2022-06-22 | Stop reason: HOSPADM

## 2022-05-17 RX ORDER — METOPROLOL SUCCINATE 25 MG/1
25 TABLET, EXTENDED RELEASE ORAL DAILY
Status: DISCONTINUED | OUTPATIENT
Start: 2022-05-18 | End: 2022-06-09

## 2022-05-17 RX ORDER — ONDANSETRON 2 MG/ML
4 INJECTION INTRAMUSCULAR; INTRAVENOUS
Status: DISCONTINUED | OUTPATIENT
Start: 2022-05-17 | End: 2022-06-22 | Stop reason: HOSPADM

## 2022-05-17 RX ORDER — POLYETHYLENE GLYCOL 3350 17 G/17G
17 POWDER, FOR SOLUTION ORAL DAILY PRN
Status: CANCELLED | OUTPATIENT
Start: 2022-05-17

## 2022-05-17 RX ORDER — METOPROLOL SUCCINATE 25 MG/1
25 TABLET, EXTENDED RELEASE ORAL DAILY
Qty: 30 TABLET | Refills: 0 | Status: SHIPPED
Start: 2022-05-18 | End: 2022-06-22

## 2022-05-17 RX ORDER — ACETAMINOPHEN 325 MG/1
650 TABLET ORAL
Status: DISCONTINUED | OUTPATIENT
Start: 2022-05-17 | End: 2022-06-22 | Stop reason: HOSPADM

## 2022-05-17 RX ORDER — HALOPERIDOL 2 MG/ML
2 SOLUTION ORAL
Status: DISCONTINUED | OUTPATIENT
Start: 2022-05-17 | End: 2022-06-22 | Stop reason: HOSPADM

## 2022-05-17 RX ADMIN — METOPROLOL SUCCINATE 25 MG: 25 TABLET, EXTENDED RELEASE ORAL at 08:17

## 2022-05-17 RX ADMIN — MORPHINE SULFATE 5 MG: 10 SOLUTION ORAL at 18:27

## 2022-05-17 NOTE — PROGRESS NOTES
Problem: Pressure Injury - Risk of  Goal: *Prevention of pressure injury  Description: Document Eugene Scale and appropriate interventions in the flowsheet.   Outcome: Progressing Towards Goal  Note: Pressure Injury Interventions:  Sensory Interventions: Assess changes in LOC,Assess need for specialty bed,Check visual cues for pain,Discuss PT/OT consult with provider,Float heels,Keep linens dry and wrinkle-free,Maintain/enhance activity level    Moisture Interventions: Absorbent underpads,Apply protective barrier, creams and emollients,Check for incontinence Q2 hours and as needed,Internal/External urinary devices    Activity Interventions: Increase time out of bed    Mobility Interventions: Assess need for specialty bed    Nutrition Interventions: Offer support with meals,snacks and hydration    Friction and Shear Interventions: HOB 30 degrees or less

## 2022-05-17 NOTE — PROGRESS NOTES
RUR 12 %       Transitions of Care Plan-      Transfer to [facility] when accepted and bed available- medically accepted- [facility] to follow for GIP level of Care for [facility] transfer. MATTHIAS spoke with son in law- Faby White- (speaks Daniella Srinivasan)  [phone], the family is in agreement with [facility] transfer when bed available. The family is not able to care for the patient in the home. Other contact- Daughter- Radha Altamirano.      MATTHIAS spoke with Ernestine with  Hospice to clarify the plan. The patient is only eligible for emergency Medicaid. The patient does not qualify  for LTC Medicaid benefits. The patient is a non citizen.       [employer] screened the patient on 5/12 #[id number]    Will continue to follow.      CARLY Haile

## 2022-05-17 NOTE — HOSPICE
Hospice Liaison Note:    Chart reviewed for updates in plan of care. Pt able to tolerate oral medications and some food without difficulty. Pt has not been symptomatic pain, restlessness or dyspnea. Patient is not meeting GIP inpatient criteria for Hospice services. Noted patient has been transferred to [facility]. Plan: Continue to be available for patient/family Hospice education support as requested. Please call Hospice team to offer support for patient, family or staff.     Thank you for your coordination with the hospice plan of care      Rudy Garcia RN, [place] [address] Nurse Liaison  [phone] [place]  [phone] Office

## 2022-05-17 NOTE — DISCHARGE SUMMARY
Discharge Summary     Patient: Chadd Macdonald MRN: [id number]  SSN: [id number]    YOB: 1955  Age: 78 y.o. Sex: female       Admit Date: 5/1/2022    Discharge Date: 5/17/2022      Admission Diagnoses: Hemorrhagic stroke [facility]) [I61.9]    Discharge Diagnoses:   Problem List as of 5/17/2022 Date Reviewed: 6/9/2015          Codes Class Noted - Resolved    Hemorrhagic stroke [facility]) ICD-10-CM: I61.9  ICD-9-CM: 061  5/1/2022 - Present        Schizoaffective disorder ([facility] [address] ICD-10-CM: F25.9  ICD-9-CM: 295.70  1/15/2015 - Present               Discharge Condition: Stable    Hospital Course: 69-year-old female with PMHx of schizophrenia who presents to the ED as a transfer from an OSH ED for right-sided mixed density right parietal parenchymal hemorrhage.      Right parieto-occipital and right falcine hemorrhages  -Right parieto-occipital and right falcine hemorrhages with associated edema and mass effect and leftward midline shift are not significantly changed from prior. There is new the evident layering intraventricular hemorrhage in the posterior left lateral ventricular horn.  -conservative management per NSGY  -seen by neurology   -seroquel changed to Haldol prn  -completed Keppra  -decision made for hospice, the family is not able to care for her at home, so she is transferred to [facility]     Dysphagia: comfort feeding     HTN:  Continue metoprolol     UTI - off abx now     Significant Diagnostic Studies: see above    Disposition: [facility] with hospice    No acute events overnight. The patient is calm and in no distress. Discharge Medications:   Current Discharge Medication List      START taking these medications    Details   haloperidol (HALDOL) 2 mg/mL oral concentrate Take 1 mL by mouth every six (6) hours as needed for Psychosis for up to 30 days. Qty: 30 mL, Refills: 0      melatonin 3 mg tablet Take 1 Tablet by mouth nightly as needed for Insomnia.   Qty: 30 Tablet, Refills: 0      metoprolol succinate (TOPROL-XL) 25 mg XL tablet Take 1 Tablet by mouth daily. Qty: 30 Tablet, Refills: 0         STOP taking these medications       naproxen (NAPROSYN) 500 mg tablet Comments:   Reason for Stopping:               Signed By: Brody Craig MD     May 17, 2022      Greater than 30 minutes spent on discharge management.

## 2022-05-17 NOTE — PROGRESS NOTES
TRANSFER - OUT REPORT:    Verbal report given to [facility] RN(name) on Efraín  being transferred to [facility] for routine progression of care       Report consisted of patients Situation, Background, Assessment and   Recommendations(SBAR). Information from the following report(s) SBAR, Kardex and MAR was reviewed with the receiving nurse. Lines:       Opportunity for questions and clarification was provided.       Patient transported with:   JESUS

## 2022-05-17 NOTE — PROGRESS NOTES
AVERY   RUR 9 %     PLEASE NOTE--Encounter / Re-Admission Within 30 Days  This patient has had another encounter or admission within the last 30 days. Planned discharged from [address] [place] [address] and Planned Readmit to [address] [place] [id number] for continue care and transition   ;    Readmission Assessment  Number of days since last admission?:  (Planned Discharged From [address] [place] [address] with Planned Admit to [address] [place] [id number] for Continue Care and transition)  Previous disposition: Other (comment) (NA)  Who is being interviewed?:  (NA)  What was the patient's/caregiver's perception as to why they think they needed to return back to the hospital?: Other (Comment) (NA)  Did you visit your Primary Care Physician after you left the hospital, before you returned this time?:  (NA)  Did you see a specialist, such as Cardiac, Pulmonary, Orthopedic Physician, etc. after you left the hospital?:  (NA)  Who advised the patient to return to the hospital?: Other (Comment) (NA)  Does the patient report anything that got in the way of taking their medications? :  (NA)  In our efforts to provide the best possible care to you and others like you, can you think of anything that we could have done to help you after you left the hospital the first time, so that you might not have needed to return so soon?: Other (Comment) (NA)    [facility] EARNEST RN   [phone]

## 2022-05-17 NOTE — H&P
Hospitalist Admission Note    NAME: Aurea Moreira   :  1955   MRN:  [id number]   Room Number: 077/03  @ [facility]     Date/Time:  2022 5:23 PM    Patient PCP: None    Please note that this dictation was completed with [employer], the computer voice recognition software. Quite often unanticipated grammatical, syntax, homophones, and other interpretive errors are inadvertently transcribed by the computer software. Please disregard these errors. Please excuse any errors that have escaped final proofreading.  ______________________________________________________________________  Given the patient's current clinical presentation, I have a high level of concern for decompensation if discharged from the emergency department. Complex decision making was performed, which includes reviewing the patient's available past medical records, laboratory results, and x-ray films. My assessment of this patient's clinical condition and my plan of care is as follows. Assessment / Plan: Active Problems:    Left-sided weakness (2022)      #Left sided weakness due to   #Right parietal occipital and right falcine hemorrhages POA  #Intraparenchymal hemorrhage with edema and mass-effect POA  #Mass-effect with 4 mm shift to admit for midline POA  #Hypertension  #Comfort measures POA  -Assessed by neurosurgery at outside hospital, no intervention recommended  -Assessed by neurology at outside hospital  -Assessed by palliative care medicine and patient made comfortable per family request in accordance patient wishes        -Haldol as needed for agitation  -Comfort measures only  -Morphine as needed for pain  -Continue metoprolol  -Lidocaine patch  -Hospice consulted        There is no height or weight on file to calculate BMI.   Code Status: DNR    Surrogate Decision Maker:    DVT Prophylaxis: Comfort measures   GI Prophylaxis: not indicated        Subjective:   CHIEF COMPLAINT: Transfer from outside hospital for comfort measures ,  was used    HISTORY OF PRESENT ILLNESS:     Theodore Llamas is a 78 y.o.  female with PMH of above-mentioned problems who presents to [facility] from outside hospital for comfort measures. Patient presented to our hospital for acute hemorrhage with left side shift. Patient was asked by neurology and neurosurgery with no acute surgical intervention and conservative management. Patient family very palliative care medicine of the hospital patient was made comfort care. Patient does not have a payer source for insurance transfer to [facility] till then       Patient is complaining of belly pain leg pain and back pain. We were asked to admit for work up and evaluation of the above problems. Past Medical History:   Diagnosis Date    Psychotic disorder     concern for schizoaffective disorder        Past Surgical History:   Procedure Laterality Date    HX TUBAL LIGATION         Social History     Tobacco Use    Smoking status: Never Smoker    Smokeless tobacco: Not on file   Substance Use Topics    Alcohol use: No        Family History   Problem Relation Age of Onset    Diabetes Neg Hx     Heart Disease Neg Hx     Psychiatric Disorder Neg Hx      No Known Allergies     Prior to Admission medications    Medication Sig Start Date End Date Taking? Authorizing Provider   haloperidol (HALDOL) 2 mg/mL oral concentrate Take 1 mL by mouth every six (6) hours as needed for Psychosis for up to 30 days. 5/17/22 6/16/22  Chely Mccord MD   melatonin 3 mg tablet Take 1 Tablet by mouth nightly as needed for Insomnia. 5/17/22   Ewa Ly MD   metoprolol succinate (TOPROL-XL) 25 mg XL tablet Take 1 Tablet by mouth daily. 5/18/22   Ewa Ly MD       REVIEW OF SYSTEMS:     I am not able to complete the review of systems because:    The patient is intubated and sedated    The patient has altered mental status due to his acute medical problems    The patient has baseline aphasia from prior stroke(s)    The patient has baseline dementia and is not reliable historian    The patient is in acute medical distress and unable to provide information           Total of 12 systems reviewed as follows:       POSITIVE= underlined text  Negative = text not underlined  General:  fever, chills, sweats, generalized weakness, weight loss/gain,      loss of appetite   Eyes:    blurred vision, eye pain, loss of vision, double vision  ENT:    rhinorrhea, pharyngitis   Respiratory:   cough, sputum production, SOB, BERNAL, wheezing, pleuritic pain   Cardiology:   chest pain, palpitations, orthopnea, PND, edema, syncope   Gastrointestinal:  abdominal pain , N/V, diarrhea, dysphagia, constipation, bleeding   Genitourinary:  frequency, urgency, dysuria, hematuria, incontinence   Muskuloskeletal :  arthralgia, myalgia, back pain  Hematology:  easy bruising, nose or gum bleeding, lymphadenopathy   Dermatological: rash, ulceration, pruritis, color change / jaundice  Endocrine:   hot flashes or polydipsia   Neurological:  headache, dizziness, confusion, focal weakness, paresthesia,     Speech difficulties, memory loss, gait difficulty  Psychological: Feelings of anxiety, depression, agitation    Objective:   VITALS:    There were no vitals taken for this visit. PHYSICAL EXAM:    General:    Alert, cooperative, no distress, appears stated age. HEENT: Atraumatic, anicteric sclerae, pink conjunctivae     No oral ulcers, mucosa moist, throat clear, dentition fair  Neck:  Supple, symmetrical,  thyroid: non tender  Lungs:   Clear to auscultation bilaterally. No Wheezing or Rhonchi. No rales. Chest wall:  No tenderness  No Accessory muscle use. Heart:   Regular  rhythm,  No  murmur   No edema  Abdomen:   Soft, non-tender. Not distended. Bowel sounds normal  Extremities: No cyanosis.   No clubbing,      Skin turgor normal, Capillary refill normal, Radial dial pulse 2+  Skin:     Not pale. Not Jaundiced  No rashes   Psych:  . Not depressed. Not anxious or agitated. Neurologic: AO x2, LUE and LLE 1/5 ,     ______________________________________________________________________    Care Plan discussed with:  Patient/Family    Expected  Disposition:  SNF/LTC  ________________________________________________________________________  TOTAL TIME:  54 Minutes    Critical Care Provided     Minutes non procedure based      Comments    x Reviewed previous records   >50% of visit spent in counseling and coordination of care x Discussion with patient and/or family and questions answered       ________________________________________________________________________  Signed: Shima Valencia MD    Procedures: see electronic medical records for all procedures/Xrays and details which were not copied into this note but were reviewed prior to creation of Plan. LAB DATA REVIEWED:    No results found for this or any previous visit (from the past 24 hour(s)).

## 2022-05-17 NOTE — PROGRESS NOTES
1638- reports received from rn sharee. Patient in transit now. 1735- patient presented to floor via ems.  set up for patient. Patient is [facility] provided with amplifier. Patient was incontinent of urine cleaned up, bathed and diaper changed. Physical assessment completed    1830- patient fed.

## 2022-05-17 NOTE — PROGRESS NOTES
Problem: Pressure Injury - Risk of  Goal: *Prevention of pressure injury  Description: Document Eugene Scale and appropriate interventions in the flowsheet. Outcome: Progressing Towards Goal  Note: Pressure Injury Interventions:  Sensory Interventions: Assess changes in LOC,Check visual cues for pain,Keep linens dry and wrinkle-free,Minimize linen layers    Moisture Interventions: Absorbent underpads,Check for incontinence Q2 hours and as needed,Internal/External urinary devices    Activity Interventions: Increase time out of bed,Pressure redistribution bed/mattress(bed type),PT/OT evaluation    Mobility Interventions: Assess need for specialty bed,Pressure redistribution bed/mattress (bed type)    Nutrition Interventions: Offer support with meals,snacks and hydration    Friction and Shear Interventions: HOB 30 degrees or less,Lift team/patient mobility team,Minimize layers                Problem: Falls - Risk of  Goal: *Absence of Falls  Description: Document Lc Fall Risk and appropriate interventions in the flowsheet.   Outcome: Progressing Towards Goal  Note: Fall Risk Interventions:  Mobility Interventions: Communicate number of staff needed for ambulation/transfer    Mentation Interventions: Bed/chair exit alarm,Door open when patient unattended    Medication Interventions: Bed/chair exit alarm    Elimination Interventions: Bed/chair exit alarm,Toileting schedule/hourly rounds    History of Falls Interventions: Bed/chair exit alarm

## 2022-05-18 PROCEDURE — 74011000250 HC RX REV CODE- 250: Performed by: STUDENT IN AN ORGANIZED HEALTH CARE EDUCATION/TRAINING PROGRAM

## 2022-05-18 PROCEDURE — 74011250637 HC RX REV CODE- 250/637: Performed by: STUDENT IN AN ORGANIZED HEALTH CARE EDUCATION/TRAINING PROGRAM

## 2022-05-18 PROCEDURE — 65270000032 HC RM SEMIPRIVATE

## 2022-05-18 RX ADMIN — MORPHINE SULFATE 5 MG: 10 SOLUTION ORAL at 13:28

## 2022-05-18 NOTE — PROGRESS NOTES
Hospitalist Progress Note    NAME: Kimberly Gross   :  1955   MRN:  [id number]   Room Number:  883/24  @ [facility]       Interim Hospital Summary: 78 y.o. 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  Intraparenchymal hemorrhage with edema and mass-effect POA  Mass-effect with 4 mm shift to admit for midline POA  Hypertension  Comfort measures POA  -Assessed by neurosurgery at outside hospital, no intervention recommended  -Assessed by neurology at outside hospital  -Assessed by palliative care medicine and patient made comfortable per family request in accordance patient wishes           -Haldol as needed for agitation  -Comfort measures only  -Morphine as needed for pain  -Continue metoprolol  -Lidocaine patch  -Hospice consulted           There is no height or weight on file to calculate BMI. Code Status: DNR     Surrogate Decision Maker:     DVT Prophylaxis: Comfort measures   GI Prophylaxis: not indicated       Subjective:     Chief Complaint / Reason for Physician Visit  No acute issues. Discussed with RN events overnight. Review of Systems:  No fevers, chills, appetite change, cough, sputum production, shortness of breath, dyspnea on exertion, nausea, vomitting, diarrhea, constipation, chest pain, leg edema, abdominal pain, joint pain, rash, itching. Objective:     VITALS:   Last 24hrs VS reviewed since prior progress note. Most recent are:  Patient Vitals for the past 24 hrs:   Temp Pulse Resp BP SpO2   22 0800 98.9 °F (37.2 °C) 78 19 117/62 98 %   22 1934 98.3 °F (36.8 °C) 99 16 (!) 153/82 99 %   22 1757 98.3 °F (36.8 °C) (!) 105 16 (!) 141/83 98 %     No intake or output data in the 24 hours ending 22 1218     PHYSICAL EXAM:  General: WD, WN. Alert, cooperative, no acute distress    EENT:  EOMI. Anicteric sclerae.  MMM  Resp:  CTA bilaterally, no wheezing or rales. No accessory muscle use  CV:  Regular  rhythm,  normal S1/S2, no murmurs rubs gallops, No edema  GI:  Soft, Non distended, Non tender. +Bowel sounds  Neurologic:  Alert and oriented X self  Psych:    Not anxious nor agitated  Skin:  No rashes. No jaundice    Reviewed most current lab test results and cultures  YES  Reviewed most current radiology test results   YES  Review and summation of old records today    NO  Reviewed patient's current orders and MAR    YES  PMH/SH reviewed - no change compared to H&P  ________________________________________________________________________  Care Plan discussed with:    Comments   Patient     Family      RN     Care Manager     Consultant                        Multidiciplinary team rounds were held today with , nursing, pharmacist and clinical coordinator. Patient's plan of care was discussed; medications were reviewed and discharge planning was addressed. ________________________________________________________________________  Total NON critical care TIME: 15   Minutes    Total CRITICAL CARE TIME Spent:   Minutes non procedure based      Comments   >50% of visit spent in counseling and coordination of care     ________________________________________________________________________  Ismael Dennison MD     Procedures: see electronic medical records for all procedures/Xrays and details which were not copied into this note but were reviewed prior to creation of Plan. LABS:  I reviewed today's most current labs and imaging studies. Pertinent labs include:  No results for input(s): WBC, HGB, HCT, PLT, HGBEXT, HCTEXT, PLTEXT in the last 72 hours. No results for input(s): NA, K, CL, CO2, GLU, BUN, CREA, CA, MG, PHOS, ALB, TBIL, TBILI, ALT, INR, INREXT in the last 72 hours.     No lab exists for component: SGOT    Signed: Ismael Dennison MD

## 2022-05-18 NOTE — PROGRESS NOTES
Problem: Falls - Risk of  Goal: *Absence of Falls  Description: Document Lisa Locket Fall Risk and appropriate interventions in the flowsheet. Outcome: Progressing Towards Goal  Note: Fall Risk Interventions:  Mobility Interventions: Communicate number of staff needed for ambulation/transfer,Utilize gait belt for transfers/ambulation    Mentation Interventions: Adequate sleep, hydration, pain control    Medication Interventions: Utilize gait belt for transfers/ambulation    Elimination Interventions: Toileting schedule/hourly rounds    History of Falls Interventions: Consult care management for discharge planning         Problem: Patient Education: Go to Patient Education Activity  Goal: Patient/Family Education  Outcome: Progressing Towards Goal     Problem: Pressure Injury - Risk of  Goal: *Prevention of pressure injury  Description: Document Eugene Scale and appropriate interventions in the flowsheet.   Outcome: Progressing Towards Goal  Note: Pressure Injury Interventions:  Sensory Interventions: Assess changes in LOC    Moisture Interventions: Absorbent underpads    Activity Interventions: Increase time out of bed,PT/OT evaluation    Mobility Interventions: HOB 30 degrees or less    Nutrition Interventions: Document food/fluid/supplement intake    Friction and Shear Interventions: Apply protective barrier, creams and emollients

## 2022-05-18 NOTE — PROGRESS NOTES
Problem: Falls - Risk of  Goal: *Absence of Falls  Description: Document Jigar Muro Fall Risk and appropriate interventions in the flowsheet. Outcome: Progressing Towards Goal  Note: Fall Risk Interventions:  Mobility Interventions: Bed/chair exit alarm,Utilize walker, cane, or other assistive device,Communicate number of staff needed for ambulation/transfer    Mentation Interventions: Adequate sleep, hydration, pain control,Bed/chair exit alarm,More frequent rounding,Reorient patient,Toileting rounds    Medication Interventions: Bed/chair exit alarm    Elimination Interventions: Bed/chair exit alarm,Call light in reach,Toilet paper/wipes in reach    History of Falls Interventions: Bed/chair exit alarm         Problem: Pressure Injury - Risk of  Goal: *Prevention of pressure injury  Description: Document Eugene Scale and appropriate interventions in the flowsheet. Outcome: Progressing Towards Goal  Note: Pressure Injury Interventions:  Sensory Interventions: Assess changes in LOC    Moisture Interventions: Absorbent underpads,Apply protective barrier, creams and emollients,Check for incontinence Q2 hours and as needed,Maintain skin hydration (lotion/cream),Minimize layers,Moisture barrier    Activity Interventions: Chair cushion,Increase time out of bed,Assess need for specialty bed    Mobility Interventions: HOB 30 degrees or less    Nutrition Interventions: Document food/fluid/supplement intake,Offer support with meals,snacks and hydration    Friction and Shear Interventions: Apply protective barrier, creams and emollients,Foam dressings/transparent film/skin sealants,HOB 30 degrees or less,Minimize layers                Problem: Pressure Injury - Risk of  Goal: *Prevention of pressure injury  Description: Document Eugene Scale and appropriate interventions in the flowsheet.   Outcome: Progressing Towards Goal  Note: Pressure Injury Interventions:  Sensory Interventions: Assess changes in LOC    Moisture Interventions: Absorbent underpads,Apply protective barrier, creams and emollients,Check for incontinence Q2 hours and as needed,Maintain skin hydration (lotion/cream),Minimize layers,Moisture barrier    Activity Interventions: Chair cushion,Increase time out of bed,Assess need for specialty bed    Mobility Interventions: HOB 30 degrees or less    Nutrition Interventions: Document food/fluid/supplement intake,Offer support with meals,snacks and hydration    Friction and Shear Interventions: Apply protective barrier, creams and emollients,Foam dressings/transparent film/skin sealants,HOB 30 degrees or less,Minimize layers                Problem: Patient Education: Go to Patient Education Activity  Goal: Patient/Family Education  Outcome: Not Progressing Towards Goal     Problem: Patient Education: Go to Patient Education Activity  Goal: Patient/Family Education  Outcome: Not Progressing Towards Goal     Problem: Patient Education: Go to Patient Education Activity  Goal: Patient/Family Education  Outcome: Not Progressing Towards Goal

## 2022-05-18 NOTE — HOSPICE
[facility] RN note:  Courtesy visit to pt who is alert, pleasantly engaged despite language barrier, in no obvious distress. [id number] to follow peripherally as clinical status evolves. Thank you for the opportunity to care for this pt and family. Please contact hospice at [phone] with any questions or concerns.

## 2022-05-19 PROCEDURE — 74011250637 HC RX REV CODE- 250/637: Performed by: STUDENT IN AN ORGANIZED HEALTH CARE EDUCATION/TRAINING PROGRAM

## 2022-05-19 PROCEDURE — 65270000032 HC RM SEMIPRIVATE

## 2022-05-19 PROCEDURE — 74011000250 HC RX REV CODE- 250: Performed by: STUDENT IN AN ORGANIZED HEALTH CARE EDUCATION/TRAINING PROGRAM

## 2022-05-19 RX ADMIN — METOPROLOL SUCCINATE 25 MG: 25 TABLET, EXTENDED RELEASE ORAL at 11:26

## 2022-05-19 RX ADMIN — ACETAMINOPHEN 650 MG: 325 TABLET ORAL at 11:27

## 2022-05-19 NOTE — PROGRESS NOTES
Hospitalist Progress Note    NAME: Nicole Godoy   :  1955   MRN:  [id number]   Room Number:  148/65  @ [facility]       Interim Hospital Summary: 78 y.o. 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  Intraparenchymal hemorrhage with edema and mass-effect POA  Mass-effect with 4 mm shift to admit for midline POA  Hypertension  Comfort measures POA  -Assessed by neurosurgery at outside hospital, no intervention recommended  -Assessed by neurology at outside hospital  -Assessed by palliative care medicine and patient made comfortable per family request in accordance patient wishes           -Haldol as needed for agitation  -Comfort measures only  -Morphine as needed for pain  -Continue metoprolol  -Lidocaine patch  -Hospice consulted           There is no height or weight on file to calculate BMI. Code Status: DNR     Surrogate Decision Maker:     DVT Prophylaxis: Comfort measures   GI Prophylaxis: not indicated       Subjective:     Chief Complaint / Reason for Physician Visit  No acute issues. Discussed with RN events overnight. Review of Systems:  No fevers, chills, appetite change, cough, sputum production, shortness of breath, dyspnea on exertion, nausea, vomitting, diarrhea, constipation, chest pain, leg edema, abdominal pain, joint pain, rash, itching. Objective:     VITALS:   Last 24hrs VS reviewed since prior progress note. Most recent are:  Patient Vitals for the past 24 hrs:   Temp Pulse Resp BP SpO2   22 0804 98.3 °F (36.8 °C) 75 18 (!) 122/54 97 %   22 1913 98.3 °F (36.8 °C) 86 18 125/89 97 %     No intake or output data in the 24 hours ending 22 1022     PHYSICAL EXAM:  General: WD, WN. Alert, cooperative, no acute distress    EENT:  EOMI. Anicteric sclerae. MMM  Resp:  CTA bilaterally, no wheezing or rales.   No accessory muscle use  CV:  Regular rhythm,  normal S1/S2, no murmurs rubs gallops, No edema  GI:  Soft, Non distended, Non tender. +Bowel sounds  Neurologic:  Alert and oriented X self  Psych:    Not anxious nor agitated  Skin:  No rashes. No jaundice    Reviewed most current lab test results and cultures  YES  Reviewed most current radiology test results   YES  Review and summation of old records today    NO  Reviewed patient's current orders and MAR    YES  PMH/SH reviewed - no change compared to H&P  ________________________________________________________________________  Care Plan discussed with:    Comments   Patient     Family      RN     Care Manager     Consultant                        Multidiciplinary team rounds were held today with , nursing, pharmacist and clinical coordinator. Patient's plan of care was discussed; medications were reviewed and discharge planning was addressed. ________________________________________________________________________  Total NON critical care TIME: 15   Minutes    Total CRITICAL CARE TIME Spent:   Minutes non procedure based      Comments   >50% of visit spent in counseling and coordination of care     ________________________________________________________________________  Sonia Cruz MD     Procedures: see electronic medical records for all procedures/Xrays and details which were not copied into this note but were reviewed prior to creation of Plan. LABS:  I reviewed today's most current labs and imaging studies. Pertinent labs include:  No results for input(s): WBC, HGB, HCT, PLT, HGBEXT, HCTEXT, PLTEXT, HGBEXT, HCTEXT, PLTEXT in the last 72 hours. No results for input(s): NA, K, CL, CO2, GLU, BUN, CREA, CA, MG, PHOS, ALB, TBIL, TBILI, ALT, INR, INREXT, INREXT in the last 72 hours.     No lab exists for component: SGOT    Signed: Sonia Cruz MD

## 2022-05-19 NOTE — PROGRESS NOTES
NUTRITION     RD PLAN OF CARE:   Pt assessed, discussed in IDRs. Tx to [facility] for comfort care; comfort measures only. Diet - Minced and Moist/comfort feedings only as awake/alert.     Pt is admitted with Hemorrhagic stroke/dysphagia POA ([facility] [address] [I61.9]. Comfort measures only POA. Hospice consulted. Aggressive nutrition intervention is not indicated at this time, nutrition to sign-off. Please re-consult Nutrition services should plan of care change.  Thank you

## 2022-05-19 NOTE — HOSPICE
[facility]  [phone]    Patient Name: Indiana Tipton  YOB: 1955  Age: 78 y.o. [address] [facility] BOB Note:      Current order for Hospice will  in 2 days. Have asked attending MD to reorder. 10:45: Call to [facility], pts DENISE who speaks limited [place]. He is aware that pt is not inpatient hospice appropriate and may benefit from PT/OT. He says they will be here ,  at 1:00pm to see patient. The family will need Language Line to get information about patient's condition. Note: Pt has been  from her  for over a year but it is not legal. He is still NOK and lives with his dtr Noemy Hartley ([phone]). Pt has been living with her dtr Amrit Veronica and DENISE Coughlin. Thank you for the opportunity to be of service to this patient.     Lori England RN [facility]  [phone]  Or Jeanne

## 2022-05-19 NOTE — PROGRESS NOTES
Hospitalist Progress Note    NAME: Eric Randall   :  1955   MRN:  [id number]   Room Number:  938/06  @ [facility]       Interim Hospital Summary: 78 y.o. 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  Intraparenchymal hemorrhage with edema and mass-effect POA  Mass-effect with 4 mm shift to admit for midline POA  Hypertension  Comfort measures POA  -Assessed by neurosurgery at outside hospital, no intervention recommended  -Assessed by neurology at outside hospital  -Assessed by palliative care medicine and patient made comfortable per family request in accordance patient wishes           -Haldol as needed for agitation  -Comfort measures only  -Morphine as needed for pain  -Continue metoprolol  -Lidocaine patch  -Hospice consulted           There is no height or weight on file to calculate BMI. Code Status: DNR     Surrogate Decision Maker:     DVT Prophylaxis: Comfort measures   GI Prophylaxis: not indicated       Subjective:     Chief Complaint / Reason for Physician Visit  No acute issues. Discussed with RN events overnight. Review of Systems:  No fevers, chills, appetite change, cough, sputum production, shortness of breath, dyspnea on exertion, nausea, vomitting, diarrhea, constipation, chest pain, leg edema, abdominal pain, joint pain, rash, itching. Objective:     VITALS:   Last 24hrs VS reviewed since prior progress note. Most recent are:  Patient Vitals for the past 24 hrs:   Temp Pulse Resp BP SpO2   22 0804 98.3 °F (36.8 °C) 75 18 (!) 122/54 97 %   22 1913 98.3 °F (36.8 °C) 86 18 125/89 97 %     No intake or output data in the 24 hours ending 22 1016     PHYSICAL EXAM:  General: WD, WN. Alert, cooperative, no acute distress    EENT:  EOMI. Anicteric sclerae. MMM  Resp:  CTA bilaterally, no wheezing or rales.   No accessory muscle use  CV:  Regular rhythm,  normal S1/S2, no murmurs rubs gallops, No edema  GI:  Soft, Non distended, Non tender. +Bowel sounds  Neurologic:  Alert and oriented X self  Psych:    Not anxious nor agitated  Skin:  No rashes. No jaundice    Reviewed most current lab test results and cultures  YES  Reviewed most current radiology test results   YES  Review and summation of old records today    NO  Reviewed patient's current orders and MAR    YES  PMH/SH reviewed - no change compared to H&P  ________________________________________________________________________  Care Plan discussed with:    Comments   Patient     Family      RN     Care Manager     Consultant                        Multidiciplinary team rounds were held today with , nursing, pharmacist and clinical coordinator. Patient's plan of care was discussed; medications were reviewed and discharge planning was addressed. ________________________________________________________________________  Total NON critical care TIME: 15   Minutes    Total CRITICAL CARE TIME Spent:   Minutes non procedure based      Comments   >50% of visit spent in counseling and coordination of care     ________________________________________________________________________  Sonia Cruz MD     Procedures: see electronic medical records for all procedures/Xrays and details which were not copied into this note but were reviewed prior to creation of Plan. LABS:  I reviewed today's most current labs and imaging studies. Pertinent labs include:  No results for input(s): WBC, HGB, HCT, PLT, HGBEXT, HCTEXT, PLTEXT, HGBEXT, HCTEXT, PLTEXT in the last 72 hours. No results for input(s): NA, K, CL, CO2, GLU, BUN, CREA, CA, MG, PHOS, ALB, TBIL, TBILI, ALT, INR, INREXT, INREXT in the last 72 hours.     No lab exists for component: SGOT    Signed: Sonia Cruz MD

## 2022-05-19 NOTE — PROGRESS NOTES
Handoff report    Report received from Caden Lopez on Mrs. Sun Sow   Report consisted of patients Situation, Background, Assessment and   Recommendations(SBAR), accordion report, MAR, plan of care, results review, orders, review of systems. Patient is in bed. Opportunity for questions and clarification was provided. In to see patient at this time. She is sleeping appearing comfortable on left side. Patient is on CMO and will allow to sleep at this time. Will return to check on patient. 2312-6204: Writer in room to assess patient. Patient in bed initially, bathed and then used total lift to assist patient to chair. Patient unable to get comfortable and uneasy with being unable to move freely as in bed, Used  to confirm this and then used lift to get back to bed per patient request, patient used bedpan and positioned to comfort. Patient drinking water independently. 1340: Rounded on patient and repositioned patient. Pt voided in bedpan and then writer provided aniket-care and positioned to comfort with pillows. 1810: Hospice nurse called with update. She has spoken with family who will be here to see patient Sunday at 1 PM. The Hospice nurse says that she does not meet inpatient hospice criteria and that family does not want to take her home. She suggests re-evaluated status and perhaps rescinding comfort care to get PT/OT involved to see if she can regain any mobility. 1830: Writer in to see patient. Writer assisted patient to use bedpan and she voided 400ml. Patient repositioned. Writer used  to make sure that she is comfortable and to ask her is she is aware of her diagnosis of stroke. Patient voiced concerns that she wants to be able to move and at least get in a chair and that is important to her and to her family.

## 2022-05-19 NOTE — PROGRESS NOTES
Problem: Falls - Risk of  Goal: *Absence of Falls  Description: Document Tushar Gold Fall Risk and appropriate interventions in the flowsheet. Outcome: Progressing Towards Goal  Note: Fall Risk Interventions:  Mobility Interventions: Communicate number of staff needed for ambulation/transfer,Utilize gait belt for transfers/ambulation    Mentation Interventions: Adequate sleep, hydration, pain control    Medication Interventions: Utilize gait belt for transfers/ambulation    Elimination Interventions: Toileting schedule/hourly rounds    History of Falls Interventions: Consult care management for discharge planning         Problem: Patient Education: Go to Patient Education Activity  Goal: Patient/Family Education  Outcome: Progressing Towards Goal     Problem: Pressure Injury - Risk of  Goal: *Prevention of pressure injury  Description: Document Eugene Scale and appropriate interventions in the flowsheet.   Outcome: Progressing Towards Goal  Variance Other (add note)  Note: Pressure Injury Interventions:  Sensory Interventions: Assess need for specialty bed    Moisture Interventions: Absorbent underpads    Activity Interventions: Increase time out of bed    Mobility Interventions: HOB 30 degrees or less    Nutrition Interventions: Document food/fluid/supplement intake    Friction and Shear Interventions: Apply protective barrier, creams and emollients                Problem: Patient Education: Go to Patient Education Activity  Goal: Patient/Family Education  Outcome: Progressing Towards Goal

## 2022-05-20 PROCEDURE — 97530 THERAPEUTIC ACTIVITIES: CPT

## 2022-05-20 PROCEDURE — 97163 PT EVAL HIGH COMPLEX 45 MIN: CPT

## 2022-05-20 PROCEDURE — 74011000250 HC RX REV CODE- 250: Performed by: STUDENT IN AN ORGANIZED HEALTH CARE EDUCATION/TRAINING PROGRAM

## 2022-05-20 PROCEDURE — 74011250637 HC RX REV CODE- 250/637: Performed by: STUDENT IN AN ORGANIZED HEALTH CARE EDUCATION/TRAINING PROGRAM

## 2022-05-20 PROCEDURE — 65270000032 HC RM SEMIPRIVATE

## 2022-05-20 RX ORDER — MORPHINE SULFATE 15 MG/1
15 TABLET ORAL
Status: DISCONTINUED | OUTPATIENT
Start: 2022-05-20 | End: 2022-05-26

## 2022-05-20 RX ORDER — IBUPROFEN 200 MG
400 TABLET ORAL
Status: DISCONTINUED | OUTPATIENT
Start: 2022-05-20 | End: 2022-05-26

## 2022-05-20 RX ADMIN — METOPROLOL SUCCINATE 25 MG: 25 TABLET, EXTENDED RELEASE ORAL at 10:00

## 2022-05-20 RX ADMIN — ONDANSETRON 4 MG: 4 TABLET, ORALLY DISINTEGRATING ORAL at 12:26

## 2022-05-20 RX ADMIN — ACETAMINOPHEN 650 MG: 325 TABLET ORAL at 10:00

## 2022-05-20 NOTE — PROGRESS NOTES
0730) Bedside shift change report given to Emory Merlin, RN (oncoming nurse) by Nola Gomez RN (offgoing nurse). Report included the following information SBAR, Kardex and MAR. Pt understands sign language. Hearing assist device on bedside table. 0930)  [id number] for assessment. 7220) pt request bedpan and tylenol for back  1045) IDR with Dr. Lio ADAMS), Dr. Diana Del Angel (pharmacist), Laurie Leblanc (), and Emory Merlin (RN) to discuss plan of care including physical therapy, discuss application to Medicaid, waiting placement, comfort measures, able to discontinue SCD's, ibuprofen for moderate pain, tablets for severe pain, hearing device at bedside  1920) Bedside shift change report given to Shai Rice RN (oncoming nurse) by Emory Merlin, RN (offgoing nurse). Report included the following information SBAR, Kardex and MAR.

## 2022-05-20 NOTE — PROGRESS NOTES
Hospitalist Progress Note    NAME: Nicole Godoy   :  1955   MRN:  [id number]   Room Number:  610/48  @ [facility]       Interim Hospital Summary: 78 y.o. 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  Intraparenchymal hemorrhage with edema and mass-effect POA  Mass-effect with 4 mm shift to admit for midline POA  Hypertension  Comfort measures POA  -Assessed by neurosurgery at outside hospital, no intervention recommended  -Assessed by neurology at outside hospital  -Assessed by palliative care medicine and patient made comfortable per family request in accordance patient wishes           -Haldol as needed for agitation  -Comfort measures only  -Morphine as needed for pain  -Continue metoprolol  -Lidocaine patch  -Hospice consulted           There is no height or weight on file to calculate BMI. Code Status: DNR     Surrogate Decision Maker:     DVT Prophylaxis: Comfort measures   GI Prophylaxis: not indicated       Subjective:     Chief Complaint / Reason for Physician Visit  No acute issues. Discussed with RN events overnight. Review of Systems:  No fevers, chills, appetite change, cough, sputum production, shortness of breath, dyspnea on exertion, nausea, vomitting, diarrhea, constipation, chest pain, leg edema, abdominal pain, joint pain, rash, itching. Objective:     VITALS:   Last 24hrs VS reviewed since prior progress note.  Most recent are:  Patient Vitals for the past 24 hrs:   Temp Pulse Resp BP SpO2   22 0934 (P) 98.1 °F (36.7 °C) (P) 77 (P) 16 (!) (P) 148/70 (P) 96 %   22 0410 98 °F (36.7 °C) 80 18 114/66 97 %   220 99 °F (37.2 °C) 74 18 (!) 116/44 96 %   22 -- -- -- -- 99 %       Intake/Output Summary (Last 24 hours) at 2022 1047  Last data filed at 2022 1830  Gross per 24 hour   Intake 480 ml   Output 800 ml   Net -320 ml PHYSICAL EXAM:  General: WD, WN. Alert, cooperative, no acute distress    EENT:  EOMI. Anicteric sclerae. MMM  Resp:  CTA bilaterally, no wheezing or rales. No accessory muscle use  CV:  Regular  rhythm,  normal S1/S2, no murmurs rubs gallops, No edema  GI:  Soft, Non distended, Non tender. +Bowel sounds  Neurologic:  Alert and oriented X self  Psych:    Not anxious nor agitated  Skin:  No rashes. No jaundice    Reviewed most current lab test results and cultures  YES  Reviewed most current radiology test results   YES  Review and summation of old records today    NO  Reviewed patient's current orders and MAR    YES  PMH/SH reviewed - no change compared to H&P  ________________________________________________________________________  Care Plan discussed with:    Comments   Patient     Family      RN     Care Manager     Consultant                        Multidiciplinary team rounds were held today with , nursing, pharmacist and clinical coordinator. Patient's plan of care was discussed; medications were reviewed and discharge planning was addressed. ________________________________________________________________________  Total NON critical care TIME: 15   Minutes    Total CRITICAL CARE TIME Spent:   Minutes non procedure based      Comments   >50% of visit spent in counseling and coordination of care     ________________________________________________________________________  Tg Cee MD     Procedures: see electronic medical records for all procedures/Xrays and details which were not copied into this note but were reviewed prior to creation of Plan. LABS:  I reviewed today's most current labs and imaging studies. Pertinent labs include:  No results for input(s): WBC, HGB, HCT, PLT, HGBEXT, HCTEXT, PLTEXT, HGBEXT, HCTEXT, PLTEXT in the last 72 hours.   No results for input(s): NA, K, CL, CO2, GLU, BUN, CREA, CA, MG, PHOS, ALB, TBIL, TBILI, ALT, INR, INREXT, INREXT in the last 72 hours.     No lab exists for component: SGOT    Signed: Zelda Wade MD

## 2022-05-20 NOTE — PROGRESS NOTES
Problem: Mobility Impaired (Adult and Pediatric)  Goal: *Acute Goals and Plan of Care (Insert Text)  Description:   FUNCTIONAL STATUS PRIOR TO ADMISSION: Patient was independent and active without use of DME.    HOME SUPPORT PRIOR TO ADMISSION: The patient lived with family but did not require assist.    Physical Therapy Goals  Initiated 5/20/2022  1. Patient will move from supine to sit and sit to supine  in bed with minimal assistance/contact guard assist within 7 day(s). 2.  Patient will transfer from bed to chair and chair to bed with moderate assistance  using the least restrictive device within 7 day(s). 3.  Patient will perform sit to stand with minimal assistance/contact guard assist within 7 day(s). 4.  Patient will ambulate with moderate assistance  for 20 feet with the least restrictive device within 7 day(s). 6.  Patient will improve Peralta Balance score by 7 points within 7 days. Outcome: Progressing Towards Goal     PHYSICAL THERAPY EVALUATION- NEURO POPULATION  Patient: Catalina Torres (89 y.o. female)  Date: 5/20/2022  Primary Diagnosis: Left-sided weakness [R53.1]        Precautions:   Fall      ASSESSMENT  Based on the objective data described below, the patient presents with L hemiparesis, impaired balance and coordination, decreased insight, and impulsiveness s/p acute right hemorrhage with left side shift. She is overall mod-max A but able to follow commands (language tablet utilized), tolerate sitting up to EOB for a few minutes and make needs known. Though she is comfort care feel she could benefit from skilled PT to improve mobility and decrease burden on family if she returns home. Current Level of Function Impacting Discharge (mobility/balance): mod-max A    Functional Outcome Measure: The patient scored Total: 0/56 on the Peralta Balance Assessment which is indicative of high fall risk.     Other factors to consider for discharge: Salvadorean speaking, no insurance     Patient will benefit from skilled therapy intervention to address the above noted impairments. PLAN :  Recommendations and Planned Interventions: bed mobility training, transfer training, gait training, therapeutic exercises, neuromuscular re-education, patient and family training/education, and therapeutic activities      Frequency/Duration: Patient will be followed by physical therapy:  5 times a week to address goals. Recommendation for discharge: (in order for the patient to meet his/her long term goals)  Therapy 3 hours per day 5-7 days per week    This discharge recommendation:  Has been made in collaboration with the attending provider and/or case management    IF patient discharges home will need the following DME: wheelchair         SUBJECTIVE:   Patient stated I feel like I'm going to throw up (in [address]     OBJECTIVE DATA SUMMARY:   HISTORY:    Past Medical History:   Diagnosis Date    Psychotic disorder     concern for schizoaffective disorder     Past Surgical History:   Procedure Laterality Date    HX TUBAL LIGATION         Personal factors and/or comorbidities impacting plan of care:     Home Situation  Home Environment: Other (comment) (DAVID)  One/Two Story Residence: Other (Comment)  Living Alone: No (david)  Support Systems: Other (Comment) (david)  Patient Expects to be Discharged to[de-identified] Home with hospice  Current DME Used/Available at Home: Other (comment) Katerine Rinaldi)    EXAMINATION/PRESENTATION/DECISION MAKING:   Critical Behavior:  Neurologic State: Sleeping  Orientation Level: Oriented to person,Oriented to place  Cognition: Follows commands     Hearing:   Auditory  Auditory Impairment: Hard of hearing, bilateral (amplifier with patient )  Skin:  NT  Edema: NR  Range Of Motion:  AROM:  (LUE/LE)           PROM: Generally decreased, functional (tightness in L shoulder with elevation)           Strength:    Strength: Grossly decreased, non-functional (L UE/LE)                    Tone & Sensation:   Tone: Abnormal              Sensation:  (unable to test accurately d/t language)               Coordination:  Coordination: Grossly decreased, non-functional  Vision:      Functional Mobility:  Bed Mobility:  Rolling: Minimum assistance (to the right)  Supine to Sit: Moderate assistance; Additional time  Sit to Supine: Maximum assistance  Scooting: Moderate assistance  Transfers:  Sit to Stand: Moderate assistance;Assist x2  Stand to Sit: Moderate assistance                  Patient able to sit EOB for 5 min with constant support due to poor sitting balance. Ataxic trunk movement noted, patient reporting nausea and dizziness.  RN in to give zofran and assist.       Balance:   Sitting: Impaired  Sitting - Static: Poor (constant support)  Sitting - Dynamic: Poor (constant support)  Standing: Impaired  Standing - Static: Poor;Constant support  Standing - Dynamic : Poor  Ambulation/Gait Training:  Distance (ft):  (2 side steps to the right)  Assistive Device:  (R HHA)  Ambulation - Level of Assistance: Maximum assistance;Assist x2         Functional Measure  Peralta Balance Test:    Sitting to Standin  Standing Unsupported: 0  Sitting with Back Unsupported: 0  Standing to Sittin  Transfers: 0  Standing Unsupported with Eyes Closed: 0  Standing Unsupported with Feet Together: 0  Reach Forward with Outstretched Arm: 0   Object: 0  Turn to Look Over Shoulders: 0  Turn 360 Degrees: 0  Alternate Foot on Step/Stool: 0  Standing Unsupported One Foot in Front: 0  Stand on One Le  Total: 0/56         56=Maximum possible score;   0-20=High fall risk  21-40=Moderate fall risk   41-56=Low fall risk        Physical Therapy Evaluation Charge Determination   History Examination Presentation Decision-Making   HIGH Complexity :3+ comorbidities / personal factors will impact the outcome/ POC  HIGH Complexity : 4+ Standardized tests and measures addressing body structure, function, activity limitation and / or participation in recreation  HIGH Complexity : Unstable and unpredictable characteristics  HIGH Complexity : FOTO score of 1- 25       Based on the above components, the patient evaluation is determined to be of the following complexity level: HIGH     Pain Rating:  No pain reported    Activity Tolerance:   Fair      After treatment patient left in no apparent distress:   Supine in bed and Call bell within reach    COMMUNICATION/EDUCATION:   The patients plan of care was discussed with: Registered nurse, Physician, and Case management. Patient was educated regarding her deficit(s) of L sided weakness as this relates to her diagnosis of R CVA. She demonstrated Fair understanding as evidenced by head nod. Patient and/or family was verbally educated on the BE FAST acronym for signs/symptoms of CVA and TIA. BE FAST was written on patient's communication board  for visual education and reinforcement. All questions answered with patient indicating fair understanding. Fall prevention education was provided and the patient/caregiver indicated understanding., Patient/family have participated as able in goal setting and plan of care. , and Patient/family agree to work toward stated goals and plan of care.     Thank you for this referral.  Souleymane Sweeney, PT   Time Calculation: 30 mins

## 2022-05-20 NOTE — PROGRESS NOTES
Care plan reviewed, patient progressing towards goals. Problem: Falls - Risk of  Goal: *Absence of Falls  Description: Document Myranda Barrera Fall Risk and appropriate interventions in the flowsheet. Outcome: Progressing Towards Goal  Note: Fall Risk Interventions:  Mobility Interventions: Communicate number of staff needed for ambulation/transfer    Mentation Interventions: Adequate sleep, hydration, pain control    Medication Interventions: Patient to call before getting OOB,Teach patient to arise slowly    Elimination Interventions: Toileting schedule/hourly rounds    History of Falls Interventions: Door open when patient unattended         Problem: Patient Education: Go to Patient Education Activity  Goal: Patient/Family Education  Outcome: Progressing Towards Goal     Problem: Pressure Injury - Risk of  Goal: *Prevention of pressure injury  Description: Document Eugene Scale and appropriate interventions in the flowsheet. Outcome: Progressing Towards Goal  Note: Pressure Injury Interventions:  Sensory Interventions: Assess need for specialty bed,Float heels,Minimize linen layers    Moisture Interventions: Absorbent underpads,Check for incontinence Q2 hours and as needed,Offer toileting Q_hr    Activity Interventions: Increase time out of bed    Mobility Interventions: Turn and reposition approx.  every two hours(pillow and wedges)    Nutrition Interventions: Document food/fluid/supplement intake,Offer support with meals,snacks and hydration    Friction and Shear Interventions: Apply protective barrier, creams and emollients,HOB 30 degrees or less                Problem: Patient Education: Go to Patient Education Activity  Goal: Patient/Family Education  Outcome: Progressing Towards Goal

## 2022-05-20 NOTE — HOSPICE
[facility]  [phone]     Patient Name: Eric Randall  YOB: 1955  Age: 78 y.o. [facility] RN Note:  Chart reviewed. Patient stable, no indication for GIP hospice admission. Was able to work w/ PT today and would benefit from ongoing PT to improve functioning which may allow family to take patient home w/ hospice assistance. Would be appropriate for ROUTINE level of hospice care at home if family amenable. Approved for FAP at 100% to cover cost of home hospice services. Will continue to follow while  at [facility]    Thank you for the opportunity to be of service to this patient.

## 2022-05-21 PROCEDURE — 74011250637 HC RX REV CODE- 250/637: Performed by: STUDENT IN AN ORGANIZED HEALTH CARE EDUCATION/TRAINING PROGRAM

## 2022-05-21 PROCEDURE — 74011000250 HC RX REV CODE- 250: Performed by: STUDENT IN AN ORGANIZED HEALTH CARE EDUCATION/TRAINING PROGRAM

## 2022-05-21 PROCEDURE — 65270000032 HC RM SEMIPRIVATE

## 2022-05-21 RX ADMIN — METOPROLOL SUCCINATE 25 MG: 25 TABLET, EXTENDED RELEASE ORAL at 09:31

## 2022-05-21 RX ADMIN — ONDANSETRON 4 MG: 4 TABLET, ORALLY DISINTEGRATING ORAL at 13:27

## 2022-05-21 NOTE — PROGRESS NOTES
5915) Bedside shift change report given to Clarence Woods RN (oncoming nurse) by Tom Brennan RN (offgoing nurse). Report included the following information SBAR, Kardex, Intake/Output, MAR and Recent Results. 0845) Pt assessed and asleep in bed at this time. Will attempt assessment when pt more awake    0925) Pt assessed and vital signs obtained.  used. Pt had no c/o pain at this time. Pt repositioned in bed and stated no incontinence care at this time. Left sided weakness noted in upper and lower extremities. 21 ) Interdisciplinary team rounds were held 5/21/2022 with the following team members:Nursing and Physician. Plan of care discussed. See clinical pathway and/or care plan for interventions and desired outcomes. 1140) Pt placed on bed pan. Incontinence care provided along with CHG bath. Pt left sitting up in bed eating lunch at this time. 1235) Pt resting in bed playing on her phone at this time. 1415) Pt tearful in bed at this time. When attempting to assess and use  pt stated \"No\". Pt grabbing at left arm and leg and appears tearful over physical changed. Pt was comforted and left resting in bed at this time. 1540) Pt reassessed and no changes to note. Vital signs not obtained r/t pt's extended stay status. Pt repositioned in bed and stated no additional needs at this time. 1645) Pt resting in bed and repositioned    1815) Pt resting in bed at this time and stated no needs. 1910) Bedside shift change report given to Tom Brennan RN (oncoming nurse) by Clarence Woods RN (offgoing nurse). Report included the following information SBAR, Kardex, Intake/Output and MAR.

## 2022-05-21 NOTE — HOSPICE
Will need new hospice order placed, current order expires today 5/21/22. Chart reviewed. Patient stable, no indication for inpatient hospice admission. Pt worked w/ PT yesterday and would benefit from ongoing PT to improve functioning which may allow family to take patient home w/ hospice assistance.     Pt is appropriate for ROUTINE level of hospice care at home if family amenable. Approved for FAP at 100% to cover cost of home hospice services.

## 2022-05-21 NOTE — PROGRESS NOTES
Hospitalist Progress Note    NAME: Willow Latif   :  1955   MRN:  [id number]   Room Number:  258/65  @ [address] [facility]       Interim Hospital Summary: 78 y.o. 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  Intraparenchymal hemorrhage with edema and mass-effect POA  Mass-effect with 4 mm shift to admit for midline POA  Hypertension  Comfort measures POA  -Assessed by neurosurgery at outside hospital, no intervention recommended  -Assessed by neurology at outside hospital  -Assessed by palliative care medicine and patient made comfortable per family request in accordance patient wishes           -Haldol as needed for agitation  -Comfort measures only  -Morphine as needed for pain  -Continue metoprolol  -Lidocaine patch  -Hospice consulted           There is no height or weight on file to calculate BMI. Code Status: DNR     Surrogate Decision Maker:     DVT Prophylaxis: Comfort measures   GI Prophylaxis: not indicated       Subjective:     Chief Complaint / Reason for Physician Visit  No acute issues. Discussed with RN events overnight. Review of Systems:  No fevers, chills, appetite change, cough, sputum production, shortness of breath, dyspnea on exertion, nausea, vomitting, diarrhea, constipation, chest pain, leg edema, abdominal pain, joint pain, rash, itching. Objective:     VITALS:   Last 24hrs VS reviewed since prior progress note. Most recent are:  Patient Vitals for the past 24 hrs:   Temp Pulse Resp BP SpO2   22 0925 97.1 °F (36.2 °C) 71 16 135/60 100 %   22 98.7 °F (37.1 °C) 75 18 (!) 126/56 96 %     No intake or output data in the 24 hours ending 22 1023     PHYSICAL EXAM:  General: WD, WN. Alert, cooperative, no acute distress    EENT:  EOMI. Anicteric sclerae. MMM  Resp:  CTA bilaterally, no wheezing or rales.   No accessory muscle use  CV:  Regular rhythm,  normal S1/S2, no murmurs rubs gallops, No edema  GI:  Soft, Non distended, Non tender. +Bowel sounds  Neurologic:  Alert and oriented X self  Psych:    Not anxious nor agitated  Skin:  No rashes. No jaundice    Reviewed most current lab test results and cultures  YES  Reviewed most current radiology test results   YES  Review and summation of old records today    NO  Reviewed patient's current orders and MAR    YES  PMH/SH reviewed - no change compared to H&P  ________________________________________________________________________  Care Plan discussed with:    Comments   Patient     Family      RN     Care Manager     Consultant                        Multidiciplinary team rounds were held today with , nursing, pharmacist and clinical coordinator. Patient's plan of care was discussed; medications were reviewed and discharge planning was addressed. ________________________________________________________________________  Total NON critical care TIME: 15   Minutes    Total CRITICAL CARE TIME Spent:   Minutes non procedure based      Comments   >50% of visit spent in counseling and coordination of care     ________________________________________________________________________  Zelda Wade MD     Procedures: see electronic medical records for all procedures/Xrays and details which were not copied into this note but were reviewed prior to creation of Plan. LABS:  I reviewed today's most current labs and imaging studies. Pertinent labs include:  No results for input(s): WBC, HGB, HCT, PLT, HGBEXT, HCTEXT, PLTEXT, HGBEXT, HCTEXT, PLTEXT in the last 72 hours. No results for input(s): NA, K, CL, CO2, GLU, BUN, CREA, CA, MG, PHOS, ALB, TBIL, TBILI, ALT, INR, INREXT, INREXT in the last 72 hours.     No lab exists for component: SGOT    Signed: Zelda Wade MD

## 2022-05-21 NOTE — PROGRESS NOTES
Bedside shift change report given to Rosa Seth (oncoming nurse) by Ligia Faulkner (offgoing nurse). Report included the following information SBAR, Kardex, Intake/Output, MAR and Recent Results.

## 2022-05-21 NOTE — PROGRESS NOTES
Problem: Falls - Risk of  Goal: *Absence of Falls  Description: Document Tulio Linder Fall Risk and appropriate interventions in the flowsheet. Outcome: Progressing Towards Goal  Note: Fall Risk Interventions:  Mobility Interventions: Bed/chair exit alarm,Patient to call before getting OOB    Mentation Interventions: Door open when patient unattended,Increase mobility,Update white board    Medication Interventions: Teach patient to arise slowly    Elimination Interventions: Call light in reach,Toileting schedule/hourly rounds    History of Falls Interventions: Bed/chair exit alarm,Door open when patient unattended         Problem: Pressure Injury - Risk of  Goal: *Prevention of pressure injury  Description: Document Eugene Scale and appropriate interventions in the flowsheet.   Outcome: Progressing Towards Goal  Note: Pressure Injury Interventions:  Sensory Interventions: Assess need for specialty bed,Keep linens dry and wrinkle-free,Maintain/enhance activity level    Moisture Interventions: Check for incontinence Q2 hours and as needed,Apply protective barrier, creams and emollients,Internal/External urinary devices    Activity Interventions: Increase time out of bed,PT/OT evaluation    Mobility Interventions: PT/OT evaluation,HOB 30 degrees or less    Nutrition Interventions: Offer support with meals,snacks and hydration    Friction and Shear Interventions: Apply protective barrier, creams and emollients,Feet elevated on foot rest,Foam dressings/transparent film/skin sealants,Minimize layers

## 2022-05-22 PROCEDURE — 74011250636 HC RX REV CODE- 250/636: Performed by: STUDENT IN AN ORGANIZED HEALTH CARE EDUCATION/TRAINING PROGRAM

## 2022-05-22 PROCEDURE — 74011250637 HC RX REV CODE- 250/637: Performed by: STUDENT IN AN ORGANIZED HEALTH CARE EDUCATION/TRAINING PROGRAM

## 2022-05-22 PROCEDURE — 65270000032 HC RM SEMIPRIVATE

## 2022-05-22 PROCEDURE — 74011000250 HC RX REV CODE- 250: Performed by: STUDENT IN AN ORGANIZED HEALTH CARE EDUCATION/TRAINING PROGRAM

## 2022-05-22 RX ORDER — MECLIZINE HCL 12.5 MG 12.5 MG/1
25 TABLET ORAL
Status: DISCONTINUED | OUTPATIENT
Start: 2022-05-22 | End: 2022-06-22 | Stop reason: HOSPADM

## 2022-05-22 RX ADMIN — ACETAMINOPHEN 650 MG: 325 TABLET ORAL at 12:29

## 2022-05-22 RX ADMIN — METOPROLOL SUCCINATE 25 MG: 25 TABLET, EXTENDED RELEASE ORAL at 09:17

## 2022-05-22 RX ADMIN — MECLIZINE 25 MG: 12.5 TABLET ORAL at 12:28

## 2022-05-22 NOTE — PROGRESS NOTES
Hospitalist Progress Note    NAME: Beth Cruz   :  1955   MRN:  [id number]   Room Number:  347/53  @ [facility]       Interim Hospital Summary: 78 y.o. 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  Intraparenchymal hemorrhage with edema and mass-effect POA  Mass-effect with 4 mm shift to admit for midline POA  Hypertension  Comfort measures POA  -Assessed by neurosurgery at outside hospital, no intervention recommended  -Assessed by neurology at outside hospital  -Assessed by palliative care medicine and patient made comfortable per family request in accordance patient wishes           -Haldol as needed for agitation  -Comfort measures only  -Morphine as needed for pain  -Continue metoprolol  -Lidocaine patch  -Hospice consulted  - Meclizine PRN for dizziness           There is no height or weight on file to calculate BMI. Code Status: DNR     Surrogate Decision Maker:     DVT Prophylaxis: Comfort measures   GI Prophylaxis: not indicated       Subjective:     Chief Complaint / Reason for Physician Visit  No acute issues. Discussed with RN events overnight. Review of Systems:  No fevers, chills, appetite change, cough, sputum production, shortness of breath, dyspnea on exertion, nausea, vomitting, diarrhea, constipation, chest pain, leg edema, abdominal pain, joint pain, rash, itching. Objective:     VITALS:   Last 24hrs VS reviewed since prior progress note. Most recent are:  Patient Vitals for the past 24 hrs:   Temp Pulse Resp BP SpO2   22 0821 97.7 °F (36.5 °C) 73 16 (!) 139/58 99 %   22 98.8 °F (37.1 °C) 71 16 121/73 99 %     No intake or output data in the 24 hours ending 22 1023     PHYSICAL EXAM:  General: WD, WN. Alert, cooperative, no acute distress    EENT:  EOMI. Anicteric sclerae. MMM  Resp:  CTA bilaterally, no wheezing or rales.   No accessory muscle use  CV:  Regular  rhythm,  normal S1/S2, no murmurs rubs gallops, No edema  GI:  Soft, Non distended, Non tender. +Bowel sounds  Neurologic:  Alert and oriented X self  Psych:    Not anxious nor agitated  Skin:  No rashes. No jaundice    Reviewed most current lab test results and cultures  YES  Reviewed most current radiology test results   YES  Review and summation of old records today    NO  Reviewed patient's current orders and MAR    YES  PMH/SH reviewed - no change compared to H&P  ________________________________________________________________________  Care Plan discussed with:    Comments   Patient     Family      RN     Care Manager     Consultant                        Multidiciplinary team rounds were held today with , nursing, pharmacist and clinical coordinator. Patient's plan of care was discussed; medications were reviewed and discharge planning was addressed. ________________________________________________________________________  Total NON critical care TIME: 25   Minutes    Total CRITICAL CARE TIME Spent:   Minutes non procedure based      Comments   >50% of visit spent in counseling and coordination of care     ________________________________________________________________________  Ayanna Helton MD     Procedures: see electronic medical records for all procedures/Xrays and details which were not copied into this note but were reviewed prior to creation of Plan. LABS:  I reviewed today's most current labs and imaging studies. Pertinent labs include:  No results for input(s): WBC, HGB, HCT, PLT, HGBEXT, HCTEXT, PLTEXT, HGBEXT, HCTEXT, PLTEXT in the last 72 hours. No results for input(s): NA, K, CL, CO2, GLU, BUN, CREA, CA, MG, PHOS, ALB, TBIL, TBILI, ALT, INR, INREXT, INREXT in the last 72 hours.     No lab exists for component: SGOT    Signed: Ayanna Helton MD

## 2022-05-22 NOTE — PROGRESS NOTES
1185) Bedside shift change report given to Juan Oleary (oncoming nurse) by Neto Mcpherson, BOB (offgoing nurse). Report included the following information SBAR, Kardex and MAR.   Lisandro Ledesma) pt sleeping  0845)  [id number]. Pt reports no pain, but numbness L lower leg. Pt report dizziness when siting in bed for CHG bath. 12) [employer] Dr. Autumn Sue there anything we could give for dizziness--maybe meclizine?  (worse when sitting)  [phone])  [id number]--DZ discuss meclizine for dizziness. Pt tearful in room. [phone] with pt permission to daughter Concha Turpin; pt physical therapy worked with her Friday. Plan to give Zofran and meclizine. 1340) [facility] Pt daughter is at bedside with  machine--do you have time to see her?  [phone] to call daughter anytime tomorrow. 1350) Jorge Luisve Dr. Xenia Calderon you use the green  screen, they can connect to a phone. Daughter is Jose Angel Espinoza [phone]. She said anytime tomorrow is fine. Questions about how her health is progressing. They still are saying that no one can be at home to take care of her right now. 1920) Bedside shift change report given to Neto Mcpherson RN (oncoming nurse) by Juan Oleary RN (offgoing nurse). Report included the following information SBAR, Kardex and MAR.

## 2022-05-22 NOTE — PROGRESS NOTES
Bedside shift change report given to [facility] DAVID (oncoming nurse) by Srinivasa Cooper (offgoing nurse). Report included the following information SBAR, Kardex, Intake/Output and Recent Results.

## 2022-05-23 PROCEDURE — 74011250637 HC RX REV CODE- 250/637: Performed by: STUDENT IN AN ORGANIZED HEALTH CARE EDUCATION/TRAINING PROGRAM

## 2022-05-23 PROCEDURE — 97112 NEUROMUSCULAR REEDUCATION: CPT

## 2022-05-23 PROCEDURE — 97116 GAIT TRAINING THERAPY: CPT

## 2022-05-23 PROCEDURE — 97535 SELF CARE MNGMENT TRAINING: CPT

## 2022-05-23 PROCEDURE — 97530 THERAPEUTIC ACTIVITIES: CPT

## 2022-05-23 PROCEDURE — 65270000032 HC RM SEMIPRIVATE

## 2022-05-23 PROCEDURE — 97165 OT EVAL LOW COMPLEX 30 MIN: CPT

## 2022-05-23 RX ADMIN — MORPHINE SULFATE 15 MG: 15 TABLET ORAL at 13:35

## 2022-05-23 NOTE — PROGRESS NOTES
Physical Therapy Note    PT orders received and acknowledged. Chart reveiwed and patient is evaluated. Recommend x2 assist for all mobility due to L hemiplegia and IPR at D/C.

## 2022-05-23 NOTE — PROGRESS NOTES
Problem: Falls - Risk of  Goal: *Absence of Falls  Description: Document Tushar Gold Fall Risk and appropriate interventions in the flowsheet. Outcome: Progressing Towards Goal  Note: Fall Risk Interventions:  Mobility Interventions: Bed/chair exit alarm,Communicate number of staff needed for ambulation/transfer,Mechanical lift,PT Consult for mobility concerns    Mentation Interventions: Adequate sleep, hydration, pain control,Bed/chair exit alarm,Door open when patient unattended    Medication Interventions: Bed/chair exit alarm    Elimination Interventions: Bed/chair exit alarm,Call light in reach    History of Falls Interventions: Bed/chair exit alarm,Door open when patient unattended         Problem: Pressure Injury - Risk of  Goal: *Prevention of pressure injury  Description: Document Eugene Scale and appropriate interventions in the flowsheet.   Outcome: Progressing Towards Goal  Note: Pressure Injury Interventions:  Sensory Interventions: Check visual cues for pain,Keep linens dry and wrinkle-free,Minimize linen layers    Moisture Interventions: Absorbent underpads,Apply protective barrier, creams and emollients,Minimize layers    Activity Interventions: PT/OT evaluation    Mobility Interventions: PT/OT evaluation    Nutrition Interventions: Document food/fluid/supplement intake,Offer support with meals,snacks and hydration    Friction and Shear Interventions: Apply protective barrier, creams and emollients,Minimize layers

## 2022-05-23 NOTE — PROGRESS NOTES
Problem: Self Care Deficits Care Plan (Adult)  Goal: *Acute Goals and Plan of Care (Insert Text)  Description: FUNCTIONAL STATUS PRIOR TO ADMISSION: Patient was independent and active without use of DME.    HOME SUPPORT: The patient lived with family but did not require assist.    Occupational Therapy Goals  Initiated 5/23/2022   1. Patient will perform seated grooming with minimal assistance  within 7 day(s). 2.  Patient will perform upper body dressing with moderate assistance  within 7 day(s). 3.  Patient will perform lower body dressing with moderate assistance within 7 day(s). 4.  Patient will perform toilet transfers to [facility] with moderate assistance within 7 day(s). 5.  Patient will perform all aspects of toileting with moderate assistance  within 7 day(s). 6.  Patient will participate in upper extremity therapeutic exercise/activities with maximal assistance within 7 day(s). 7.  Patient will utilize energy conservation and fall prevention techniques during functional activities with verbal cues within 7 day(s). 8.  Patient will improve their Fugl Harris score by 5 points in prep for ADLs within 7 days. Outcome: Progressing Towards Goal   OCCUPATIONAL THERAPY EVALUATION  Patient: Jorge L Bird (65 y.o. female)  Date: 5/23/2022  Primary Diagnosis: Left-sided weakness [R53.1]        Precautions: Fall    ASSESSMENT  Based on the objective data described below, the patient presents with impaired functional mobility and balance, altered LUE/LLE sensation, L-side weakness, suspected processing deficits, impaired motor planning, and overall decline in functional independence s/p admission for acute subacute hemorrhagic stroke with L-side weakness. Patient is received resting in bed, agreeable to session with [employer]  utilized for communication. Patient with strong R lean seated EOB, demonstrating heavy reliance on RUE for sitting balance.  Patient participates in short distance ambulation in prep for toileting at [facility], requiring mod assist for sit<>stand with max assist x2 needed for ambulation. With testing, patient demonstrates active L hand grasp and release and partial wrist range with increased weakness proximally. Upon return to bed, patient dons mesh underwear in supine, utilizing bridging and RUE with overall max assist needed to complete task. At this time, patient is well below baseline but is motivated to regain independence and puts forth great effort during session. For best outcome, recommend discharge to [facility]. Current Level of Function Impacting Discharge (ADLs/self-care): up to max A x2 for BSC; up to total A for self-care    Functional Outcome Measure: The patient scored Total A-D  Total A-D (Motor Function): 27/66 on the Fugl-Harris Assessment which is indicative of moderate severe impairment in upper extremity functional status. Other factors to consider for discharge: Congolese speaking (use [employer] system)     Patient will benefit from skilled therapy intervention to address the above noted impairments. PLAN :  Recommendations and Planned Interventions: self care training, functional mobility training, therapeutic exercise, balance training, therapeutic activities, endurance activities, neuromuscular re-education, patient education, home safety training, and family training/education    Frequency/Duration: Patient will be followed by occupational therapy 5 times a week to address goals. Recommendation for discharge: (in order for the patient to meet his/her long term goals)  Therapy 3 hours per day 5-7 days per week    This discharge recommendation:  Has been made in collaboration with the attending provider and/or case management    IF patient discharges home will need the following DME: TBD       SUBJECTIVE:   Patient stated Wu.     OBJECTIVE DATA SUMMARY:   HISTORY:   Past Medical History:   Diagnosis Date    Psychotic disorder     concern for schizoaffective disorder     Past Surgical History:   Procedure Laterality Date    HX TUBAL LIGATION       Expanded or extensive additional review of patient history:     Home Situation  Home Environment: Trailer/mobile home  # Steps to Enter:  (?)  One/Two Story Residence: One story  Living Alone: No  Support Systems: Child(justin)  Patient Expects to be Discharged to[de-identified] Home with hospice  Current DME Used/Available at Home: Other (comment) (natalio)    Hand dominance: Right    EXAMINATION OF PERFORMANCE DEFICITS:  Cognitive/Behavioral Status:  Neurologic State: Alert  Orientation Level: Oriented to place;Oriented to person  Cognition: Follows commands;Decreased command following;Decreased attention/concentration  Perception: Cues to attend to left side of body  Perseveration: No perseveration noted  Safety/Judgement: Decreased insight into deficits; Fall prevention    Skin:     Edema:     Hearing: Auditory  Auditory Impairment: Hard of hearing, bilateral (amplifier with patient )    Vision/Perceptual:    Acuity: Within Defined Limits;Able to read normal print without difficulty    Corrective Lenses: Reading glasses    Range of Motion:  AROM: Grossly decreased, non-functional (LUE)  PROM: Within functional limits    Strength:  Strength: Grossly decreased, non-functional (LUE)    Coordination:  Coordination: Grossly decreased, non-functional (LUE/LLE)  Fine Motor Skills-Upper: Left Impaired;Right Intact    Gross Motor Skills-Upper: Left Impaired;Right Intact    Tone & Sensation:    Sensation: Impaired (LLE diminished)    Balance:  Sitting: With support  Sitting - Static: Fair (occasional)  Sitting - Dynamic: Poor (constant support)  Standing: Impaired; With support  Standing - Static: Constant support;Poor  Standing - Dynamic : Constant support;Poor    Functional Mobility and Transfers for ADLs:  Bed Mobility:  Rolling: Minimum assistance  Supine to Sit: Moderate assistance  Sit to Supine:  Moderate assistance  Scooting: Moderate assistance    Transfers:  Sit to Stand: Moderate assistance  Stand to Sit: Moderate assistance  Toilet Transfer : Maximum assistance;Assist x2 (infer per observed mobility; to [facility])    ADL Assessment:  Feeding: Moderate assistance    Oral Facial Hygiene/Grooming: Moderate assistance (in supported position)    Bathing: Maximum assistance    Upper Body Dressing: Moderate assistance    Lower Body Dressing: Maximum assistance    Toileting: Maximum assistance    ADL Intervention and task modifications:  Feeding  Feeding Assistance: Set-up  Drink to Mouth: Stand-by assistance    Lower Body Dressing Assistance  Underpants: Maximum assistance; Compensatory technique training (in supine)    Cognitive Retraining  Safety/Judgement: Decreased insight into deficits; Fall prevention    Therapeutic Exercise:  ODALIS RODRÍGUEZ    Functional Measure:  Fugl-Harris Assessment of Motor Recovery after Stroke:   Reflex Activity  Flexors/Biceps/Fingers: Can be elicited  Extensors/Triceps: Can be elicited  Reflex Subtotal: 4    Volitional Movement Within Synergies  Shoulder Retraction: None  Shoulder Elevation: Partial  Shoulder Abduction (90 degrees): None  Shoulder External Rotation: None  Elbow Flexion: None  Forearm Supination: None  Shoulder Adduction/Internal Rotation: None  Elbow Extension: Partial  Forearm Pronation: Partial  Subtotal: 3    Volitional Movement Mixing Synergies  Hand to Lumbar Spine: None  Shoulder Flexion (0-90 degrees): None  Pronation-Supination: None  Subtotal: 0    Volitional Movement With Little or No Synergy  Shoulder Abduction (0-90 degrees): None  Shoulder Flexion ( degrees): None  Pronation/Supination: None  Subtotal : 0    Normal Reflex Activity  Biceps, Triceps, Finger Flexors:  Full  Subtotal : 2    Upper Extremity Total   Upper Extremity Total: 9    Wrist  Stability at 15 Degree Dorsiflexion: Partial  Repeated Dorsiflexion/ Volar Flexion: Partial  Stability at 15 Degree Dorsiflexion: Partial  Repeated Dorsiflexion/ Volar Flexion: Partial  Circumduction: Partial  Wrist Total: 5    Hand  Mass Flexion: Full  Mass Extension: Full  Grasp A: Full  Grasp B: Full  Grasp C: Full  Grasp D: Full  Grasp E: Partial  Hand Total: 13    Coordination/Speed  Tremor: Marked  Dysmetria: Marked  Time: >5s  Coordination/Speed Total : 0    Total A-D  Total A-D (Motor Function): 27/66     This is a reliable/valid measure of arm function after a neurological event. It has established value to characterize functional status and for measuring spontaneous and therapy-induced recovery; tests proximal and distal motor functions. Fugl-Harris Assessment - UE scores recorded between five and 30 days post neurologic event can be used to predict UE recovery at six months post neurologic event. Severe = 0-21 points   Moderately Severe = 22-33 points   Moderate = 34-47 points   Mild = 48-66 points  MARIA T Hewitt, SHAILESH Toribio, & ROCKY Clay (1992). Measurement of motor recovery after stroke: Outcome assessment and sample size requirements.  Stroke, 23, pp. 6150-6132.   ------------------------------------------------------------------------------------------------------------------------------------------------------------------  MCID:  Stroke:   Flora Wallace et al, 2001; n = 171; mean age 79 (5) years; assessed within 16 (12) days of stroke, Acute Stroke)  FMA Motor Scores from Admission to Discharge    10 point increase in FMA Upper Extremity = 1.5 change in discharge FIM    10 point increase in FMA Lower Extremity = 1.9 change in discharge FIM  MDC:   Stroke:   Dwayne Ferguson et al, 2008, n = 14, mean age = 59.9 (14.6) years, assessed on average 14 (6.5) months post stroke, Chronic Stroke)    FMA = 5.2 points for the Upper Extremity portion of the assessment     Occupational Therapy Evaluation Charge Determination   History Examination Decision-Making   LOW Complexity : Brief history review  LOW Complexity : 1-3 performance deficits relating to physical, cognitive , or psychosocial skils that result in activity limitations and / or participation restrictions  LOW Complexity : No comorbidities that affect functional and no verbal or physical assistance needed to complete eval tasks       Based on the above components, the patient evaluation is determined to be of the following complexity level: LOW   Pain Rating:  Declines. Activity Tolerance:   Fair and requires rest breaks    After treatment patient left in no apparent distress:    Supine in bed, Call bell within reach, Bed / chair alarm activated, and Side rails x 3    COMMUNICATION/EDUCATION:   The patients plan of care was discussed with: Physical therapist and Registered nurse. Patient and/or family was verbally educated on the BE FAST acronym for signs/symptoms of CVA and TIA. BE FAST handout in Danish provided and reviewed. All questions answered with patient indicating fair understanding. Home safety education was provided and the patient/caregiver indicated understanding., Patient/family have participated as able in goal setting and plan of care. , and Patient/family agree to work toward stated goals and plan of care.     Thank you for this referral.  Rashad Mcginnis OT  Time Calculation: 37 mins

## 2022-05-23 NOTE — PROGRESS NOTES
Problem: Mobility Impaired (Adult and Pediatric)  Goal: *Acute Goals and Plan of Care (Insert Text)  Description:   FUNCTIONAL STATUS PRIOR TO ADMISSION: Patient was independent and active without use of DME.    HOME SUPPORT PRIOR TO ADMISSION: The patient lived with family but did not require assist.    Physical Therapy Goals  Initiated 5/20/2022  1. Patient will move from supine to sit and sit to supine  in bed with minimal assistance/contact guard assist within 7 day(s). 2.  Patient will transfer from bed to chair and chair to bed with moderate assistance  using the least restrictive device within 7 day(s). 3.  Patient will perform sit to stand with minimal assistance/contact guard assist within 7 day(s). 4.  Patient will ambulate with moderate assistance  for 20 feet with the least restrictive device within 7 day(s). 6.  Patient will improve Peralta Balance score by 7 points within 7 days. Outcome: Progressing Towards Goal   PHYSICAL THERAPY TREATMENT  Patient: Cm Kessler (18 y.o. female)  Date: 5/23/2022  Diagnosis: Left-sided weakness [R53.1] <principal problem not specified>       Precautions: Fall  Chart, physical therapy assessment, plan of care and goals were reviewed. ASSESSMENT  Patient continues with skilled PT services and is progressing towards goals. Patient demonstrates strong R UE and uses that with momentum to to change position (supine<>sit and rolling). She demonstrates inability to maintain sitting balance EOB without the use of R UE. She performs sit<>stand with Mod A especially for initial standing balance due to hemiplegia. Patient is able to ambulate about 15 ft with Max A x2. She demonstrates the need for Mod- Max A weight shifting and progressing the L LE in gait. Patient reports altered L LE sensation and demonstrates decreased step length coordination requiring A for balance and positioning. Current Level of Function Impacting Discharge (mobility/balance):  Max A 1-2     Other factors to consider for discharge: medical stability, inability to ambulate or transfer without Ax2, increased risk for falls, home alone when children are working          PLAN :  Patient continues to benefit from skilled intervention to address the above impairments. Continue treatment per established plan of care. to address goals. Recommendation for discharge: (in order for the patient to meet his/her long term goals)  Therapy 3 hours per day 5-7 days per week    This discharge recommendation:  Has not yet been discussed the attending provider and/or case management    IF patient discharges home will need the following DME: to be determined (TBD)       SUBJECTIVE:   Patient stated I can't sit up.     OBJECTIVE DATA SUMMARY:   Critical Behavior:  Neurologic State: Alert  Orientation Level: Oriented to place,Oriented to person  Cognition: Follows commands,Decreased command following,Decreased attention/concentration  Safety/Judgement: Decreased insight into deficits,Fall prevention  Functional Mobility Training:  Bed Mobility:  Rolling: Minimum assistance  Supine to Sit: Moderate assistance  Sit to Supine: Moderate assistance  Scooting: Moderate assistance        Transfers:  Sit to Stand: Moderate assistance  Stand to Sit: Moderate assistance                             Balance:  Sitting: With support  Sitting - Static: Fair (occasional)  Sitting - Dynamic: Poor (constant support)  Standing: Impaired; With support  Standing - Static: Constant support;Poor  Standing - Dynamic : Constant support;Poor  Ambulation/Gait Training:  Distance (ft): 15 Feet (ft)  Assistive Device: Gait belt (bilateral HHA )  Ambulation - Level of Assistance: Maximum assistance;Assist x2        Gait Abnormalities: Decreased step clearance; Ataxic;Path deviations;Scissoring;Hemiplegic;Trunk sway increased        Base of Support: Center of gravity altered  Stance: Left decreased  Speed/Marcelina: Fluctuations  Step Length: Left shortened  Swing Pattern: Left asymmetrical                 Stairs: Therapeutic Exercises:     Pain Rating:      Activity Tolerance:   Fair    After treatment patient left in no apparent distress:   Supine in bed, Call bell within reach, Bed / chair alarm activated, and Side rails x 3    COMMUNICATION/COLLABORATION:   The patients plan of care was discussed with: Occupational therapist and Registered nurse.      Ara Alcaraz, PT   Time Calculation: 38 mins

## 2022-05-23 NOTE — PROGRESS NOTES
Bedside shift change report given to JESSICA Diamond (oncoming nurse) by Alejandro Hernandez (offgoing nurse). Report included the following information SBAR, Kardex, Intake/Output, MAR and Recent Results.

## 2022-05-23 NOTE — PROGRESS NOTES
Hospitalist Progress Note    NAME: Christo Gunderson   :  1955   MRN:  [id number]   Room Number:  687/57  @ [facility]       Interim Hospital Summary: 78 y.o. 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  Intraparenchymal hemorrhage with edema and mass-effect POA  Mass-effect with 4 mm shift to admit for midline POA  Hypertension  Comfort measures POA  -Assessed by neurosurgery at outside hospital, no intervention recommended  -Assessed by neurology at outside hospital  -Assessed by palliative care medicine and patient made comfortable per family request in accordance patient wishes           -Haldol as needed for agitation  -Comfort measures only  -Morphine as needed for pain  -Continue metoprolol  -Lidocaine patch  -Hospice consulted  - Meclizine PRN for dizziness           There is no height or weight on file to calculate BMI. Code Status: DNR     Surrogate Decision Maker:     DVT Prophylaxis: Comfort measures   GI Prophylaxis: not indicated       Subjective:     Chief Complaint / Reason for Physician Visit  No acute issues. Discussed with RN events overnight. Review of Systems:  No fevers, chills, appetite change, cough, sputum production, shortness of breath, dyspnea on exertion, nausea, vomitting, diarrhea, constipation, chest pain, leg edema, abdominal pain, joint pain, rash, itching. Objective:     VITALS:   Last 24hrs VS reviewed since prior progress note. Most recent are:  Patient Vitals for the past 24 hrs:   Temp Pulse Resp BP SpO2   22 0845 -- 78 -- (!) 121/52 --   22 0840 98.5 °F (36.9 °C) 78 17 (!) 121/52 95 %   22 1959 98.3 °F (36.8 °C) 66 16 (!) 117/56 98 %     No intake or output data in the 24 hours ending 22 1020     PHYSICAL EXAM:  General: WD, WN. Alert, cooperative, no acute distress    EENT:  EOMI. Anicteric sclerae.  MMM  Resp:  CTA bilaterally, no wheezing or rales. No accessory muscle use  CV:  Regular  rhythm,  normal S1/S2, no murmurs rubs gallops, No edema  GI:  Soft, Non distended, Non tender. +Bowel sounds  Neurologic:  Alert and oriented X self  Psych:    Not anxious nor agitated  Skin:  No rashes. No jaundice    Reviewed most current lab test results and cultures  YES  Reviewed most current radiology test results   YES  Review and summation of old records today    NO  Reviewed patient's current orders and MAR    YES  PMH/SH reviewed - no change compared to H&P  ________________________________________________________________________  Care Plan discussed with:    Comments   Patient     Family      RN     Care Manager     Consultant                        Multidiciplinary team rounds were held today with , nursing, pharmacist and clinical coordinator. Patient's plan of care was discussed; medications were reviewed and discharge planning was addressed. ________________________________________________________________________  Total NON critical care TIME: 25   Minutes    Total CRITICAL CARE TIME Spent:   Minutes non procedure based      Comments   >50% of visit spent in counseling and coordination of care     ________________________________________________________________________  Balwinder Galvan MD     Procedures: see electronic medical records for all procedures/Xrays and details which were not copied into this note but were reviewed prior to creation of Plan. LABS:  I reviewed today's most current labs and imaging studies. Pertinent labs include:  No results for input(s): WBC, HGB, HCT, PLT, HGBEXT, HCTEXT, PLTEXT, HGBEXT, HCTEXT, PLTEXT in the last 72 hours. No results for input(s): NA, K, CL, CO2, GLU, BUN, CREA, CA, MG, PHOS, ALB, TBIL, TBILI, ALT, INR, INREXT, INREXT in the last 72 hours.     No lab exists for component: SGOT    Signed: Balwinder Galvan MD

## 2022-05-24 PROCEDURE — 97535 SELF CARE MNGMENT TRAINING: CPT

## 2022-05-24 PROCEDURE — 74011250637 HC RX REV CODE- 250/637: Performed by: STUDENT IN AN ORGANIZED HEALTH CARE EDUCATION/TRAINING PROGRAM

## 2022-05-24 PROCEDURE — 74011000250 HC RX REV CODE- 250: Performed by: STUDENT IN AN ORGANIZED HEALTH CARE EDUCATION/TRAINING PROGRAM

## 2022-05-24 PROCEDURE — 65270000032 HC RM SEMIPRIVATE

## 2022-05-24 PROCEDURE — 97530 THERAPEUTIC ACTIVITIES: CPT

## 2022-05-24 PROCEDURE — 97112 NEUROMUSCULAR REEDUCATION: CPT

## 2022-05-24 RX ADMIN — METOPROLOL SUCCINATE 25 MG: 25 TABLET, EXTENDED RELEASE ORAL at 09:03

## 2022-05-24 RX ADMIN — MORPHINE SULFATE 15 MG: 15 TABLET ORAL at 00:15

## 2022-05-24 NOTE — PROGRESS NOTES
0710) Bedside shift change report given to Bettie Sarmiento RN (oncoming nurse) by Christen Gr RN (offgoing nurse). Report included the following information SBAR, Kardex, Intake/Output, MAR and Recent Results. Pt sitting up in bed resting at this time. Pt repositioned and stated no additional needs. 9523) Pt assessed and vital signs stable. Pt has no c/o pain at this time. Pt expressed concerns of wanting to go home and see her family. Will contact CM to see how to get in touch with family. Scheduled med given. Pt repositioned in bed and left eating at this time. 0950) Pt asleep in bed at this time. 21 ) Interdisciplinary team rounds were held 5/24/2022 with the following team members:Care Management, Nursing, Pharmacy and Physician. Plan of care discussed. See clinical pathway and/or care plan for interventions and desired outcomes. 1035) Pt asleep in bed at this time. 1115) Pt resting in bed and stated no needs at this time    1255) Pt resting in bed at this time. Bed pan provided. Linens changed and new gown provided. Pt bathed with Chg. Pt repositioned in bed and stated no additional needs at this time. Snack provided per request.     5310) Pt asleep in bed at this time. RR 14    1450) Hospice nurse called regarding consult stating they needed another consult. Hospice consult placed 5/17/2022 but they stated they are unable to see the order. Call transferred to velvet with .      1515) Pt asleep in bed at this time. Vital signs not obtained r/t pt's extended stay status. Pt left sleeping in bed at this time. No changes to note. 1655) Pt used bed pan. Pt repositioned in bed. New bed pad and gown provided. Pt sitting up in bed eating dinner at this time. Pt stated no additional needs. 1800) Pt assisted with self care. Basin soap and water provided. Pt performed mouth care. This writer assisted with washing hair. Pt repositioned in bed and stated no additional needs at this time.      1940) Bedside shift change report given to Waldemar Melendez (oncoming nurse) by Neva Parrish RN (offgoing nurse). Report included the following information SBAR, Kardex, Intake/Output, MAR and Recent Results.

## 2022-05-24 NOTE — PROGRESS NOTES
Problem: Self Care Deficits Care Plan (Adult)  Goal: *Acute Goals and Plan of Care (Insert Text)  Description: FUNCTIONAL STATUS PRIOR TO ADMISSION: Patient was independent and active without use of DME.    HOME SUPPORT: The patient lived with family but did not require assist.    Occupational Therapy Goals  Initiated 5/23/2022   1. Patient will perform seated grooming with minimal assistance  within 7 day(s). 2.  Patient will perform upper body dressing with moderate assistance  within 7 day(s). 3.  Patient will perform lower body dressing with moderate assistance within 7 day(s). 4.  Patient will perform toilet transfers to [facility] with moderate assistance within 7 day(s). 5.  Patient will perform all aspects of toileting with moderate assistance  within 7 day(s). 6.  Patient will participate in upper extremity therapeutic exercise/activities with maximal assistance within 7 day(s). 7.  Patient will utilize energy conservation and fall prevention techniques during functional activities with verbal cues within 7 day(s). 8.  Patient will improve their Fugl Harris score by 5 points in prep for ADLs within 7 days. Outcome: Progressing Towards Goal   OCCUPATIONAL THERAPY TREATMENT  Patient: Eric Randall (22 y.o. female)  Date: 5/24/2022  Diagnosis: Left-sided weakness [R53.1] <principal problem not specified>       Precautions: Fall  Chart, occupational therapy assessment, plan of care, and goals were reviewed. ASSESSMENT  Patient continues with skilled OT services and is progressing towards goals. Patient received resting in bed, agreeable to session. With encouragement, patient attempts to don R sock in L sidelying, flexing at hip and knee and utilizing R hand to don R sock. Patient participates in EOB activity focused on midline orientation and posture.  Patient initially with fair balance and using RUE grasp of bedrail for balance, however with stool under B feet (patient's feet unable to reach floor at bed's lowest height), patient balance improves to CGA with patient able to briefly maintain midline sitting. After several minutes, patient reporting \"hot flash\" symptoms and requesting return to supine. Unable to obtain EOB BP,  however /74 upon return to supine. Patient is positioned for comfort with heels floated and LUE elevated. Continue to recommend IPR as patient well below baseline but with excellent rehab potential.    Current Level of Function Impacting Discharge (ADLs): min A bed mobility; up to max A self-care    Other factors to consider for discharge:          PLAN :  Patient continues to benefit from skilled intervention to address the above impairments. Continue treatment per established plan of care to address goals. Recommendation for discharge: (in order for the patient to meet his/her long term goals)  Therapy 3 hours per day 5-7 days per week    This discharge recommendation:  Has been made in collaboration with the attending provider and/or case management    IF patient discharges home will need the following DME: TBD       SUBJECTIVE:   Patient stated tengo calor.     OBJECTIVE DATA SUMMARY:   Cognitive/Behavioral Status:  Neurologic State: Alert  Orientation Level: Oriented to person;Oriented to place  Cognition: Follows commands  Perception: Cues to maintain midline in sitting;Verbal;Cues to attend to left side of body  Perseveration: No perseveration noted  Safety/Judgement: Decreased insight into deficits; Fall prevention    Functional Mobility and Transfers for ADLs:  Bed Mobility:  Rolling: Minimum assistance  Supine to Sit: Minimum assistance  Sit to Supine: Minimum assistance  Scooting: Minimum assistance    Balance:  Sitting: Impaired; Without support  Sitting - Static: Fair (occasional)  Sitting - Dynamic: Poor (constant support)    ADL Intervention:  Feeding  Feeding Assistance: Set-up  Drink to Mouth: Stand-by assistance    Lower Body Dressing Assistance  Socks: Stand-by assistance; Compensatory technique training (R sock using R hand)    Cognitive Retraining  Problem Solving: Identifying the problem  Safety/Judgement: Decreased insight into deficits; Fall prevention    Neuro Re-Education & Therapeutic Exercises:   Midline sitting orientation: Initially requires RUE grasp of bedrail with feet floating (unable to reach floor while EOB at lowest bed height). With stool under feet, patient able to maintain unsupported midline sitting for approx 5 seconds with CGA. Overall, patient movement less impulsive and momentum-reliant today. Composite grasp/release w/ L hand while EOB    Scapular retraction     Scapular elevation     Shoulder \"rolls\"    Cervical flexion and rotation    Pain:  Reports sore back/neck. Activity Tolerance:   Fair, requires rest breaks, and suspect orthostatic hypotension    After treatment patient left in no apparent distress:   Supine in bed, Heels elevated for pressure relief, Call bell within reach, Bed / chair alarm activated, and Side rails x 3    COMMUNICATION/COLLABORATION:   The patients plan of care was discussed with: Physical therapist and Registered nurse. Patient was educated regarding Her deficit(s). She demonstrated Fair understanding as evidenced by verbalization. Patient and/or family was verbally educated on the BE FAST acronym for signs/symptoms of CVA and TIA. BE FAST was written on patient's communication board  for visual education and reinforcement. All questions answered with patient indicating fair understanding.      Brigitte Segovia OT  Time Calculation: 24 mins

## 2022-05-24 NOTE — HOSPICE
[facility]  [phone]    Patient Name: Kandace Muhammad  YOB: 1955  Age: 78 y.o. [address] [facility] RN Note:  Plan of care discussed with patients nurse & care manager. Pt still meets Routine level of care and would be eligible for 100% coverage of home hospice. Her hospice order written on May 17 is good for two weeks and will need to be reordered on May 31. Nurse reports poor po intake, pt prefers snacks, not touching food on tray. Will call pts DENISE Coughlin ([phone]) to see if family can provide some foods from home.     Melissa Hooper, [facility]  [phone]

## 2022-05-24 NOTE — PROGRESS NOTES
Hospitalist Progress Note    NAME: Chadd Macdonald   :  1955   MRN:  [id number]   Room Number:  857/92  @ [facility]       Interim Hospital Summary: 78 y.o. 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  Intraparenchymal hemorrhage with edema and mass-effect POA  Mass-effect with 4 mm shift to admit for midline POA  Hypertension  Comfort measures POA  -Assessed by neurosurgery at outside hospital, no intervention recommended  -Assessed by neurology at outside hospital  -Assessed by palliative care medicine and patient made comfortable per family request in accordance patient wishes           -Haldol as needed for agitation  -Comfort measures only  -Morphine as needed for pain  -Continue metoprolol  -Lidocaine patch  -Hospice consulted  - Meclizine PRN for dizziness           There is no height or weight on file to calculate BMI. Code Status: DNR     Surrogate Decision Maker:     DVT Prophylaxis: Comfort measures   GI Prophylaxis: not indicated       Subjective:     Chief Complaint / Reason for Physician Visit  No acute issues. Discussed with RN events overnight. Review of Systems:  No fevers, chills, appetite change, cough, sputum production, shortness of breath, dyspnea on exertion, nausea, vomitting, diarrhea, constipation, chest pain, leg edema, abdominal pain, joint pain, rash, itching. Objective:     VITALS:   Last 24hrs VS reviewed since prior progress note. Most recent are:  Patient Vitals for the past 24 hrs:   Temp Pulse Resp BP SpO2   22 0855 97.1 °F (36.2 °C) 72 18 125/60 95 %   22 2030 98.2 °F (36.8 °C) 82 18 122/74 99 %     No intake or output data in the 24 hours ending 22 1409     PHYSICAL EXAM:  General: WD, WN. Alert, cooperative, no acute distress    EENT:  EOMI. Anicteric sclerae. MMM  Resp:  CTA bilaterally, no wheezing or rales.   No accessory muscle use  CV:  Regular  rhythm,  normal S1/S2, no murmurs rubs gallops, No edema  GI:  Soft, Non distended, Non tender. +Bowel sounds  Neurologic:  Alert and oriented X self  Psych:    Not anxious nor agitated  Skin:  No rashes. No jaundice    Reviewed most current lab test results and cultures  YES  Reviewed most current radiology test results   YES  Review and summation of old records today    NO  Reviewed patient's current orders and MAR    YES  PMH/SH reviewed - no change compared to H&P  ________________________________________________________________________  Care Plan discussed with:    Comments   Patient     Family      RN     Care Manager     Consultant                        Multidiciplinary team rounds were held today with , nursing, pharmacist and clinical coordinator. Patient's plan of care was discussed; medications were reviewed and discharge planning was addressed. ________________________________________________________________________  Total NON critical care TIME: 25   Minutes    Total CRITICAL CARE TIME Spent:   Minutes non procedure based      Comments   >50% of visit spent in counseling and coordination of care     ________________________________________________________________________  Marco A Mckeon MD     Procedures: see electronic medical records for all procedures/Xrays and details which were not copied into this note but were reviewed prior to creation of Plan. LABS:  I reviewed today's most current labs and imaging studies. Pertinent labs include:  No results for input(s): WBC, HGB, HCT, PLT, HGBEXT, HCTEXT, PLTEXT, HGBEXT, HCTEXT, PLTEXT in the last 72 hours. No results for input(s): NA, K, CL, CO2, GLU, BUN, CREA, CA, MG, PHOS, ALB, TBIL, TBILI, ALT, INR, INREXT, INREXT in the last 72 hours.     No lab exists for component: SGOT    Signed: Marco A Mckeon MD

## 2022-05-24 NOTE — PROGRESS NOTES
Spiritual Care Partner Volunteer visited patient at [facility] in [address] on 5/24/2022   Documented by:     ALEXSANDRA Calvin, [facility], Staff [address]    [address] Paging Service  [phone]

## 2022-05-24 NOTE — PROGRESS NOTES
Problem: Mobility Impaired (Adult and Pediatric)  Goal: *Acute Goals and Plan of Care (Insert Text)  Description:   FUNCTIONAL STATUS PRIOR TO ADMISSION: Patient was independent and active without use of DME.    HOME SUPPORT PRIOR TO ADMISSION: The patient lived with family but did not require assist.    Physical Therapy Goals  Initiated 5/20/2022  1. Patient will move from supine to sit and sit to supine  in bed with minimal assistance/contact guard assist within 7 day(s). 2.  Patient will transfer from bed to chair and chair to bed with moderate assistance  using the least restrictive device within 7 day(s). 3.  Patient will perform sit to stand with minimal assistance/contact guard assist within 7 day(s). 4.  Patient will ambulate with moderate assistance  for 20 feet with the least restrictive device within 7 day(s). 6.  Patient will improve Peralta Balance score by 7 points within 7 days. Outcome: Progressing Towards Goal   PHYSICAL THERAPY TREATMENT  Patient: Trevon Wells (67 y.o. female)  Date: 5/24/2022  Diagnosis: Left-sided weakness [R53.1] <principal problem not specified>       Precautions: Fall  Chart, physical therapy assessment, plan of care and goals were reviewed. ASSESSMENT  Patient continues with skilled PT services and is progressing towards goals. She reports fatigue and upper trap/back pain. Patient also reports feeling her heart race and SOB with minimal activity. O2 sats are stable on RA. Patient transitions supine to sit with HOB elevated and Min A. She maintains sitting balance with R UE support. Bed height is elevated and patient achieves foot flat with stool placed underneath her feet. She demonstrates the ability to maintain static sitting balance without UE support for 3-5 seconds. Patient begins to report feeling hot and dizzy. O2 sats are stable. Patient is unable to stay seated long enough for BP to run. BP is 114/74 in supine.  Patient is provided Max A for scooting up in bed. She is able to pull herself forward for pillow placement. Current Level of Function Impacting Discharge (mobility/balance): Min- Mod A supine<>sit, bed mobility     Other factors to consider for discharge: medical stability, increased need for assistance, inability to ambulate or transfer at this time          PLAN :  Patient continues to benefit from skilled intervention to address the above impairments. Continue treatment per established plan of care. to address goals. Recommendation for discharge: (in order for the patient to meet his/her long term goals)  Therapy 3 hours per day 5-7 days per week    This discharge recommendation:  Has been made in collaboration with the attending provider and/or case management    IF patient discharges home will need the following DME: to be determined (TBD)       SUBJECTIVE:   Patient stated my back and my shoulders.     OBJECTIVE DATA SUMMARY:   Critical Behavior:  Neurologic State: Alert  Orientation Level: Oriented to person,Oriented to place,Oriented to time,Oriented to situation  Cognition: Follows commands  Safety/Judgement: Decreased insight into deficits,Fall prevention  Functional Mobility Training:  Bed Mobility:  Rolling: Minimum assistance  Supine to Sit: Minimum assistance  Sit to Supine: Minimum assistance  Scooting: Minimum assistance        Transfers:                                   Balance:  Sitting: Impaired; Without support  Sitting - Static: Fair (occasional)  Sitting - Dynamic: Poor (constant support)  Ambulation/Gait Training:                                                        Stairs:               Therapeutic Exercises:     Pain Rating:      Activity Tolerance:   Fair and signs and symptoms of orthostatic hypotension    After treatment patient left in no apparent distress:   Supine in bed, Call bell within reach, Bed / chair alarm activated, and Side rails x 3    COMMUNICATION/COLLABORATION:   The patients plan of care was discussed with: Occupational therapist and Registered nurse.      Ankit Mcwilliamselor, PT   Time Calculation: 23 mins

## 2022-05-24 NOTE — PROGRESS NOTES
Problem: Falls - Risk of  Goal: *Absence of Falls  Description: Document [place] Fall Risk and appropriate interventions in the flowsheet. Outcome: Progressing Towards Goal  Note: Fall Risk Interventions:  Mobility Interventions: Bed/chair exit alarm,Mechanical lift,PT Consult for mobility concerns    Mentation Interventions: Adequate sleep, hydration, pain control,Bed/chair exit alarm,Door open when patient unattended    Medication Interventions: Bed/chair exit alarm,Teach patient to arise slowly    Elimination Interventions: Call light in reach,Patient to call for help with toileting needs    History of Falls Interventions: Bed/chair exit alarm,Door open when patient unattended         Problem: Pressure Injury - Risk of  Goal: *Prevention of pressure injury  Description: Document Eugene Scale and appropriate interventions in the flowsheet.   Outcome: Progressing Towards Goal  Note: Pressure Injury Interventions:  Sensory Interventions: Assess changes in LOC,Float heels,Keep linens dry and wrinkle-free,Minimize linen layers    Moisture Interventions: Absorbent underpads,Apply protective barrier, creams and emollients,Minimize layers    Activity Interventions: PT/OT evaluation    Mobility Interventions: PT/OT evaluation,Float heels    Nutrition Interventions: Offer support with meals,snacks and hydration    Friction and Shear Interventions: Apply protective barrier, creams and emollients,Foam dressings/transparent film/skin sealants

## 2022-05-25 PROCEDURE — 97530 THERAPEUTIC ACTIVITIES: CPT

## 2022-05-25 PROCEDURE — 65270000032 HC RM SEMIPRIVATE

## 2022-05-25 PROCEDURE — 74011250637 HC RX REV CODE- 250/637: Performed by: STUDENT IN AN ORGANIZED HEALTH CARE EDUCATION/TRAINING PROGRAM

## 2022-05-25 RX ADMIN — ONDANSETRON 4 MG: 4 TABLET, ORALLY DISINTEGRATING ORAL at 11:21

## 2022-05-25 NOTE — PROGRESS NOTES
1945) Bedside and Verbal shift change report given to BOB Pleitez (oncoming nurse) by BOB Monahan (offgoing nurse). Report included the following information SBAR, Kardex, Intake/Output and MAR.      2136)  PM assessment done. .Patient is AOx4. VS taken. Patient stable at this time. Patient is in bed resting comfortably. Patient denies any pain at this time.  No distress noted. 2145) Patient was assisted to use the bedpan. Patient was continent of bladder with output of 300 ml.     2230) Patient was rounded on. Patient is resting comfortably in bed.     2350) Scheduled Lidocaine 4 % patch applied to the back. 0000) Neuro check done. Patient resting comfortably in bed.    0450) Patient was rounded on. Neuro check done. Patient resting comfortably in bed.    3844) Patient was assisted to use the bedpan. Patient was continent of bladder with output of 300 ml. New brief and bed pad in place. 0745) Bedside and Verbal shift change report given to BOB Mclain (oncoming nurse) by Shilo Preston (offgoing nurse). Report included the following information SBAR, Kardex, Intake/Output and MAR.

## 2022-05-25 NOTE — PROGRESS NOTES
Hospitalist Progress Note    NAME: Pauline Anne   :  1955   MRN:  [id number]   Room Number:  724/98  @ [facility]       Interim Hospital Summary: 78 y.o. 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  Intraparenchymal hemorrhage with edema and mass-effect POA  Mass-effect with 4 mm shift to admit for midline POA  Hypertension  Comfort measures POA  -Assessed by neurosurgery at outside hospital, no intervention recommended  -Assessed by neurology at outside hospital  -Assessed by palliative care medicine and patient made comfortable per family request in accordance patient wishes           -Haldol as needed for agitation  -Comfort measures only  -Morphine as needed for pain  -Continue metoprolol  -Lidocaine patch  -Hospice consulted  - Meclizine PRN for dizziness           There is no height or weight on file to calculate BMI. Code Status: DNR     Surrogate Decision Maker:     DVT Prophylaxis: Comfort measures   GI Prophylaxis: not indicated       Subjective:     Chief Complaint / Reason for Physician Visit  No acute issues. Discussed with RN events overnight. Review of Systems:  No fevers, chills, appetite change, cough, sputum production, shortness of breath, dyspnea on exertion, nausea, vomitting, diarrhea, constipation, chest pain, leg edema, abdominal pain, joint pain, rash, itching. Objective:     VITALS:   Last 24hrs VS reviewed since prior progress note. Most recent are:  Patient Vitals for the past 24 hrs:   Temp Pulse Resp BP SpO2   22 0813 98.5 °F (36.9 °C) 61 14 (!) 122/55 96 %   22 2136 97.5 °F (36.4 °C) 66 16 106/61 95 %   22 1433 -- -- 14 -- --       Intake/Output Summary (Last 24 hours) at 2022 0919  Last data filed at 2022 0640  Gross per 24 hour   Intake 300 ml   Output 1350 ml   Net -1050 ml        PHYSICAL EXAM:  General: WD, WN. Alert, cooperative, no acute distress    EENT:  EOMI. Anicteric sclerae. MMM  Resp:  CTA bilaterally, no wheezing or rales. No accessory muscle use  CV:  Regular  rhythm,  normal S1/S2, no murmurs rubs gallops, No edema  GI:  Soft, Non distended, Non tender. +Bowel sounds  Neurologic:  Alert and oriented X self  Psych:    Not anxious nor agitated  Skin:  No rashes. No jaundice    Reviewed most current lab test results and cultures  YES  Reviewed most current radiology test results   YES  Review and summation of old records today    NO  Reviewed patient's current orders and MAR    YES  PMH/SH reviewed - no change compared to H&P  ________________________________________________________________________  Care Plan discussed with:    Comments   Patient     Family      RN     Care Manager     Consultant                        Multidiciplinary team rounds were held today with , nursing, pharmacist and clinical coordinator. Patient's plan of care was discussed; medications were reviewed and discharge planning was addressed. ________________________________________________________________________  Total NON critical care TIME: 25   Minutes    Total CRITICAL CARE TIME Spent:   Minutes non procedure based      Comments   >50% of visit spent in counseling and coordination of care     ________________________________________________________________________  Whit Lema MD     Procedures: see electronic medical records for all procedures/Xrays and details which were not copied into this note but were reviewed prior to creation of Plan. LABS:  I reviewed today's most current labs and imaging studies. Pertinent labs include:  No results for input(s): WBC, HGB, HCT, PLT, HGBEXT, HCTEXT, PLTEXT, HGBEXT, HCTEXT, PLTEXT in the last 72 hours. No results for input(s): NA, K, CL, CO2, GLU, BUN, CREA, CA, MG, PHOS, ALB, TBIL, TBILI, ALT, INR, INREXT, INREXT in the last 72 hours.     No lab exists for component: SGOT    Signed: Perez Tsai MD

## 2022-05-25 NOTE — HOSPICE
Hospice Liaison Note:    Chart reviewed for updates in plan of care. Noted patient received PRN medication for Nausea. Call made to bedside team for update. Unable to speak with bedside nurse. Will try to reach bedside team tomorrow. Please call Hospice team to offer support for patient, family or staff.     Thank you for your coordination with the hospice plan of care      Florecita Roldan RN, [place] [id number] Nurse Liaison  [phone] [place]  [phone] Office

## 2022-05-25 NOTE — PROGRESS NOTES
Problem: Mobility Impaired (Adult and Pediatric)  Goal: *Acute Goals and Plan of Care (Insert Text)  Description:   FUNCTIONAL STATUS PRIOR TO ADMISSION: Patient was independent and active without use of DME.    HOME SUPPORT PRIOR TO ADMISSION: The patient lived with family but did not require assist.    Physical Therapy Goals  Initiated 5/20/2022  1. Patient will move from supine to sit and sit to supine  in bed with minimal assistance/contact guard assist within 7 day(s). 2.  Patient will transfer from bed to chair and chair to bed with moderate assistance  using the least restrictive device within 7 day(s). 3.  Patient will perform sit to stand with minimal assistance/contact guard assist within 7 day(s). 4.  Patient will ambulate with moderate assistance  for 20 feet with the least restrictive device within 7 day(s). 6.  Patient will improve Peralta Balance score by 7 points within 7 days. Outcome: Progressing Towards Goal     PHYSICAL THERAPY TREATMENT  Patient: Marlyn Lock (63 y.o. female)  Date: 5/25/2022  Diagnosis: Left-sided weakness [R53.1] <principal problem not specified>       Precautions: Fall  Chart, physical therapy assessment, plan of care and goals were reviewed. ASSESSMENT  Patient continues with skilled PT services and is progressing slowly towards goals. She remains limited by left sided weakness, impaired balance, awareness of neuro plasticity, c/o dizziness with OOB activity, and back pain while in bed. Received in bed and c/o mid thoracic pain (lidocaine patch on) and asking to reposition. She required minimal assist for bed mobility but completed with minimal use of her left side. She stated that it doesn't work when prompted and lifted her left side with her intact right. Generally discussed neuro plasticity with the patient via the  with understanding verbalized by the patient.  Noted mild improvement in left side utilization after discussion but continued to lift her left LE with the dorsum of her right foot. Facilitated squat pivot to a bedside chair requiring maximum assist to her right side and noted heavy bucking of the left knee. BP assessed throughout session with a quite slow drop in BP (~23 mmHg) in 10 minute increments. She c/o dizziness and feeling \"hot\" throughout the session leading to poor tolerance of sitting in the chair. Facilitated return to bed via squat pivot to her right again with maximum assistance and left in supine. The patient is far below her functional baseline and recommend discharge to IP rehab when medically ready. Current Level of Function Impacting Discharge (mobility/balance): maximum assist for OOB activity    Other factors to consider for discharge: limited tolerance of OOB activity         PLAN :  Patient continues to benefit from skilled intervention to address the above impairments. Continue treatment per established plan of care. to address goals. Recommendation for discharge: (in order for the patient to meet his/her long term goals)  Therapy 3 hours per day 5-7 days per week    This discharge recommendation:  Has been made in collaboration with the attending provider and/or case management    IF patient discharges home will need the following DME: to be determined (TBD)       SUBJECTIVE:   Patient stated I am so hot.  (throughout session regardless of position, stated her \"back\" was hot when attempted to clarify with )    OBJECTIVE DATA SUMMARY:   Critical Behavior:  Neurologic State: Alert  Orientation Level: Oriented to person,Oriented to place  Cognition: Follows commands  Safety/Judgement: Decreased insight into deficits,Fall prevention  Functional Mobility Training:  Bed Mobility:  Rolling: Minimum assistance  Supine to Sit: Minimum assistance  Sit to Supine: Minimum assistance  Scooting: Minimum assistance        Transfers:  Sit to Stand:  Moderate assistance  Stand to Sit: Moderate assistance        Bed to Chair: Maximum assistance                    Balance:  Sitting: Impaired; Without support  Sitting - Static: Fair (occasional)  Sitting - Dynamic: Poor (constant support)  Standing: Impaired; With support  Standing - Static: Constant support;Poor  Standing - Dynamic : Poor;Constant support  Therapeutic Exercises:   Encouraged left LE quad activation in sitting  Pain Rating:      Activity Tolerance:   Poor and ~20 mmHg drop in 10 minutes  Vitals:    05/25/22 0813 05/25/22 1020 05/25/22 1030 05/25/22 1040   BP: (!) 122/55 (!) 169/75 (!) 144/69 121/76   BP 1 Location: Right upper arm      BP Patient Position: Lying Supine Sitting Sitting   Pulse: 61 78 84 84   Temp: 98.5 °F (36.9 °C)      Resp: 14      SpO2: 96%            After treatment patient left in no apparent distress:   Supine in bed and Call bell within reach    COMMUNICATION/COLLABORATION:   The patients plan of care was discussed with: Registered nurse.      Jr Maloney PT, DPT   Time Calculation: 51 mins

## 2022-05-26 PROCEDURE — 74011250637 HC RX REV CODE- 250/637: Performed by: STUDENT IN AN ORGANIZED HEALTH CARE EDUCATION/TRAINING PROGRAM

## 2022-05-26 PROCEDURE — 65270000032 HC RM SEMIPRIVATE

## 2022-05-26 PROCEDURE — 74011000250 HC RX REV CODE- 250: Performed by: STUDENT IN AN ORGANIZED HEALTH CARE EDUCATION/TRAINING PROGRAM

## 2022-05-26 PROCEDURE — 97530 THERAPEUTIC ACTIVITIES: CPT

## 2022-05-26 RX ORDER — MORPHINE SULFATE 15 MG/1
15 TABLET ORAL
Status: DISCONTINUED | OUTPATIENT
Start: 2022-05-26 | End: 2022-06-22 | Stop reason: HOSPADM

## 2022-05-26 RX ORDER — MORPHINE SULFATE 15 MG/1
30 TABLET ORAL
Status: DISCONTINUED | OUTPATIENT
Start: 2022-05-26 | End: 2022-06-22 | Stop reason: HOSPADM

## 2022-05-26 RX ADMIN — METOPROLOL SUCCINATE 25 MG: 25 TABLET, EXTENDED RELEASE ORAL at 09:01

## 2022-05-26 RX ADMIN — IBUPROFEN 400 MG: 200 TABLET, FILM COATED ORAL at 09:01

## 2022-05-26 RX ADMIN — MORPHINE SULFATE 15 MG: 15 TABLET ORAL at 18:25

## 2022-05-26 NOTE — PROGRESS NOTES
Problem: Falls - Risk of  Goal: *Absence of Falls  Description: Document [place] Fall Risk and appropriate interventions in the flowsheet. Outcome: Progressing Towards Goal  Note: Fall Risk Interventions:  Mobility Interventions: Bed/chair exit alarm,Assess mobility with egress test,Communicate number of staff needed for ambulation/transfer,OT consult for ADLs,Patient to call before getting OOB,PT Consult for mobility concerns,PT Consult for assist device competence,Strengthening exercises (ROM-active/passive)    Mentation Interventions: Adequate sleep, hydration, pain control,Bed/chair exit alarm,Door open when patient unattended,Evaluate medications/consider consulting pharmacy,Eyeglasses and hearing aids,Familiar objects from home,Increase mobility,More frequent rounding,Reorient patient,Room close to nurse's station,Toileting rounds,Update white board    Medication Interventions: Assess postural VS orthostatic hypotension,Bed/chair exit alarm,Evaluate medications/consider consulting pharmacy,Patient to call before getting OOB,Teach patient to arise slowly    Elimination Interventions: Bed/chair exit alarm,Call light in reach,Elevated toilet seat,Patient to call for help with toileting needs,Toilet paper/wipes in reach,Toileting schedule/hourly rounds    History of Falls Interventions: Bed/chair exit alarm,Consult care management for discharge planning,Door open when patient unattended,Evaluate medications/consider consulting pharmacy,Room close to nurse's station,Utilize gait belt for transfer/ambulation,Assess for delayed presentation/identification of injury for 48 hrs (comment for end date)         Problem: Patient Education: Go to Patient Education Activity  Goal: Patient/Family Education  Outcome: Progressing Towards Goal     Problem: Pressure Injury - Risk of  Goal: *Prevention of pressure injury  Description: Document Eugene Scale and appropriate interventions in the flowsheet.   Outcome: Progressing Towards Goal  Note: Pressure Injury Interventions:  Sensory Interventions: Assess changes in LOC,Assess need for specialty bed,Check visual cues for pain,Discuss PT/OT consult with provider,Float heels,Keep linens dry and wrinkle-free,Maintain/enhance activity level,Minimize linen layers,Monitor skin under medical devices,Pad between skin to skin,Turn and reposition approx. every two hours (pillows and wedges if needed)    Moisture Interventions: Absorbent underpads,Apply protective barrier, creams and emollients,Check for incontinence Q2 hours and as needed,Limit adult briefs,Maintain skin hydration (lotion/cream),Minimize layers,Moisture barrier,Offer toileting Q_hr    Activity Interventions: PT/OT evaluation,Pressure redistribution bed/mattress(bed type),Increase time out of bed,Assess need for specialty bed,Chair cushion    Mobility Interventions: Assess need for specialty bed,Chair cushion,Float heels,HOB 30 degrees or less,Pressure redistribution bed/mattress (bed type),PT/OT evaluation,Turn and reposition approx.  every two hours(pillow and wedges)    Nutrition Interventions: Document food/fluid/supplement intake,Discuss nutritional consult with provider,Offer support with meals,snacks and hydration    Friction and Shear Interventions: Apply protective barrier, creams and emollients,Feet elevated on foot rest,Foam dressings/transparent film/skin sealants,HOB 30 degrees or less,Minimize layers                Problem: Patient Education: Go to Patient Education Activity  Goal: Patient/Family Education  Outcome: Progressing Towards Goal     Problem: Patient Education: Go to Patient Education Activity  Goal: Patient/Family Education  Outcome: Progressing Towards Goal     Problem: Patient Education: Go to Patient Education Activity  Goal: Patient/Family Education  Outcome: Progressing Towards Goal

## 2022-05-26 NOTE — PROGRESS NOTES
Problem: Self Care Deficits Care Plan (Adult)  Goal: *Acute Goals and Plan of Care (Insert Text)  Description: FUNCTIONAL STATUS PRIOR TO ADMISSION: Patient was independent and active without use of DME.    HOME SUPPORT: The patient lived with family but did not require assist.    Occupational Therapy Goals  Initiated 5/23/2022   1. Patient will perform seated grooming with minimal assistance  within 7 day(s). 2.  Patient will perform upper body dressing with moderate assistance  within 7 day(s). 3.  Patient will perform lower body dressing with moderate assistance within 7 day(s). 4.  Patient will perform toilet transfers to [facility] with moderate assistance within 7 day(s). 5.  Patient will perform all aspects of toileting with moderate assistance  within 7 day(s). 6.  Patient will participate in upper extremity therapeutic exercise/activities with maximal assistance within 7 day(s). 7.  Patient will utilize energy conservation and fall prevention techniques during functional activities with verbal cues within 7 day(s). 8.  Patient will improve their Fugl Harris score by 5 points in prep for ADLs within 7 days. Outcome: Progressing Towards Goal     OCCUPATIONAL THERAPY TREATMENT  Patient: [place] Tipton (48 y.o. female)  Date: 5/26/2022  Diagnosis: Left-sided weakness [R53.1] <principal problem not specified>       Precautions: Fall  Chart, occupational therapy assessment, plan of care, and goals were reviewed. ASSESSMENT  Patient continues with skilled OT services and is progressing towards goals. Pt noted with progressive awareness to LUE, evidenced by pt engaging in LUE self-ROM upon Ot's arrival to room, with pt educated on AAROM to LUE and good engagement noted at Supervision level. Pt noted with good participation; however, high c/o dizziness at EOB limited output, with BP stable. Pt with good engagement with sit to stand from EOB at Max Ax2.   Pt continues to benefit from skilled OT to address functional deficits during acute hospitalization, with reporting therapist believing pt would benefit from rehab upon discharge, with acute OT to continue to follow. Of note, interpretation services used throughout session. Current Level of Function Impacting Discharge (ADLs): Up to Total A    Other factors to consider for discharge: Max Ax2 sit to stand         PLAN :  Patient continues to benefit from skilled intervention to address the above impairments. Continue treatment per established plan of care to address goals. Recommend with staff: Frequent positional changes, Active bed level ADL engagement    Recommend next OT session: POC progression    Recommendation for discharge: (in order for the patient to meet his/her long term goals)  To be determined: rehab    This discharge recommendation:  Has been made in collaboration with the attending provider and/or case management    IF patient discharges home will need the following DME: TBD       SUBJECTIVE:   Patient stated Wu.     OBJECTIVE DATA SUMMARY:   Cognitive/Behavioral Status:  Neurologic State: Alert  Orientation Level: Oriented to person;Oriented to place  Cognition: Follows commands  Perception: Cues to maintain midline in sitting  Perseveration: No perseveration noted  Safety/Judgement: Decreased insight into deficits    Functional Mobility and Transfers for ADLs:  Bed Mobility:  Rolling: Minimum assistance  Supine to Sit: Minimum assistance  Sit to Supine: Minimum assistance  Scooting: Minimum assistance    Transfers:  Sit to Stand: Maximum assistance;Assist x2      Balance:  Sitting: Impaired; Without support  Sitting - Static: Fair (occasional)  Sitting - Dynamic: Fair (occasional); Poor (constant support)  Standing: Impaired; With support  Standing - Static: Constant support;Poor  Standing - Dynamic : Constant support;Poor    ADL Intervention:  Cognitive Retraining  Safety/Judgement: Decreased insight into deficits    Therapeutic Activities:   Educated on benefits of AAROM to LUE rather than self-ROM; good understanding verbalized/demonstrated. Pt provided with red foam block and education on digital flexion/extension and bilateral integration challenges; good engagement noted. Pain:  C/o L shoulder and back pain; RN aware and following    Activity Tolerance:   Fair and requires frequent rest breaks    After treatment patient left in no apparent distress:   Supine in bed, Call bell within reach, Bed / chair alarm activated, and Side rails x 3    COMMUNICATION/COLLABORATION:   The patients plan of care was discussed with: Physical therapist, Registered nurse, and Case management.      [place], OT  Time Calculation: 32 mins

## 2022-05-26 NOTE — PROGRESS NOTES
Hospitalist Progress Note    NAME: Henna Enriquez   :  1955   MRN:  [id number]   Room Number:  684/76  @ [facility]       Interim Hospital Summary: 78 y.o. 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  Intraparenchymal hemorrhage with edema and mass-effect POA  Mass-effect with 4 mm shift to admit for midline POA  Hypertension  Comfort measures POA  -Assessed by neurosurgery at outside hospital, no intervention recommended  -Assessed by neurology at outside hospital  -Assessed by palliative care medicine and patient made comfortable per family request in accordance patient wishes           -Haldol as needed for agitation  -Comfort measures only  -Morphine as needed for pain  -Continue metoprolol  -Lidocaine patch  -Hospice consulted  - Meclizine PRN for dizziness           There is no height or weight on file to calculate BMI. Code Status: DNR     Surrogate Decision Maker:     DVT Prophylaxis: Comfort measures   GI Prophylaxis: not indicated       Subjective:     Chief Complaint / Reason for Physician Visit  No acute issues. Discussed with RN events overnight. Review of Systems:  No fevers, chills, appetite change, cough, sputum production, shortness of breath, dyspnea on exertion, nausea, vomitting, diarrhea, constipation, chest pain, leg edema, abdominal pain, joint pain, rash, itching. Objective:     VITALS:   Last 24hrs VS reviewed since prior progress note.  Most recent are:  Patient Vitals for the past 24 hrs:   Temp Pulse Resp BP SpO2   22 0901 -- 85 -- -- --   22 0859 98 °F (36.7 °C) 80 16 (!) 128/47 98 %   22 97.3 °F (36.3 °C) 76 16 138/72 95 %   22 1040 -- 84 -- 121/76 --   22 1030 -- 84 -- (!) 144/69 --   22 1020 -- 78 -- (!) 169/75 --       Intake/Output Summary (Last 24 hours) at 2022 0935  Last data filed at 2022 1020  Gross per 24 hour   Intake --   Output 400 ml   Net -400 ml        PHYSICAL EXAM:  General: WD, WN. Alert, cooperative, no acute distress    EENT:  EOMI. Anicteric sclerae. MMM  Resp:  CTA bilaterally, no wheezing or rales. No accessory muscle use  CV:  Regular  rhythm,  normal S1/S2, no murmurs rubs gallops, No edema  GI:  Soft, Non distended, Non tender. +Bowel sounds  Neurologic:  Alert and oriented X self  Psych:    Not anxious nor agitated  Skin:  No rashes. No jaundice    Reviewed most current lab test results and cultures  YES  Reviewed most current radiology test results   YES  Review and summation of old records today    NO  Reviewed patient's current orders and MAR    YES  PMH/SH reviewed - no change compared to H&P  ________________________________________________________________________  Care Plan discussed with:    Comments   Patient     Family      RN     Care Manager     Consultant                        Multidiciplinary team rounds were held today with , nursing, pharmacist and clinical coordinator. Patient's plan of care was discussed; medications were reviewed and discharge planning was addressed. ________________________________________________________________________  Total NON critical care TIME: 25   Minutes    Total CRITICAL CARE TIME Spent:   Minutes non procedure based      Comments   >50% of visit spent in counseling and coordination of care     ________________________________________________________________________  Whit Lema MD     Procedures: see electronic medical records for all procedures/Xrays and details which were not copied into this note but were reviewed prior to creation of Plan. LABS:  I reviewed today's most current labs and imaging studies. Pertinent labs include:  No results for input(s): WBC, HGB, HCT, PLT, HGBEXT, HCTEXT, PLTEXT, HGBEXT, HCTEXT, PLTEXT in the last 72 hours.   No results for input(s): NA, K, CL, CO2, GLU, BUN, CREA, CA, MG, PHOS, ALB, TBIL, TBILI, ALT, INR, INREXT, INREXT in the last 72 hours.     No lab exists for component: SGOT    Signed: Jessica Barnett MD

## 2022-05-26 NOTE — PROGRESS NOTES
1930:  Verbal shift change report given to Marcie Norton RN (oncoming nurse) by Joseline Jenkins RN (offgoing nurse). Report included the following information SBAR, MAR and Recent Results. 0000: Lidocaine patch applied, no s/s of distress noted, denies pain or discomfort.

## 2022-05-26 NOTE — PROGRESS NOTES
Problem: Mobility Impaired (Adult and Pediatric)  Goal: *Acute Goals and Plan of Care (Insert Text)  Description:   FUNCTIONAL STATUS PRIOR TO ADMISSION: Patient was independent and active without use of DME.    HOME SUPPORT PRIOR TO ADMISSION: The patient lived with family but did not require assist.    Physical Therapy Goals  Initiated 5/20/2022  1. Patient will move from supine to sit and sit to supine  in bed with minimal assistance/contact guard assist within 7 day(s). 2.  Patient will transfer from bed to chair and chair to bed with moderate assistance  using the least restrictive device within 7 day(s). 3.  Patient will perform sit to stand with minimal assistance/contact guard assist within 7 day(s). 4.  Patient will ambulate with moderate assistance  for 20 feet with the least restrictive device within 7 day(s). 6.  Patient will improve Peralta Balance score by 7 points within 7 days. Outcome: Progressing Towards Goal     PHYSICAL THERAPY TREATMENT  Patient: Genia Moore (54 y.o. female)  Date: 5/26/2022  Diagnosis: Left-sided weakness [R53.1] <principal problem not specified>       Precautions: Fall  Chart, physical therapy assessment, plan of care and goals were reviewed. ASSESSMENT  Patient continues with skilled PT services and is progressing towards goals. Utilized video assisted Ukrainian  for communication but challenged with clarity of communication at times d/t delayed or no response by patient. Activity tolerance improved overall this date but continued to notice a tendency towards learned non-use of the left side. Reinforced the need to actively utilize the muscles on her left side to improve coordination but to avoid pain with ROM to prevent injury. She verbalized understanding and showed carry over when prompted. Facilitated transfer to EOB with fair to poor sitting balance initially that improved with reaching and BLE support on a stool at bedside. Completed 2 standing trials (~45-60 seconds) requiring maximum assist x2 to stand fully erect and maintain stance. Level of assist indicated is higher than prior sessions but this is d/t improved ability to weight shifted forward and away from the bed with a 2nd person instead of increased patient difficulty. Patient able to achieve full upright posture with left knee extension before fatiguing and returning to EOB. Recommend discharge to rehab when medically stable. Current Level of Function Impacting Discharge (mobility/balance): maximum assist x2 for prolonged standing with erect posture    Other factors to consider for discharge: language barrier         PLAN :  Patient continues to benefit from skilled intervention to address the above impairments. Continue treatment per established plan of care. to address goals. Recommendation for discharge: (in order for the patient to meet his/her long term goals)  Therapy up to 5 days/week in SNF setting    This discharge recommendation:  Has been made in collaboration with the attending provider and/or case management    IF patient discharges home will need the following DME: to be determined (TBD)       SUBJECTIVE:   Patient stated I hurt all over.     OBJECTIVE DATA SUMMARY:   Critical Behavior:  Neurologic State: Alert  Orientation Level: Oriented to person,Oriented to place  Cognition: Follows commands  Safety/Judgement: Decreased insight into deficits,Fall prevention  Functional Mobility Training:  Bed Mobility:  Rolling: Minimum assistance  Supine to Sit: Minimum assistance  Sit to Supine: Minimum assistance  Scooting: Minimum assistance        Transfers:  Sit to Stand: Maximum assistance;Assist x2  Stand to Sit: Maximum assistance;Assist x2;Assist x1                       Interventions: Tactile cues; Verbal cues; Visual cues; Weight shifting training/Pressure relief     Balance:  Sitting: Impaired; Without support  Sitting - Static: Fair (occasional)  Sitting - Dynamic: Fair (occasional); Poor (constant support)  Standing: Impaired; With support  Standing - Static: Constant support;Poor  Standing - Dynamic : Constant support;Poor  Ambulation/Gait Training:         Neuro Exercises:   Unsupported reaching outside of DL  Weight shifts    Pain Rating:      Activity Tolerance:   Fair    After treatment patient left in no apparent distress:   Sitting in chair and Call bell within reach    COMMUNICATION/COLLABORATION:   The patients plan of care was discussed with: Registered nurse.      Felipe Score, PT, DPT   Time Calculation: 32 mins

## 2022-05-26 NOTE — PROGRESS NOTES
Problem: Falls - Risk of  Goal: *Absence of Falls  Description: Document Kole Hunt Fall Risk and appropriate interventions in the flowsheet. Outcome: Progressing Towards Goal  Note: Fall Risk Interventions:  Mobility Interventions: Bed/chair exit alarm    Mentation Interventions: Adequate sleep, hydration, pain control    Medication Interventions: Evaluate medications/consider consulting pharmacy    Elimination Interventions: Call light in reach    History of Falls Interventions: Bed/chair exit alarm         Problem: Pressure Injury - Risk of  Goal: *Prevention of pressure injury  Description: Document Eugene Scale and appropriate interventions in the flowsheet.   Outcome: Progressing Towards Goal  Note: Pressure Injury Interventions:  Sensory Interventions: Assess changes in LOC    Moisture Interventions: Absorbent underpads    Activity Interventions: PT/OT evaluation    Mobility Interventions: PT/OT evaluation    Nutrition Interventions: Document food/fluid/supplement intake    Friction and Shear Interventions: Apply protective barrier, creams and emollients

## 2022-05-26 NOTE — PROGRESS NOTES
200- patient seen and assessed. Resting in bed while eating breakfast. C/o pain to left shoulder but unable to state a specific number. Provided with blankets. Will continue to monitor, call light in reach. 2066- given prn mortin 400 mg po of l sided shoulder pain. 1035- rounds completed with MD zuniga. disucss pain management to adjust patient meds and patient experience loneliness will attempt to find [address] [place] [address] speaking staff for [phone] ED staff came to floor to speak with patient in [address] [facility]. Patient sleeping at this time, staff to return later    1320- patient resting in bed provided heat packs for comfort. Denies continued pain to left shoulder. Per patient medications helped a lot. 1500- patient brief changed and used bedpan. Washed face and repositioned in bed. Patient reports no pain at this time. 1820- patient bathed, lotion applied and brief changed. Patient repositioned in bed for comfort. Patient c/o left shoulder pain unable to verbalize number but using facial expressions.

## 2022-05-27 PROCEDURE — 97530 THERAPEUTIC ACTIVITIES: CPT

## 2022-05-27 PROCEDURE — 97535 SELF CARE MNGMENT TRAINING: CPT

## 2022-05-27 PROCEDURE — 65270000032 HC RM SEMIPRIVATE

## 2022-05-27 PROCEDURE — 74011000250 HC RX REV CODE- 250: Performed by: STUDENT IN AN ORGANIZED HEALTH CARE EDUCATION/TRAINING PROGRAM

## 2022-05-27 PROCEDURE — 74011250637 HC RX REV CODE- 250/637: Performed by: STUDENT IN AN ORGANIZED HEALTH CARE EDUCATION/TRAINING PROGRAM

## 2022-05-27 RX ADMIN — ACETAMINOPHEN 650 MG: 325 TABLET ORAL at 09:18

## 2022-05-27 RX ADMIN — METOPROLOL SUCCINATE 25 MG: 25 TABLET, EXTENDED RELEASE ORAL at 08:57

## 2022-05-27 NOTE — PROGRESS NOTES
Problem: Self Care Deficits Care Plan (Adult)  Goal: *Acute Goals and Plan of Care (Insert Text)  Description: FUNCTIONAL STATUS PRIOR TO ADMISSION: Patient was independent and active without use of DME.    HOME SUPPORT: The patient lived with family but did not require assist.    Occupational Therapy Goals  Initiated 5/23/2022   1. Patient will perform seated grooming with minimal assistance  within 7 day(s). 2.  Patient will perform upper body dressing with moderate assistance  within 7 day(s). 3.  Patient will perform lower body dressing with moderate assistance within 7 day(s). 4.  Patient will perform toilet transfers to [facility] with moderate assistance within 7 day(s). 5.  Patient will perform all aspects of toileting with moderate assistance  within 7 day(s). 6.  Patient will participate in upper extremity therapeutic exercise/activities with maximal assistance within 7 day(s). 7.  Patient will utilize energy conservation and fall prevention techniques during functional activities with verbal cues within 7 day(s). 8.  Patient will improve their Fugl Harris score by 5 points in prep for ADLs within 7 days. Outcome: Progressing Towards Goal  OCCUPATIONAL THERAPY TREATMENT  Patient: Genia Moore (15 y.o. female)  Date: 5/27/2022  Diagnosis: Left-sided weakness [R53.1] <principal problem not specified>       Precautions: Fall  Chart, occupational therapy assessment, plan of care, and goals were reviewed. ASSESSMENT  Patient continues with skilled OT services and is progressing towards goals. Patient is received resting in bed, agreeable to session. Patient continues to progress with regards to sitting balance and functional sit<>stand transfer. Patient mobilizes to EOB with overall min assist, benefiting from min cues for safety as patient at times using momentum to facilitate weight-shifting and with seemingly decreased understanding of increased fall risk.  Patient participates in lower body dressing activity while seated EOB, threading LLE then RLE with mod assist. Patient manages to pull undergarment up to mid-thigh via lateral weightshifting, however requires up to max assist to pull up over hips in supported standing. Patient participates in multiple trials sit<> prep for increased safety and independence in [facility] toileting and OOB activity. She transfers to [facility] with max assist x2, then performing stand with mod assist x2 and sustaining position briefly with min-mod assist x2 and cues to attempt equal weight-bearing through LEs. Throughout session, patient continues to demonstrate good problem-solving, trialing alternative solutions for bimanual tasks. Overall, patient exhibits increasing spontaneous LUE movement, however she benefits from cues for safe positioning and attempting active LUE use prior to facilitating via RUE. Continue to recommend IPR at discharge. Current Level of Function Impacting Discharge (ADLs): assist x2 for sit<>stand; mod assist to thread LEs through brief while EOB w/ max assist over hips in supported standing    Other factors to consider for discharge: very motivated, independent baseline, good problem-solving         PLAN :  Patient continues to benefit from skilled intervention to address the above impairments. Continue treatment per established plan of care to address goals. Recommendation for discharge: (in order for the patient to meet his/her long term goals)  Therapy 3 hours per day 5-7 days per week    This discharge recommendation:  Has been made in collaboration with the attending provider and/or case management    IF patient discharges home will need the following DME: TBD       SUBJECTIVE:   Patient stated Valma Nose tardes\" at end of session. OBJECTIVE DATA SUMMARY:   Cognitive/Behavioral Status:  Neurologic State: Alert; Appropriate for age  Orientation Level: Oriented to person;Oriented to place;Oriented to situation  Cognition: Follows commands;Decreased attention/concentration;Poor safety awareness; Impulsive  Perception: Cues to maintain midline in sitting;Cues to maintain midline in standing;Cues to attend to left side of body;Verbal;Visual;Tactile  Perseveration: No perseveration noted  Safety/Judgement: Decreased insight into deficits; Decreased awareness of need for safety;Decreased awareness of need for assistance;Decreased awareness of environment    Functional Mobility and Transfers for ADLs:  Bed Mobility:  Rolling: Contact guard assistance  Supine to Sit: Minimum assistance  Sit to Supine: Minimum assistance    Transfers:  Sit to Stand: Moderate assistance;Assist x2     Bed to Chair: Maximum assistance;Assist x2    Balance:  Sitting: Impaired  Sitting - Static: Fair (occasional)  Sitting - Dynamic: Fair (occasional); Poor (constant support)  Standing: Impaired  Standing - Static: Poor  Standing - Dynamic : Poor;Constant support    ADL Intervention:  Feeding  Feeding Assistance: Set-up  Drink to Mouth: Supervision    Lower Body Dressing Assistance  Underpants: Maximum assistance; Compensatory technique training  Position Performed: Seated edge of bed;Standing  Cues: Verbal cues provided;Visual cues provided;Physical assistance    Cognitive Retraining  Orientation Retraining: Awareness of environment; Reorienting  Problem Solving: Identifying the problem;General alternative solution  Organizing/Sequencing: Breaking task down  Safety/Judgement: Decreased insight into deficits; Decreased awareness of need for safety;Decreased awareness of need for assistance;Decreased awareness of environment    Neuromuscular Re-Education & Therapeutic Exercises:   Midline sitting orientation: Patient with improved sitting balance today, maintaining approximate midline with decreased reliance on UE support.   Patient with decreased safety awareness and benefits from cues for safety as patient with tendency to utilize momentum and seemingly unaware of risks of scooting too close to EOB. Composite grasp/release w/ L hand while EOB     Scapular retraction      Scapular elevation     Shoulder \"rolls\"     Cervical flexion and rotation    Functional LUE use w/ ADL engagement    LUE positioning and weightbearing w/ sit<>stand    BLE weight-bearing in supported standing    Pain:  Reports mild neck pain, improved with repositioning. Activity Tolerance:   Good, Fair, and requires rest breaks    After treatment patient left in no apparent distress:   Supine in bed, Heels elevated for pressure relief, Call bell within reach, Bed / chair alarm activated, and Side rails x 3    COMMUNICATION/COLLABORATION:   The patients plan of care was discussed with: Physical therapist and Registered nurse.      Elisabeth Sharp OT  Time Calculation: 35 mins

## 2022-05-27 NOTE — PROGRESS NOTES
Hospitalist Progress Note    NAME: Crystal Roy   :  1955   MRN:  [id number]   Room Number:  315/25  @ [address] [facility]       Interim Hospital Summary: 78 y.o. 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  Intraparenchymal hemorrhage with edema and mass-effect POA  Mass-effect with 4 mm shift to admit for midline POA  Hypertension  Comfort measures POA  -Assessed by neurosurgery at outside hospital, no intervention recommended  -Assessed by neurology at outside hospital  -Assessed by palliative care medicine and patient made comfortable per family request in accordance patient wishes           -Haldol as needed for agitation  -Comfort measures only  -Morphine as needed for pain  -Continue metoprolol  -Lidocaine patch  -Hospice consulted  - Meclizine PRN for dizziness           There is no height or weight on file to calculate BMI. Code Status: DNR     Surrogate Decision Maker:     DVT Prophylaxis: Comfort measures   GI Prophylaxis: not indicated       Subjective:     Chief Complaint / Reason for Physician Visit  No acute issues. Discussed with RN events overnight. Review of Systems:  No fevers, chills, appetite change, cough, sputum production, shortness of breath, dyspnea on exertion, nausea, vomitting, diarrhea, constipation, chest pain, leg edema, abdominal pain, joint pain, rash, itching. Objective:     VITALS:   Last 24hrs VS reviewed since prior progress note. Most recent are:  Patient Vitals for the past 24 hrs:   Temp Pulse Resp BP SpO2   22 0739 96.9 °F (36.1 °C) 70 15 111/60 96 %   22 98 °F (36.7 °C) 72 16 129/61 96 %   22 0901 -- 85 -- -- --       Intake/Output Summary (Last 24 hours) at 2022 0900  Last data filed at 2022  Gross per 24 hour   Intake 240 ml   Output --   Net 240 ml        PHYSICAL EXAM:  General: WD, WN.  Alert, cooperative, no acute distress    EENT:  EOMI. Anicteric sclerae. MMM  Resp:  CTA bilaterally, no wheezing or rales. No accessory muscle use  CV:  Regular  rhythm,  normal S1/S2, no murmurs rubs gallops, No edema  GI:  Soft, Non distended, Non tender. +Bowel sounds  Neurologic:  Alert and oriented X self  Psych:    Not anxious nor agitated  Skin:  No rashes. No jaundice    Reviewed most current lab test results and cultures  YES  Reviewed most current radiology test results   YES  Review and summation of old records today    NO  Reviewed patient's current orders and MAR    YES  PMH/SH reviewed - no change compared to H&P  ________________________________________________________________________  Care Plan discussed with:    Comments   Patient     Family      RN     Care Manager     Consultant                        Multidiciplinary team rounds were held today with , nursing, pharmacist and clinical coordinator. Patient's plan of care was discussed; medications were reviewed and discharge planning was addressed. ________________________________________________________________________  Total NON critical care TIME: 25   Minutes    Total CRITICAL CARE TIME Spent:   Minutes non procedure based      Comments   >50% of visit spent in counseling and coordination of care     ________________________________________________________________________  Cheryl Moss MD     Procedures: see electronic medical records for all procedures/Xrays and details which were not copied into this note but were reviewed prior to creation of Plan. LABS:  I reviewed today's most current labs and imaging studies. Pertinent labs include:  No results for input(s): WBC, HGB, HCT, PLT, HGBEXT, HCTEXT, PLTEXT, HGBEXT, HCTEXT, PLTEXT in the last 72 hours. No results for input(s): NA, K, CL, CO2, GLU, BUN, CREA, CA, MG, PHOS, ALB, TBIL, TBILI, ALT, INR, INREXT, INREXT in the last 72 hours.     No lab exists for component: SGOT    Signed: Antoinette Hernandez MD

## 2022-05-27 NOTE — PROGRESS NOTES
0720- shift report given to rn lyla by keren smallwood. Discussing overnight events sbar and karadex. Patient resting comfortably in bed, call bell within reach. 2893-  Patient seen and assessment completed. Patient refuses to use . assisted patient with bedpan and repositioned in bed. Patient reports not eating much and right shoulder pain 3/10. Denies nausea or stomach pain. Assessment difficulty as rn understands immediate level of [place]. Resting comfortably in bed and call bell within reach. 0920- Given prn 650 mg tylenol for L. Shoulder pain 3/10 reports soreness    1139- PT working with patient at bedside. 1912- patient bathed and linens changed. Family at bedside.  Denies pain

## 2022-05-27 NOTE — PROGRESS NOTES
Problem: Mobility Impaired (Adult and Pediatric)  Goal: *Acute Goals and Plan of Care (Insert Text)  Description:   FUNCTIONAL STATUS PRIOR TO ADMISSION: Patient was independent and active without use of DME.    HOME SUPPORT PRIOR TO ADMISSION: The patient lived with family but did not require assist.    Physical Therapy Goals  Initiated 5/20/2022  1. Patient will move from supine to sit and sit to supine  in bed with minimal assistance/contact guard assist within 7 day(s). 2.  Patient will transfer from bed to chair and chair to bed with moderate assistance  using the least restrictive device within 7 day(s). 3.  Patient will perform sit to stand with minimal assistance/contact guard assist within 7 day(s). 4.  Patient will ambulate with moderate assistance  for 20 feet with the least restrictive device within 7 day(s). 6.  Patient will improve P[place]ta Balance score by 7 points within 7 days. Outcome: Progressing Towards Goal   PHYSICAL THERAPY TREATMENT  Patient: Sabas Carolina (59 y.o. female)  Date: 5/27/2022  Diagnosis: Left-sided weakness [R53.1] <principal problem not specified>       Precautions: Fall  Chart, physical therapy assessment, plan of care and goals were reviewed. ASSESSMENT  Patient continues with skilled PT services and is progressing towards goals. Pt received supine in bed and agreeable to working with therapy with encouragement. Pt continues with L sided weakness, neglect and inattention. Comes to sitting EOB with Min A, increased time and using RUE to move LLE off the EOB. Transferred to [facility] with Max A x 2 d/t poor technique and large step away from bed. Pt completed 4 additional stands from the chair with A x 2 to correct for poor body awareness and impaired balance. As little as Mod A x 1 + Min A x 1 to maintain standing balance. Pt reporting difficulty catching breath with activity though vitals remained stable.  Displays improved active movement of LUE though pt continues to use RUE to move LUE when asked to perform gentle ROM. Returned to supine at end of session with all needs in reach. Current Level of Function Impacting Discharge (mobility/balance): Max A x 2    Other factors to consider for discharge: high fall risk, [place] speaking with little desire to use Stratus         PLAN :  Patient continues to benefit from skilled intervention to address the above impairments. Continue treatment per established plan of care. to address goals. Recommendation for discharge: (in order for the patient to meet his/her long term goals)  Therapy 5-7 days per week    This discharge recommendation:  Has been made in collaboration with the attending provider and/or case management    IF patient discharges home will need the following DME: to be determined (TBD)       SUBJECTIVE:   Patient stated I need the Pampers.     OBJECTIVE DATA SUMMARY:   Critical Behavior:  Neurologic State: Alert,Appropriate for age,Eyes open spontaneously  Orientation Level: Unable to verbalize  Cognition: Follows commands  Safety/Judgement: Decreased insight into deficits  Functional Mobility Training:  Bed Mobility:  Rolling: Contact guard assistance  Supine to Sit: Minimum assistance  Sit to Supine: Minimum assistance           Transfers:  Sit to Stand: Moderate assistance;Assist x2  Stand to Sit: Moderate assistance;Assist x2        Bed to Chair: Maximum assistance;Assist x2                    Balance:  Sitting: Impaired  Sitting - Static: Fair (occasional)  Sitting - Dynamic: Fair (occasional); Poor (constant support)  Standing: Impaired  Standing - Static: Poor  Standing - Dynamic : Poor;Constant support  Ambulation/Gait Training:                                           Activity Tolerance:   Fair and requires rest breaks    After treatment patient left in no apparent distress:   Supine in bed, Call bell within reach, Bed / chair alarm activated, and Side rails x 3    COMMUNICATION/COLLABORATION:   The patients plan of care was discussed with: Registered nurse.      Delon Massey, PT   Time Calculation: 30 mins

## 2022-05-27 NOTE — PROGRESS NOTES
Problem: Falls - Risk of  Goal: *Absence of Falls  Description: Document [place]di Brito Fall Risk and appropriate interventions in the flowsheet. Outcome: Progressing Towards Goal  Note: Fall Risk Interventions:  Mobility Interventions: Assess mobility with egress test,Bed/chair exit alarm,Communicate number of staff needed for ambulation/transfer,OT consult for ADLs,Patient to call before getting OOB,PT Consult for mobility concerns,PT Consult for assist device competence,Strengthening exercises (ROM-active/passive),Utilize walker, cane, or other assistive device    Mentation Interventions: Adequate sleep, hydration, pain control,Bed/chair exit alarm,Door open when patient unattended,Evaluate medications/consider consulting pharmacy,Eyeglasses and hearing aids,Familiar objects from home,Family/sitter at [place] frequent rounding,Increase mobility,Room close to nurse's station,Toileting rounds,Update white board    Medication Interventions: Assess postural VS orthostatic hypotension,Bed/chair exit alarm,Evaluate medications/consider consulting pharmacy,Patient to call before getting OOB,Teach patient to arise slowly    Elimination Interventions: Bed/chair exit alarm,Call light in reach,Elevated toilet seat,Patient to call for help with toileting needs,Toilet paper/wipes in reach,Toileting schedule/hourly rounds    History of Falls Interventions: Bed/chair exit alarm,Consult care management for discharge planning,Door open when patient unattended,Evaluate medications/consider consulting pharmacy,Investigate reason for fall,Room close to nurse's station         Problem: Patient Education: Go to Patient Education Activity  Goal: Patient/Family Education  Outcome: Progressing Towards Goal     Problem: Pressure Injury - Risk of  Goal: *Prevention of pressure injury  Description: Document Eugene Scale and appropriate interventions in the flowsheet.   Outcome: Progressing Towards Goal  Note: Pressure Injury Interventions:  Sensory Interventions: Assess changes in LOC,Assess need for specialty bed,Chair cushion,Check visual cues for pain,Discuss PT/OT consult with provider,Float heels,Keep linens dry and wrinkle-free,Maintain/enhance activity level,Minimize linen layers,Monitor skin under medical devices,Pad between skin to skin,Pressure redistribution bed/mattress (bed type)    Moisture Interventions: Absorbent underpads,Apply protective barrier, creams and emollients,Assess need for specialty bed,Check for incontinence Q2 hours and as needed,Limit adult briefs,Maintain skin hydration (lotion/cream),Minimize layers,Moisture barrier    Activity Interventions: Assess need for specialty bed,Chair cushion,Increase time out of bed,Pressure redistribution bed/mattress(bed type),PT/OT evaluation    Mobility Interventions: Assess need for specialty bed,Chair cushion,Float heels,HOB 30 degrees or less,Pressure redistribution bed/mattress (bed type),PT/OT evaluation    Nutrition Interventions: Document food/fluid/supplement intake,Discuss nutritional consult with provider,Offer support with meals,snacks and hydration    Friction and Shear Interventions: Apply protective barrier, creams and emollients,Feet elevated on foot rest,HOB 30 degrees or less,Minimize layers                Problem: Patient Education: Go to Patient Education Activity  Goal: Patient/Family Education  Outcome: Progressing Towards Goal     Problem: Patient Education: Go to Patient Education Activity  Goal: Patient/Family Education  Outcome: Progressing Towards Goal     Problem: Patient Education: Go to Patient Education Activity  Goal: Patient/Family Education  Outcome: Progressing Towards Goal

## 2022-05-28 PROCEDURE — 74011000250 HC RX REV CODE- 250: Performed by: STUDENT IN AN ORGANIZED HEALTH CARE EDUCATION/TRAINING PROGRAM

## 2022-05-28 PROCEDURE — 65270000032 HC RM SEMIPRIVATE

## 2022-05-28 PROCEDURE — 74011250637 HC RX REV CODE- 250/637: Performed by: STUDENT IN AN ORGANIZED HEALTH CARE EDUCATION/TRAINING PROGRAM

## 2022-05-28 RX ADMIN — ACETAMINOPHEN 650 MG: 325 TABLET ORAL at 22:53

## 2022-05-28 NOTE — PROGRESS NOTES
Bedside shift change report given to Lucia Balbuena (oncoming nurse) by Margarito Preston (offgoing nurse). Report included the following information SBAR, Kardex, Intake/Output, MAR and Recent Results.

## 2022-05-28 NOTE — PROGRESS NOTES
Hospitalist Progress Note    NAME: Genia Moore   :  1955   MRN:  [id number]   Room Number:  319/51  @ [facility]       Interim Hospital Summary: 78 y.o. 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  Intraparenchymal hemorrhage with edema and mass-effect POA  Mass-effect with 4 mm shift to admit for midline POA  Hypertension  Comfort measures POA  -Assessed by neurosurgery at outside hospital, no intervention recommended  -Assessed by neurology at outside hospital  -Assessed by palliative care medicine and patient made comfortable per family request in accordance patient wishes           -Haldol as needed for agitation  -Comfort measures only  -Morphine as needed for pain  -Continue metoprolol  -Lidocaine patch  -Hospice consulted  - Meclizine PRN for dizziness           There is no height or weight on file to calculate BMI. Code Status: DNR     Surrogate Decision Maker:     DVT Prophylaxis: Comfort measures   GI Prophylaxis: not indicated       Subjective:     Chief Complaint / Reason for Physician Visit  No acute issues. Discussed with RN events overnight. Review of Systems:  No fevers, chills, appetite change, cough, sputum production, shortness of breath, dyspnea on exertion, nausea, vomitting, diarrhea, constipation, chest pain, leg edema, abdominal pain, joint pain, rash, itching. Objective:     VITALS:   Last 24hrs VS reviewed since prior progress note. Most recent are:  Patient Vitals for the past 24 hrs:   Temp Pulse Resp BP SpO2   22 0945 98.6 °F (37 °C) 66 17 118/64 97 %   22 2100 98.1 °F (36.7 °C) 62 18 (!) 118/44 96 %       Intake/Output Summary (Last 24 hours) at 2022 1019  Last data filed at 2022 2214  Gross per 24 hour   Intake 120 ml   Output --   Net 120 ml        PHYSICAL EXAM:  General: WD, WN.  Alert, cooperative, no acute distress EENT:  EOMI. Anicteric sclerae. MMM  Resp:  CTA bilaterally, no wheezing or rales. No accessory muscle use  CV:  Regular  rhythm,  normal S1/S2, no murmurs rubs gallops, No edema  GI:  Soft, Non distended, Non tender. +Bowel sounds  Neurologic:  Alert and oriented X self  Psych:    Not anxious nor agitated  Skin:  No rashes. No jaundice    Reviewed most current lab test results and cultures  YES  Reviewed most current radiology test results   YES  Review and summation of old records today    NO  Reviewed patient's current orders and MAR    YES  PMH/SH reviewed - no change compared to H&P  ________________________________________________________________________  Care Plan discussed with:    Comments   Patient     Family      RN     Care Manager     Consultant                        Multidiciplinary team rounds were held today with , nursing, pharmacist and clinical coordinator. Patient's plan of care was discussed; medications were reviewed and discharge planning was addressed. ________________________________________________________________________  Total NON critical care TIME: 25   Minutes    Total CRITICAL CARE TIME Spent:   Minutes non procedure based      Comments   >50% of visit spent in counseling and coordination of care     ________________________________________________________________________  Ayanna Helton MD     Procedures: see electronic medical records for all procedures/Xrays and details which were not copied into this note but were reviewed prior to creation of Plan. LABS:  I reviewed today's most current labs and imaging studies. Pertinent labs include:  No results for input(s): WBC, HGB, HCT, PLT, HGBEXT, HCTEXT, PLTEXT, HGBEXT, HCTEXT, PLTEXT in the last 72 hours. No results for input(s): NA, K, CL, CO2, GLU, BUN, CREA, CA, MG, PHOS, ALB, TBIL, TBILI, ALT, INR, INREXT, INREXT in the last 72 hours.     No lab exists for component: SGOT    Signed: Ayanna Helton, MD

## 2022-05-28 NOTE — PROGRESS NOTES
Problem: Falls - Risk of  Goal: *Absence of Falls  Description: Document [place] Fall Risk and appropriate interventions in the flowsheet. Outcome: Progressing Towards Goal  Note: Fall Risk Interventions:  Mobility Interventions: Bed/chair exit alarm,Communicate number of staff needed for ambulation/transfer,PT Consult for mobility concerns,Strengthening exercises (ROM-active/passive)    Mentation Interventions: Adequate sleep, hydration, pain control,Bed/chair exit alarm,Reorient patient    Medication Interventions: Bed/chair exit alarm    Elimination Interventions: Bed/chair exit alarm,Call light in reach    History of Falls Interventions: Bed/chair exit alarm         Problem: Pressure Injury - Risk of  Goal: *Prevention of pressure injury  Description: Document Eugene Scale and appropriate interventions in the flowsheet.   Outcome: Progressing Towards Goal  Note: Pressure Injury Interventions:  Sensory Interventions: Assess changes in LOC,Float heels,Keep linens dry and wrinkle-free    Moisture Interventions: Absorbent underpads,Apply protective barrier, creams and emollients,Moisture barrier    Activity Interventions: Assess need for specialty bed,PT/OT evaluation    Mobility Interventions: Assess need for specialty bed,PT/OT evaluation    Nutrition Interventions: Document food/fluid/supplement intake,Offer support with meals,snacks and hydration    Friction and Shear Interventions: Apply protective barrier, creams and emollients,Minimize layers

## 2022-05-29 PROCEDURE — 74011000250 HC RX REV CODE- 250: Performed by: STUDENT IN AN ORGANIZED HEALTH CARE EDUCATION/TRAINING PROGRAM

## 2022-05-29 PROCEDURE — 74011250637 HC RX REV CODE- 250/637: Performed by: STUDENT IN AN ORGANIZED HEALTH CARE EDUCATION/TRAINING PROGRAM

## 2022-05-29 PROCEDURE — 65270000032 HC RM SEMIPRIVATE

## 2022-05-29 RX ADMIN — MORPHINE SULFATE 15 MG: 15 TABLET ORAL at 10:54

## 2022-05-29 RX ADMIN — ACETAMINOPHEN 650 MG: 325 TABLET ORAL at 09:06

## 2022-05-29 NOTE — PROGRESS NOTES
8999) Bedside shift change report given to Carina Ramos RN (oncoming nurse) by Luba Lazcano RN (offgoing nurse). Report included the following information SBAR, Kardex, Intake/Output, MAR and Recent Results. 0840) Pt assessed and vital signs stable. Pt has no c/o pain at this time. Pt repositioned in bed. Metroprolol held r/t soft BP. Tylenol given PRN for pain. Pt left sitting up in bed eating breakfast at this time. Pt stated no additional needs. 56) Interdisciplinary team rounds were held 5/29/2022 with the following team members:Nursing and Physician.     Plan of care discussed. See clinical pathway and/or care plan for interventions and desired outcomes    1055) PRN morphine given for pain 8/10. Bairon March assisting pt with repositioning, linen change and CHG bath    1145) Pain reassessed and pt stated still there rating 7/10 at this time. Water pitcher refilled and pt left resting in bed at this time. 1215) Pt repositioned in bed for lunch. Pt sitting up in bed and stated no additional needs. 1330) Pt asleep in bed at this time. Rise and fall of chest noted. 1410) Pt asleep in bed at this time. Rise and fall of chest noted. 1515) Pt reassessed and no changes to note. Vital signs not obtained r/t pt's extended stay status. Pt tearful at this time Pt repositioned in bed and stated no additional needs at this time. 1710) Pt repositioned in bed. Bed pan given. Pt stated no additional needs at this time. 1805) Pt repositioned in bed. Water pitcher refilled. Pt stated no additional needs at this time and left resting in bed. 1910) Bedside shift change report given to Luba Lazcano RN (oncoming nurse) by Carina Ramos RN (offgoing nurse). Report included the following information SBAR, Kardex, Intake/Output and MAR.

## 2022-05-29 NOTE — PROGRESS NOTES
Hospitalist Progress Note    NAME: Jennifer Siegel   :  1955   MRN:  [id number]   Room Number:  079/54  @ [facility]       Interim Hospital Summary: 78 y.o. 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  Intraparenchymal hemorrhage with edema and mass-effect POA  Mass-effect with 4 mm shift to admit for midline POA  Hypertension  Comfort measures POA  -Assessed by neurosurgery at outside hospital, no intervention recommended  -Assessed by neurology at outside hospital  -Assessed by palliative care medicine and patient made comfortable per family request in accordance patient wishes           -Haldol as needed for agitation  -Comfort measures only  -Morphine as needed for pain  -Continue metoprolol  -Lidocaine patch  -Hospice consulted  - Meclizine PRN for dizziness           There is no height or weight on file to calculate BMI. Code Status: DNR     Surrogate Decision Maker:     DVT Prophylaxis: Comfort measures   GI Prophylaxis: not indicated       Subjective:     Chief Complaint / Reason for Physician Visit  No acute issues. Discussed with RN events overnight. Review of Systems:  No fevers, chills, appetite change, cough, sputum production, shortness of breath, dyspnea on exertion, nausea, vomitting, diarrhea, constipation, chest pain, leg edema, abdominal pain, joint pain, rash, itching. Objective:     VITALS:   Last 24hrs VS reviewed since prior progress note. Most recent are:  Patient Vitals for the past 24 hrs:   Temp Pulse Resp BP SpO2   22 0840 98.4 °F (36.9 °C) 81 18 (!) 94/50 96 %   22 98.4 °F (36.9 °C) 77 16 (!) 107/47 96 %       Intake/Output Summary (Last 24 hours) at 2022 1010  Last data filed at 2022 2257  Gross per 24 hour   Intake 120 ml   Output --   Net 120 ml        PHYSICAL EXAM:  General: WD, WN.  Alert, cooperative, no acute distress EENT:  EOMI. Anicteric sclerae. MMM  Resp:  CTA bilaterally, no wheezing or rales. No accessory muscle use  CV:  Regular  rhythm,  normal S1/S2, no murmurs rubs gallops, No edema  GI:  Soft, Non distended, Non tender. +Bowel sounds  Neurologic:  Alert and oriented X self  Psych:    Not anxious nor agitated  Skin:  No rashes. No jaundice    Reviewed most current lab test results and cultures  YES  Reviewed most current radiology test results   YES  Review and summation of old records today    NO  Reviewed patient's current orders and MAR    YES  PMH/SH reviewed - no change compared to H&P  ________________________________________________________________________  Care Plan discussed with:    Comments   Patient     Family      RN     Care Manager     Consultant                        Multidiciplinary team rounds were held today with , nursing, pharmacist and clinical coordinator. Patient's plan of care was discussed; medications were reviewed and discharge planning was addressed. ________________________________________________________________________  Total NON critical care TIME: 25   Minutes    Total CRITICAL CARE TIME Spent:   Minutes non procedure based      Comments   >50% of visit spent in counseling and coordination of care     ________________________________________________________________________  Ayanna Helton MD     Procedures: see electronic medical records for all procedures/Xrays and details which were not copied into this note but were reviewed prior to creation of Plan. LABS:  I reviewed today's most current labs and imaging studies. Pertinent labs include:  No results for input(s): WBC, HGB, HCT, PLT, HGBEXT, HCTEXT, PLTEXT, HGBEXT, HCTEXT, PLTEXT in the last 72 hours. No results for input(s): NA, K, CL, CO2, GLU, BUN, CREA, CA, MG, PHOS, ALB, TBIL, TBILI, ALT, INR, INREXT, INREXT in the last 72 hours.     No lab exists for component: SGOT    Signed: Ayanna Helton, MD

## 2022-05-29 NOTE — PROGRESS NOTES
Problem: Falls - Risk of  Goal: *Absence of Falls  Description: Document Nicky Trent Fall Risk and appropriate interventions in the flowsheet. Outcome: Progressing Towards Goal  Note: Fall Risk Interventions:  Mobility Interventions: Bed/chair exit alarm,Mechanical lift,PT Consult for mobility concerns,PT Consult for assist device competence    Mentation Interventions: Adequate sleep, hydration, pain control,Bed/chair exit alarm,Door open when patient unattended,Room close to nurse's station,Update white board    Medication Interventions: Teach patient to arise slowly    Elimination Interventions: Call light in reach,Toileting schedule/hourly rounds    History of Falls Interventions: Bed/chair exit alarm,Door open when patient unattended,Room close to nurse's station         Problem: Pressure Injury - Risk of  Goal: *Prevention of pressure injury  Description: Document Eugene Scale and appropriate interventions in the flowsheet.   Outcome: Progressing Towards Goal  Note: Pressure Injury Interventions:  Sensory Interventions: Assess changes in LOC,Assess need for specialty bed,Check visual cues for pain,Keep linens dry and wrinkle-free    Moisture Interventions: Absorbent underpads,Check for incontinence Q2 hours and as needed,Internal/External urinary devices    Activity Interventions: Increase time out of bed,PT/OT evaluation    Mobility Interventions: Assess need for specialty bed,Pressure redistribution bed/mattress (bed type),PT/OT evaluation    Nutrition Interventions: Offer support with meals,snacks and hydration    Friction and Shear Interventions: Apply protective barrier, creams and emollients,Feet elevated on foot rest,HOB 30 degrees or less,Transferring/repositioning devices

## 2022-05-29 NOTE — PROGRESS NOTES
Bedside shift change report given to Brant Matthews (oncoming nurse) by Margarito Preston (offgoing nurse). Report included the following information SBAR, Kardex, Intake/Output, MAR and Recent Results.

## 2022-05-30 PROCEDURE — 74011250636 HC RX REV CODE- 250/636: Performed by: STUDENT IN AN ORGANIZED HEALTH CARE EDUCATION/TRAINING PROGRAM

## 2022-05-30 PROCEDURE — 65270000032 HC RM SEMIPRIVATE

## 2022-05-30 PROCEDURE — 74011250637 HC RX REV CODE- 250/637: Performed by: STUDENT IN AN ORGANIZED HEALTH CARE EDUCATION/TRAINING PROGRAM

## 2022-05-30 RX ADMIN — ONDANSETRON 4 MG: 4 TABLET, ORALLY DISINTEGRATING ORAL at 13:34

## 2022-05-30 RX ADMIN — MECLIZINE 25 MG: 12.5 TABLET ORAL at 13:33

## 2022-05-30 RX ADMIN — MORPHINE SULFATE 15 MG: 15 TABLET ORAL at 08:58

## 2022-05-30 RX ADMIN — METOPROLOL SUCCINATE 25 MG: 25 TABLET, EXTENDED RELEASE ORAL at 08:58

## 2022-05-30 RX ADMIN — MORPHINE SULFATE 15 MG: 15 TABLET ORAL at 19:02

## 2022-05-30 NOTE — PROGRESS NOTES
3695) Bedside shift change report given to Nico Cavanaugh RN (oncoming nurse) by Min Salas RN (offgoing nurse). Report included the following information SBAR, Kardex, Intake/Output, MAR and Recent Results. Pt asleep in bed at this time. Rise and fall of chest noted. Side rails x3. Bed alarm on.     0855) Pt assessed and vital signs stable. Pt c/o pain 7/10, PRN morphine given along with scheduled meds. Pt repositioned in bed and set up with breakfast tray. Pt stated no additional needs at this time and left resting in bed.     0925) Pt assisted with bed pan. Pt repositioned in bed and stated no additional needs. Bed alarm on.     1015) Pt visual pain score of 0/10. Pt asleep in bed at this time. Lidocaine patch removed. Pt left resting at this time and stated no additional needs. 56) Interdisciplinary team rounds were held 5/30/2022 with the following team members:Care Management, Nursing and Physician. Plan of care discussed. See clinical pathway and/or care plan for interventions and desired outcomes. 1155) Pt bathed with CHG wipes, linens changed and new gown provided. Pt stated no additional needs and left resting in bed at this time. 1235) Pt repositioned in bed for lunch. Pt resting in bed at this time and stated no needs. 1335) Pt screaming out and supervisor assessing. Pt c/o nausea and dizziness. PRN zofran and antivert given. Pt resting in bed at this time. Ice packs and emesis bag at the bedside. Bed pan used. 1445) Pt resting in bed at this time. Staff present at the bedside assisting with hair care. 1520) Pt reassessed and no changes to note. Vital signs not obtained r/t pt's extended stay status. Pt had no c/o pain at this time. Assisted with repositioning. Water pitcher refilled. Pt left resting in bed at this time. 1600) Pt resting in bed at this time. Pt c/o dizziness but denied fan, medication and repositioning. Pt left resting in bed at this time.      1730) Pt repositioned in bed for dinner. 1800) Pt placed on bed pan and voided. Attempted to call pt family. Family did not answer. Per CM family to come visit pt today at 1700. Pt tearful at this time but refused talking when  used. Pt consoled and left resting in bed at this time. 1900) 15 mg morphine given for pain 7/10. Pt voided in bed pan and left resting in bed at this time. 1910) Bedside shift change report given to Marjorie Chisholm RN (oncoming nurse) by Jairo Dumont RN (offgoing nurse). Report included the following information SBAR, Kardex, Intake/Output, MAR and Recent Results.

## 2022-05-30 NOTE — PROGRESS NOTES
Problem: Falls - Risk of  Goal: *Absence of Falls  Description: Document Estelline [place]town Fall Risk and appropriate interventions in the flowsheet. Outcome: Progressing Towards Goal  Note: Fall Risk Interventions:  Mobility Interventions: Bed/chair exit alarm,Patient to call before getting OOB,PT Consult for mobility concerns,OT consult for ADLs    Mentation Interventions: Adequate sleep, hydration, pain control,Bed/chair exit alarm,Reorient patient,Room close to nurse's station,Update white board    Medication Interventions: Teach patient to arise slowly    Elimination Interventions: Call light in reach,Toilet paper/wipes in reach,Toileting schedule/hourly rounds    History of Falls Interventions: Bed/chair exit alarm,Door open when patient unattended,Room close to nurse's station         Problem: Pressure Injury - Risk of  Goal: *Prevention of pressure injury  Description: Document Eugene Scale and appropriate interventions in the flowsheet.   Outcome: Progressing Towards Goal  Note: Pressure Injury Interventions:  Sensory Interventions: Assess changes in LOC,Float heels,Maintain/enhance activity level,Minimize linen layers    Moisture Interventions: Assess need for specialty bed,Apply protective barrier, creams and emollients,Minimize layers    Activity Interventions: Increase time out of bed,PT/OT evaluation    Mobility Interventions: HOB 30 degrees or less,PT/OT evaluation    Nutrition Interventions: Offer support with meals,snacks and hydration    Friction and Shear Interventions: Apply protective barrier, creams and emollients,Foam dressings/transparent film/skin sealants

## 2022-05-30 NOTE — PROGRESS NOTES
Patient started yelling out in [address] [facility]. I went to her room and out on the  machine. The  was Alexander. I asked the patient what is wrong and she said she feels dizzy, very hot and like she is going to throw up. She asked me to lift the head of her bed. I notified her nurse who got her something for the nausea, I put her hair up and put cold packs on her neck, arms and legs. She said she is feeling better.

## 2022-05-30 NOTE — PROGRESS NOTES
AVERY    RUR 10 %     IDR Rounds this am with MD and nursing. Continue plan of care   Will need [address] written to continue to follow. Per nursing patients wants to leave - some frustrations about what will happen. Needs clarifying information from family. They have come in to visit at times late in the evenings/     MARJORIE undetermined. Called the # listed for daughter - talked to a Gentlemen - he said they will be here at  5 PM today. Gave my # and asked them to have me called when they arrived.      [facility] EARNEST RN   [phone]

## 2022-05-30 NOTE — PROGRESS NOTES
Hospitalist Progress Note    NAME: Crystal Roy   :  1955   MRN:  [id number]   Room Number:  372/05  @ [facility]       Interim Hospital Summary: 78 y.o. 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  Intraparenchymal hemorrhage with edema and mass-effect POA  Mass-effect with 4 mm shift to admit for midline POA  Hypertension  Comfort measures POA  -Assessed by neurosurgery at outside hospital, no intervention recommended  -Assessed by neurology at outside hospital  -Assessed by palliative care medicine and patient made comfortable per family request in accordance patient wishes           -Haldol as needed for agitation  -Comfort measures only  -Morphine as needed for pain  -Continue metoprolol  -Lidocaine patch  -Hospice consulted  - Meclizine PRN for dizziness           There is no height or weight on file to calculate BMI. Code Status: DNR     Surrogate Decision Maker:     DVT Prophylaxis: Comfort measures   GI Prophylaxis: not indicated       Subjective:     Chief Complaint / Reason for Physician Visit  No acute issues. Discussed with RN events overnight. Review of Systems:  No fevers, chills, appetite change, cough, sputum production, shortness of breath, dyspnea on exertion, nausea, vomitting, diarrhea, constipation, chest pain, leg edema, abdominal pain, joint pain, rash, itching. Objective:     VITALS:   Last 24hrs VS reviewed since prior progress note. Most recent are:  Patient Vitals for the past 24 hrs:   Temp Pulse Resp BP SpO2   22 98.6 °F (37 °C) 66 18 (!) 121/54 100 %   22 0840 98.4 °F (36.9 °C) 81 18 (!) 94/50 96 %     No intake or output data in the 24 hours ending 22 0836     PHYSICAL EXAM:  General: WD, WN. Alert, cooperative, no acute distress    EENT:  EOMI. Anicteric sclerae. MMM  Resp:  CTA bilaterally, no wheezing or rales.   No accessory muscle use  CV:  Regular  rhythm,  normal S1/S2, no murmurs rubs gallops, No edema  GI:  Soft, Non distended, Non tender. +Bowel sounds  Neurologic:  Alert and oriented X self  Psych:    Not anxious nor agitated  Skin:  No rashes. No jaundice    Reviewed most current lab test results and cultures  YES  Reviewed most current radiology test results   YES  Review and summation of old records today    NO  Reviewed patient's current orders and MAR    YES  PMH/SH reviewed - no change compared to H&P  ________________________________________________________________________  Care Plan discussed with:    Comments   Patient     Family      RN     Care Manager     Consultant                        Multidiciplinary team rounds were held today with , nursing, pharmacist and clinical coordinator. Patient's plan of care was discussed; medications were reviewed and discharge planning was addressed. ________________________________________________________________________  Total NON critical care TIME: 25   Minutes    Total CRITICAL CARE TIME Spent:   Minutes non procedure based      Comments   >50% of visit spent in counseling and coordination of care     ________________________________________________________________________  Disha Balderas MD     Procedures: see electronic medical records for all procedures/Xrays and details which were not copied into this note but were reviewed prior to creation of Plan. LABS:  I reviewed today's most current labs and imaging studies. Pertinent labs include:  No results for input(s): WBC, HGB, HCT, PLT, HGBEXT, HCTEXT, PLTEXT, HGBEXT, HCTEXT, PLTEXT in the last 72 hours. No results for input(s): NA, K, CL, CO2, GLU, BUN, CREA, CA, MG, PHOS, ALB, TBIL, TBILI, ALT, INR, INREXT, INREXT in the last 72 hours.     No lab exists for component: SGOT    Signed: Disha Balderas MD

## 2022-05-30 NOTE — PROGRESS NOTES
Bedside shift change report given to Kristofer Evans (oncoming nurse) by Yulisa Jackson (offgoing nurse). Report included the following information SBAR, Kardex, Intake/Output, MAR and Recent Results.

## 2022-05-30 NOTE — PROGRESS NOTES
Spiritual Care Partner Volunteer visited patient at [facility] in [address] on 5/30/2022   Documented by:     ALEXSANDRA Weir, [facility], Staff [address]    [address] Paging Service  [phone])

## 2022-05-31 PROCEDURE — 97530 THERAPEUTIC ACTIVITIES: CPT | Performed by: OCCUPATIONAL THERAPIST

## 2022-05-31 PROCEDURE — 97110 THERAPEUTIC EXERCISES: CPT

## 2022-05-31 PROCEDURE — 65270000032 HC RM SEMIPRIVATE

## 2022-05-31 PROCEDURE — 97530 THERAPEUTIC ACTIVITIES: CPT

## 2022-05-31 PROCEDURE — 74011250637 HC RX REV CODE- 250/637: Performed by: STUDENT IN AN ORGANIZED HEALTH CARE EDUCATION/TRAINING PROGRAM

## 2022-05-31 PROCEDURE — 97112 NEUROMUSCULAR REEDUCATION: CPT | Performed by: OCCUPATIONAL THERAPIST

## 2022-05-31 PROCEDURE — 74011250636 HC RX REV CODE- 250/636: Performed by: STUDENT IN AN ORGANIZED HEALTH CARE EDUCATION/TRAINING PROGRAM

## 2022-05-31 RX ADMIN — METOPROLOL SUCCINATE 25 MG: 25 TABLET, EXTENDED RELEASE ORAL at 08:52

## 2022-05-31 RX ADMIN — MORPHINE SULFATE 30 MG: 15 TABLET ORAL at 16:15

## 2022-05-31 RX ADMIN — MECLIZINE 25 MG: 12.5 TABLET ORAL at 09:37

## 2022-05-31 NOTE — PROGRESS NOTES
0710) Bedside shift change report given to Jacob Fofana RN (oncoming nurse) by [facility], RN (offgoing nurse). Report included the following information SBAR, Kardex, Intake/Output, MAR and Recent Results. Pt asleep in bed at this time. Rise and fall of chest noted. Bed alarm on.     0845) Pt assessed and vital signs stable. Pt has no c/o pain at this time. Scheduled meds given. Pt repositioned in bed at this time. Breakfast tray provided. Pt questioning discharge and stated she wants to be home with family. Pt educated on family communication issues and instructed to communicate with daughter that CM needs to speak with family. 0935) Pt yelling out in room at this time. This writer went to assess.  used. Pt c/o dizziness and being hot. Ice packs provided and antivert given PRN. Pt repositioned in bed and left eating at this time. 1015) Pt asleep in bed at this time. 1025) Interdisciplinary team rounds were held 5/31/2022 with the following team members:Care Management, Nursing, Pharmacy and Physician. Plan of care discussed. See clinical pathway and/or care plan for interventions and desired outcomes. 1145) Pt sitting up in chair and screaming at this writer in [address] [facility].  utilized. Pt provided hearing aid and threw it on the bed whil calling . This writer spoke louder for pt to hear as she did not want to wear hearing aid. Pt stated \"Dont yell at me, youre not my daughter. This writer apologized and stated it was just so she could hear. Pt kept stating do not yell x5. Pt asked again what she needs or what is hurting. Pt stated she needed to get out of chair that her legs were killing her. PT notified and transferring pt back to bed.     1235) Pt back in bed at this time eating lunch. Water pitcher refilled per request. Pt stated no additional needs at this time. 1325) Pt resting in bed at this time. 1420) Bed pan used. New linens provided.  Pt repositioned in bed and stated no additional needs at this time. 1555) Pt reassessed and screaming at this time. Pt c/o pain to left arm and leg, throwing sheets, pillows and trash around in bed. Pt stated she would like the morphine. 340 mg morphine given by Nighat Mcpherson RN. Nighat Mcpherson stated pt pretending to take pills and holding them in her hand. Pt took pills infront of Mena. Bed abdi used. Pt left resting in bed. 1700) Pt resting comfortably in bed at this time. 1810) Pt assisted on bed pan. Pt repositioned in bed. Pt stated no additional needs at this time and left resting in bed. 1910) Bedside shift change report given to Joselito Donahue RN (oncoming nurse) by Neva Parrish RN (offgoing nurse). Report included the following information SBAR, Kardex, Intake/Output and MAR.

## 2022-05-31 NOTE — PROGRESS NOTES
Hospitalist Progress Note    NAME: Eloise Bueno   :  1955   MRN:  [id number]   Room Number:  349/50  @ [facility] Summary: 78 y.o. 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  Intraparenchymal hemorrhage with edema and mass-effect POA  Mass-effect with 4 mm shift to admit for midline POA  Hypertension  Comfort measures POA  -Assessed by neurosurgery at outside hospital, no intervention recommended  -Assessed by neurology at outside hospital  -Assessed by palliative care medicine and patient made comfortable per family request in accordance patient wishes           -Haldol as needed for agitation  -Comfort measures only  -Morphine as needed for pain  -Continue metoprolol  -Lidocaine patch  -Hospice consulted  - Meclizine PRN for dizziness           There is no height or weight on file to calculate BMI. Code Status: DNR     Surrogate Decision Maker:     DVT Prophylaxis: Comfort measures   GI Prophylaxis: not indicated       Subjective:     Chief Complaint / Reason for Physician Visit  No acute issues. Discussed with RN events overnight. Review of Systems:  No fevers, chills, appetite change, cough, sputum production, shortness of breath, dyspnea on exertion, nausea, vomitting, diarrhea, constipation, chest pain, leg edema, abdominal pain, joint pain, rash, itching. Objective:     VITALS:   Last 24hrs VS reviewed since prior progress note. Most recent are:  Patient Vitals for the past 24 hrs:   Temp Pulse Resp BP SpO2   22 0842 97.6 °F (36.4 °C) 65 18 (!) 123/56 97 %   22 1950 97.9 °F (36.6 °C) 74 16 124/69 99 %     No intake or output data in the 24 hours ending 22 0852     PHYSICAL EXAM:  General: WD, WN. Alert, cooperative, no acute distress    EENT:  EOMI. Anicteric sclerae. MMM  Resp:  CTA bilaterally, no wheezing or rales.   No accessory muscle use  CV:  Regular  rhythm,  normal S1/S2, no murmurs rubs gallops, No edema  GI:  Soft, Non distended, Non tender. +Bowel sounds  Neurologic:  Alert and oriented X self  Psych:    Not anxious nor agitated  Skin:  No rashes. No jaundice    Reviewed most current lab test results and cultures  YES  Reviewed most current radiology test results   YES  Review and summation of old records today    NO  Reviewed patient's current orders and MAR    YES  PMH/SH reviewed - no change compared to H&P  ________________________________________________________________________  Care Plan discussed with:    Comments   Patient     Family      RN     Care Manager     Consultant                        Multidiciplinary team rounds were held today with , nursing, pharmacist and clinical coordinator. Patient's plan of care was discussed; medications were reviewed and discharge planning was addressed. ________________________________________________________________________  Total NON critical care TIME: 25   Minutes    Total CRITICAL CARE TIME Spent:   Minutes non procedure based      Comments   >50% of visit spent in counseling and coordination of care     ________________________________________________________________________  Shaun Pruitt MD     Procedures: see electronic medical records for all procedures/Xrays and details which were not copied into this note but were reviewed prior to creation of Plan. LABS:  I reviewed today's most current labs and imaging studies. Pertinent labs include:  No results for input(s): WBC, HGB, HCT, PLT, HGBEXT, HCTEXT, PLTEXT, HGBEXT, HCTEXT, PLTEXT in the last 72 hours. No results for input(s): NA, K, CL, CO2, GLU, BUN, CREA, CA, MG, PHOS, ALB, TBIL, TBILI, ALT, INR, INREXT, INREXT in the last 72 hours.     No lab exists for component: SGOT    Signed: Shaun Pruitt MD

## 2022-05-31 NOTE — PROGRESS NOTES
Problem: Falls - Risk of  Goal: *Absence of Falls  Description: Document Rosetta Srinivasan Fall Risk and appropriate interventions in the flowsheet. Outcome: Progressing Towards Goal  Note: Fall Risk Interventions:  Mobility Interventions: Bed/chair exit alarm,Patient to call before getting OOB,Utilize walker, cane, or other assistive device    Mentation Interventions: Adequate sleep, hydration, pain control,Bed/chair exit alarm,Reorient patient,Room close to nurse's station,Update white board    Medication Interventions: Teach patient to arise slowly    Elimination Interventions: Call light in reach,Toilet paper/wipes in reach    History of Falls Interventions: Bed/chair exit alarm,Door open when patient unattended,Room close to nurse's station         Problem: Pressure Injury - Risk of  Goal: *Prevention of pressure injury  Description: Document Eugene Scale and appropriate interventions in the flowsheet.   Outcome: Progressing Towards Goal  Note: Pressure Injury Interventions:  Sensory Interventions: Assess changes in LOC,Check visual cues for pain,Keep linens dry and wrinkle-free,Maintain/enhance activity level    Moisture Interventions: Apply protective barrier, creams and emollients,Assess need for specialty bed,Maintain skin hydration (lotion/cream),Moisture barrier    Activity Interventions: PT/OT evaluation,Increase time out of bed    Mobility Interventions: PT/OT evaluation,Float heels,HOB 30 degrees or less    Nutrition Interventions: Offer support with meals,snacks and hydration    Friction and Shear Interventions: Apply protective barrier, creams and emollients,Foam dressings/transparent film/skin sealants,Transferring/repositioning devices

## 2022-05-31 NOTE — PROGRESS NOTES
Problem: Self Care Deficits Care Plan (Adult)  Goal: *Acute Goals and Plan of Care (Insert Text)  Description: FUNCTIONAL STATUS PRIOR TO ADMISSION: Patient was independent and active without use of DME.    HOME SUPPORT: The patient lived with family but did not require assist.    Occupational Therapy Goals  Initiated 5/23/2022, Weekly re-evaluation 5/31/2022-continue all goals  1. Patient will perform seated grooming with minimal assistance  within 7 day(s). 2.  Patient will perform upper body dressing with moderate assistance  within 7 day(s). 3.  Patient will perform lower body dressing with moderate assistance within 7 day(s). 4.  Patient will perform toilet transfers to [facility] with moderate assistance within 7 day(s). 5.  Patient will perform all aspects of toileting with moderate assistance  within 7 day(s). 6.  Patient will participate in upper extremity therapeutic exercise/activities with maximal assistance within 7 day(s). 7.  Patient will utilize energy conservation and fall prevention techniques during functional activities with verbal cues within 7 day(s). 8.  Patient will improve their Fugl Harris score by 5 points in prep for ADLs within 7 days. Outcome: Progressing Towards Goal    OCCUPATIONAL THERAPY TREATMENT/WEEKLY RE-EVALUATION  Patient: Jennifer Siegel (13 y.o. female)  Date: 5/31/2022  Diagnosis: Left-sided weakness [R53.1] <principal problem not specified>       Precautions: Fall  Chart, occupational therapy assessment, plan of care, and goals were reviewed. ASSESSMENT  Based on the objective data described below, patient continues to be limited by decreased use of left UE, decreased command following with video  and microphone for hearing. Unable to formally assess with Fugyl-Harris however with AROM elbow flexion/extension, attempt to prevent using right UE to move left. Unable to transfer to chair and wished to return to supine.      Current Level of Function Impacting Discharge (ADLs): min assist bed mobility and sit to stand, unable to facilitate pivot and leaning posteriorly on bed, max assist UE ADL's and LE     Other factors to consider for discharge: ? Comfort care         PLAN :  Goals have been updated based on progression since last assessment. Patient continues to benefit from skilled intervention to address the above impairments. Continue to follow patient 3 times a week to address goals. Recommend with staff: elevate left UE, sitting upright for eating, left hand supported    Recommend next OT session: UE dressing, grooming, weight bearing left UE    Recommendation for discharge: (in order for the patient to meet his/her long term goals)  Therapy 3 hours per day 5-7 days per week    This discharge recommendation:  Has been made in collaboration with the attending provider and/or case management    Equipment recommendations for successful discharge (if) home:        SUBJECTIVE:   Patient stated here.  stated in Setswana while rubbing her left shoulder when asked where her arm hurt     OBJECTIVE DATA SUMMARY:   Cognitive/Behavioral Status:  Neurologic State: Alert  Orientation Level: Oriented X4  Cognition: Follows commands  Perception: Cues to attend to left side of body     Safety/Judgement: Lack of insight into deficits    Functional Mobility and Transfers for ADLs:  Bed Mobility:  Rolling: Contact guard assistance  Supine to Sit: Contact guard assistance  Sit to Supine: Minimum assistance  Scooting: Minimum assistance    Transfers:  Sit to Stand: Minimum assistance;Assist x1     Bed to Chair: Maximum assistance    Balance:  Sitting: Impaired  Sitting - Static: Good (unsupported)  Sitting - Dynamic: Fair (occasional)  Standing: Impaired  Standing - Static: Poor;Constant support  Standing - Dynamic : Poor;Constant support    ADL Intervention:   Using video  and microphone for decreased hearing, still difficult to facilitate command following to formally test using Fugyl-Harris. Grooming  Grooming Assistance: Moderate assistance  Position Performed: Seated edge of bed  Washing Face: Contact guard assistance  Washing Hands: Maximum assistance       Upper Body Dressing Assistance  Dressing Assistance: Maximum assistance; Total assistance(dependent)  Hospital Gown: Maximum assistance; Total assistance (dependent)    Lower Body Dressing Assistance  Dressing Assistance: Maximum assistance  Socks: Maximum assistance    Toileting  Toileting Assistance:  Total assistance(dependent)    Cognitive Retraining  Safety/Judgement: Lack of insight into deficits    Instructed on use of call bell and phone, increased time to carryover use of call bell, demonstrated ability to press for RN    Pain:  Left shoulder and forearm when asked via video     Activity Tolerance:   Fair    After treatment patient left in no apparent distress:   Supine in bed, Heels elevated for pressure relief, Call bell within reach, and Side rails x 3    COMMUNICATION/COLLABORATION:   The patients plan of care was discussed with: Physical Therapist and Registered Nurse    Shirley Jacobs OTR/L  Time Calculation: 30 mins

## 2022-05-31 NOTE — PROGRESS NOTES
Problem: Mobility Impaired (Adult and Pediatric)  Goal: *Acute Goals and Plan of Care (Insert Text)  Description:   FUNCTIONAL STATUS PRIOR TO ADMISSION: Patient was independent and active without use of DME.    HOME SUPPORT PRIOR TO ADMISSION: The patient lived with family but did not require assist.    Physical Therapy Goals  Initiated 5/20/2022  1. Patient will move from supine to sit and sit to supine  in bed with minimal assistance/contact guard assist within 7 day(s). 2.  Patient will transfer from bed to chair and chair to bed with moderate assistance  using the least restrictive device within 7 day(s). 3.  Patient will perform sit to stand with minimal assistance/contact guard assist within 7 day(s). 4.  Patient will ambulate with moderate assistance  for 20 feet with the least restrictive device within 7 day(s). 6.  Patient will improve Peralta Balance score by 7 points within 7 days. Outcome: Progressing Towards Goal   PHYSICAL THERAPY TREATMENT  Patient: Aurea Moreira (45 y.o. female)  Date: 5/31/2022  Diagnosis: Left-sided weakness [R53.1] <principal problem not specified>       Precautions: Fall  Chart, physical therapy assessment, plan of care and goals were reviewed. ASSESSMENT  Patient continues with skilled PT services and is progressing towards goals. Pt remains limited by L sided weakness and difficulties with communication. Attempted use of Stratus  though pt would not verbally respond with . Performed repeated sit<>stands from EOB with as little at [address] A to complete transfer. Blocking of R knee to improve standing balance and performance. Utilized R hand on foot of bed as no hemiwalker could be located. Pt with heavy use of back of legs against the bed for added support. Continues to require Max A for transfer to the chair d/t unsafe transfer technique and poor weight shifting for step clearance.  Remained up in the chair for 30 minutes before ringing out to request back to bed d/t LLE pain. Assisted back to bed for nursing. Continue to recommend SNF placement. Current Level of Function Impacting Discharge (mobility/balance): Max A for transfer, improved ability to stand    Other factors to consider for discharge: communication difficulties         PLAN :  Patient continues to benefit from skilled intervention to address the above impairments. Continue treatment per established plan of care. to address goals. Recommendation for discharge: (in order for the patient to meet his/her long term goals)  Therapy up to 5 days/week in SNF setting    This discharge recommendation:  Has been made in collaboration with the attending provider and/or case management    IF patient discharges home will need the following DME: to be determined (TBD)       SUBJECTIVE:   Patient stated My leg hurts.     OBJECTIVE DATA SUMMARY:   Critical Behavior:  Neurologic State: Alert  Orientation Level: Oriented X4  Cognition: Follows commands  Safety/Judgement: Decreased insight into deficits,Decreased awareness of need for safety,Decreased awareness of need for assistance,Decreased awareness of environment  Functional Mobility Training:  Bed Mobility:  Rolling: Contact guard assistance  Supine to Sit: Contact guard assistance  Sit to Supine: Minimum assistance  Scooting: Minimum assistance        Transfers:  Sit to Stand: Minimum assistance;Assist x1  Stand to Sit: Minimum assistance;Assist x1        Bed to Chair: Maximum assistance                    Balance:  Sitting: Impaired  Sitting - Static: Good (unsupported)  Sitting - Dynamic: Fair (occasional)  Standing: Impaired  Standing - Static: Poor;Constant support  Standing - Dynamic : Poor;Constant support  Ambulation/Gait Training:                           Activity Tolerance:   Fair    After treatment patient left in no apparent distress:   Sitting in chair, Call bell within reach, and Bed / chair alarm activated    COMMUNICATION/COLLABORATION:   The patients plan of care was discussed with: Registered nurse.      Jocelyne Cherry, PT   Time Calculation: 30 mins

## 2022-06-01 PROCEDURE — 74011250636 HC RX REV CODE- 250/636: Performed by: STUDENT IN AN ORGANIZED HEALTH CARE EDUCATION/TRAINING PROGRAM

## 2022-06-01 PROCEDURE — 65270000032 HC RM SEMIPRIVATE

## 2022-06-01 PROCEDURE — 74011250637 HC RX REV CODE- 250/637: Performed by: STUDENT IN AN ORGANIZED HEALTH CARE EDUCATION/TRAINING PROGRAM

## 2022-06-01 PROCEDURE — 97530 THERAPEUTIC ACTIVITIES: CPT

## 2022-06-01 PROCEDURE — 97110 THERAPEUTIC EXERCISES: CPT

## 2022-06-01 PROCEDURE — 74011000250 HC RX REV CODE- 250: Performed by: STUDENT IN AN ORGANIZED HEALTH CARE EDUCATION/TRAINING PROGRAM

## 2022-06-01 RX ADMIN — MECLIZINE 25 MG: 12.5 TABLET ORAL at 06:00

## 2022-06-01 RX ADMIN — MELATONIN TAB 3 MG 3 MG: 3 TAB at 22:03

## 2022-06-01 RX ADMIN — MORPHINE SULFATE 15 MG: 15 TABLET ORAL at 04:39

## 2022-06-01 RX ADMIN — ONDANSETRON 4 MG: 4 TABLET, ORALLY DISINTEGRATING ORAL at 04:39

## 2022-06-01 RX ADMIN — MORPHINE SULFATE 15 MG: 15 TABLET ORAL at 22:03

## 2022-06-01 RX ADMIN — ONDANSETRON 4 MG: 4 TABLET, ORALLY DISINTEGRATING ORAL at 22:03

## 2022-06-01 NOTE — PROGRESS NOTES
Transition of Care Plan   RUR- Low 10%    DISPOSITION: The disposition [facility] ; pending medical progression   F/U with PCP/Specialist     Transport: RAA    Per conversation with the pt's daughter, Annita Mejia [phone]  ; This cm engaged her in a discussion regardingTOC. This cm inquired about the home setting and other social supports. She reported that the pt has 4 son that live in [place]. She reported that it is only her and her children that live locally that would be able to care for the pt. The pt's daughter reported that she works during the day so she wouldn't be able to provide the needed supports. She reported that if the pt were to discharge home at this time she would have to quit her job. She inquired about the pt transitioning to [place]. This cm informed her that at this time the pt is not stable enough to travel to [place] but he would check with the MD. This cm will continue to follow for AVERY needs.        CM: [address] [place],   [phone]

## 2022-06-01 NOTE — PROGRESS NOTES
1915 - Bedside and Verbal shift change report given to Angelo Solis RN (oncoming nurse) by Chico Jensen RN (offgoing nurse). Report included the following information SBAR, Kardex, MAR and Recent Results. 2300 - refused lidocaine patch. Asked pt if she was having any pain at all, including head and other areas of her body. Pt said she had a small amt of pain to her anterior ahead. Offered pt pain med but she denied need for it. LUE and LLE remains flaccid. Strong  to RUE and RLE strong. 0202 - pt awake, anxious, intermittently tearful. Spoke to pt in Montserratian but couldn't understand what pt was saying back as she was upset and becoming agitated. Contacted interpretor and spoke w/ Yohan Barros to communicate with pt. During conversation, pt informed interpretor that her head was hurting - 8/10 pain to anterior forehead and also c/o nausea. Asked pt if she would like pain meds at this time and she agreed. Gave Morphine 15mg PO and Zofran 4mg PO.    0500 - Assisted pt voiding w/ bedpan. Perineal care completed. Robert pad, gown, sheet and blanket changed after pt spilled water on them. 0600 - pt was resting quietly then suddenly began yelling. Pt informed this writer that she felt very dizzy and felt like she was going to fall out of bed. Assured pt she is safe, guard rails are up, bed low and locked and bed alarm is on. Gave Meclizine 25mg PO and educated her on indication for it. Tried to console pt by speaking to her in Mexican and massage to forehead which pt was very thankful for. Assured pt she is safe and tried relaxation techniques w/ her which helped to calm her. Resting in bed quietly now. 0715 - Bedside and Verbal shift change report given to Antoine Sellers RN (oncoming nurse) by Angelo Solis RN (offgoing nurse). Report included the following information SBAR, Kardex, MAR and Recent Results.

## 2022-06-01 NOTE — PROGRESS NOTES
0700  Shift change report received from Saji Bedolla  (Nurse). Report included review of SBAR, accordion report results review, orders, meds, ROS and POC. (things to be done) All questions answered. Transfer of care completed. 0900 Patient assessment completed using . 1000 Patient sleeping     1230 Patient complained of dizziness and restlessness. Brought patient PRN medications but she declined    1500 Patient's family at bedside. Families questions answered using . 1700 Patient sleeping.

## 2022-06-01 NOTE — PROGRESS NOTES
Problem: Mobility Impaired (Adult and Pediatric)  Goal: *Acute Goals and Plan of Care (Insert Text)  Description:   FUNCTIONAL STATUS PRIOR TO ADMISSION: Patient was independent and active without use of DME.    HOME SUPPORT PRIOR TO ADMISSION: The patient lived with family but did not require assist.    Physical Therapy Goals  Initiated 5/20/2022  1. Patient will move from supine to sit and sit to supine  in bed with minimal assistance/contact guard assist within 7 day(s). 2.  Patient will transfer from bed to chair and chair to bed with moderate assistance  using the least restrictive device within 7 day(s). 3.  Patient will perform sit to stand with minimal assistance/contact guard assist within 7 day(s). 4.  Patient will ambulate with moderate assistance  for 20 feet with the least restrictive device within 7 day(s). 6.  Patient will improve Peralta Balance score by 7 points within 7 days. Outcome: Progressing Towards Goal   PHYSICAL THERAPY TREATMENT  Patient: Genia Moore (18 y.o. female)  Date: 6/1/2022  Diagnosis: Left-sided weakness [R53.1] <principal problem not specified>       Precautions: Fall  Chart, physical therapy assessment, plan of care and goals were reviewed. ASSESSMENT  Patient continues with skilled PT services and is progressing towards goals. Patient demonstrates improved strength and function in the left UE > LE but with improvements in both. She is limited by strength and motor planning in the left LE especially causing her to become frustrated or substitute muscle groups when asked to complete a motion. Encouraged seated LAQs but noted more hip and knee flexion. Did note, however improved left knee extension allowing for WB in standing. Completed multiple sit to stands blocking her left knee and cuing for bilateral knee extension and an anterior weight shift. Able to step forward and back 2' with moderate assist and left knee blocking.  Placed a rk-walker in the room post session to trial next treatment. Progress remains self-limited d/t c/o dizziness with vertical activity. She asked to do more activity and be OOB but then requested to return supine with the onset of dizziness. BP assessed with position changes and noted to increase in standing this date. Her dizziness remained present even in supine post session. Per nursing, she was to receive meclizine later today. Perhaps she can be medicated for this prior to intervention to improve activity tolerance? Continue to recommend SNF rehab at discharge. Current Level of Function Impacting Discharge (mobility/balance): moderate assist sit to stand    Other factors to consider for discharge: Language barrier and patient doesn't respond to the  on occasion         PLAN :  Patient continues to benefit from skilled intervention to address the above impairments. Continue treatment per established plan of care. to address goals. Recommendation for discharge: (in order for the patient to meet his/her long term goals)  Therapy up to 5 days/week in SNF setting    This discharge recommendation:  Has been made in collaboration with the attending provider and/or case management    IF patient discharges home will need the following DME: to be determined (TBD)       SUBJECTIVE:   Patient stated I want to get up and do more.   \"I am so dizzy. I need to lay down. \"    OBJECTIVE DATA SUMMARY:   Critical Behavior:  Neurologic State: Alert  Orientation Level: Oriented X4  Cognition: Follows commands,Impaired decision making,Poor safety awareness  Safety/Judgement: Lack of insight into deficits  Functional Mobility Training:  Bed Mobility:  Rolling: Contact guard assistance  Supine to Sit: Contact guard assistance  Sit to Supine: Minimum assistance  Scooting: Minimum assistance        Transfers:  Sit to Stand: Minimum assistance; Moderate assistance;Assist x1  X5 trials with left knee blocked.  Posteriorly shifted COG improved with cuing and able to stand with bilateral knee extension with unilarteral support  Stand to Sit: Minimum assistance                             Balance:  Sitting: Impaired  Sitting - Static: Good (unsupported)  Sitting - Dynamic: Fair (occasional)  Standing: Impaired  Standing - Static: Poor;Constant support  Standing - Dynamic : Poor;Constant support  Ambulation/Gait Trainin' forward/back with right UE supported on bed rail and assist for weight shifts  Stairs: Therapeutic Exercises:   Left LAQ x10, hip/knee flexion more frequently  Pain Rating:      Activity Tolerance:   Fair    After treatment patient left in no apparent distress:   Sitting in chair and Call bell within reach    COMMUNICATION/COLLABORATION:   The patients plan of care was discussed with: Registered nurse.      Beryl Macario, PT, DPT   Time Calculation: 36 mins

## 2022-06-01 NOTE — PROGRESS NOTES
Hospitalist Progress Note    NAME: Eric Randall   :  1955   MRN:  [id number]   Room Number:  055/74  @ [facility]       Interim Hospital Summary: 78 y.o. 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  Intraparenchymal hemorrhage with edema and mass-effect POA  Mass-effect with 4 mm shift to admit for midline POA  Hypertension  Comfort measures POA  -Assessed by neurosurgery at outside hospital, no intervention recommended  -Assessed by neurology at outside hospital  -Assessed by palliative care medicine and patient made comfortable per family request in accordance patient wishes           -Haldol as needed for agitation  -Comfort measures only for now, however patient continues to improve. Speech/Swallowing to be re-evaluated, consult in place. Family meeting is still pending.   -Morphine as needed for pain  -Continue metoprolol  -Lidocaine patch  -Hospice was following  - Meclizine PRN for dizziness           There is no height or weight on file to calculate BMI. Code Status: DNR     Surrogate Decision Maker:     DVT Prophylaxis: Comfort measures   GI Prophylaxis: not indicated       Subjective:     Chief Complaint / Reason for Physician Visit  No acute issues. Discussed with RN events overnight. Review of Systems:  No fevers, chills, appetite change, cough, sputum production, shortness of breath, dyspnea on exertion, nausea, vomitting, diarrhea, constipation, chest pain, leg edema, abdominal pain, joint pain, rash, itching. Objective:     VITALS:   Last 24hrs VS reviewed since prior progress note.  Most recent are:  Patient Vitals for the past 24 hrs:   Temp Pulse Resp BP SpO2   22 0751 (!) 96.5 °F (35.8 °C) (!) 56 18 (!) 122/57 91 %   22 0345 98 °F (36.7 °C) 66 16 130/71 98 %   22 98 °F (36.7 °C) 66 16 122/61 98 %       Intake/Output Summary (Last 24 hours) at 6/1/2022 1035  Last data filed at 6/1/2022 0500  Gross per 24 hour   Intake 475 ml   Output 950 ml   Net -475 ml        PHYSICAL EXAM:  General: WD, WN. Alert, cooperative, no acute distress    EENT:  EOMI. Anicteric sclerae. MMM  Resp:  CTA bilaterally, no wheezing or rales. No accessory muscle use  CV:  Regular  rhythm,  normal S1/S2, no murmurs rubs gallops, No edema  GI:  Soft, Non distended, Non tender. +Bowel sounds  Neurologic:  Alert and oriented X self  Psych:    Not anxious nor agitated  Skin:  No rashes. No jaundice    Reviewed most current lab test results and cultures  YES  Reviewed most current radiology test results   YES  Review and summation of old records today    NO  Reviewed patient's current orders and MAR    YES  PMH/ reviewed - no change compared to H&P  ________________________________________________________________________  Care Plan discussed with:    Comments   Patient     Family      RN     Care Manager     Consultant                        Multidiciplinary team rounds were held today with , nursing, pharmacist and clinical coordinator. Patient's plan of care was discussed; medications were reviewed and discharge planning was addressed. ________________________________________________________________________  Total NON critical care TIME: 25   Minutes    Total CRITICAL CARE TIME Spent:   Minutes non procedure based      Comments   >50% of visit spent in counseling and coordination of care     ________________________________________________________________________  Tg Cee MD     Procedures: see electronic medical records for all procedures/Xrays and details which were not copied into this note but were reviewed prior to creation of Plan. LABS:  I reviewed today's most current labs and imaging studies. Pertinent labs include:  No results for input(s): WBC, HGB, HCT, PLT, HGBEXT, HCTEXT, PLTEXT, HGBEXT, HCTEXT, PLTEXT in the last 72 hours.   No results for input(s): NA, K, CL, CO2, GLU, BUN, CREA, CA, MG, PHOS, ALB, TBIL, TBILI, ALT, INR, INREXT, INREXT in the last 72 hours.     No lab exists for component: SGOT    Signed: Wilberto Guthrie MD

## 2022-06-01 NOTE — HOSPICE
Hospice Liaison Note:    Chart reviewed for updates in plan of care. New order for Hospice consult noted entered 5/31/2022. Spoke with bedside nurse, Paula Maynard RN. Per Paula Maynard and notes overnight, patient having some frustrations over language barrier. Per report, patient is eating and drinking without difficulty, and in no obvious distress today. Plan: [facility] will continue to provide Hospice support/education for patient, family and staff. Please call Hospice team to offer support for patient, family or staff.     Thank you for your coordination with the hospice plan of care      Ray Montero RN, [place] [id number] Nurse Liaison  [phone] [place]  [phone] Office

## 2022-06-02 PROCEDURE — 97535 SELF CARE MNGMENT TRAINING: CPT | Performed by: OCCUPATIONAL THERAPIST

## 2022-06-02 PROCEDURE — 65270000032 HC RM SEMIPRIVATE

## 2022-06-02 PROCEDURE — 97116 GAIT TRAINING THERAPY: CPT

## 2022-06-02 PROCEDURE — 97530 THERAPEUTIC ACTIVITIES: CPT

## 2022-06-02 PROCEDURE — 74011250636 HC RX REV CODE- 250/636: Performed by: STUDENT IN AN ORGANIZED HEALTH CARE EDUCATION/TRAINING PROGRAM

## 2022-06-02 PROCEDURE — 74011000250 HC RX REV CODE- 250: Performed by: STUDENT IN AN ORGANIZED HEALTH CARE EDUCATION/TRAINING PROGRAM

## 2022-06-02 PROCEDURE — 97112 NEUROMUSCULAR REEDUCATION: CPT | Performed by: OCCUPATIONAL THERAPIST

## 2022-06-02 PROCEDURE — 92610 EVALUATE SWALLOWING FUNCTION: CPT

## 2022-06-02 PROCEDURE — 74011250637 HC RX REV CODE- 250/637: Performed by: STUDENT IN AN ORGANIZED HEALTH CARE EDUCATION/TRAINING PROGRAM

## 2022-06-02 RX ADMIN — ACETAMINOPHEN 650 MG: 325 TABLET ORAL at 16:48

## 2022-06-02 RX ADMIN — MELATONIN TAB 3 MG 3 MG: 3 TAB at 21:27

## 2022-06-02 RX ADMIN — MECLIZINE 25 MG: 12.5 TABLET ORAL at 11:35

## 2022-06-02 RX ADMIN — MECLIZINE 25 MG: 12.5 TABLET ORAL at 16:48

## 2022-06-02 RX ADMIN — MORPHINE SULFATE 30 MG: 15 TABLET ORAL at 21:27

## 2022-06-02 RX ADMIN — MORPHINE SULFATE 30 MG: 15 TABLET ORAL at 13:24

## 2022-06-02 RX ADMIN — ONDANSETRON 4 MG: 4 TABLET, ORALLY DISINTEGRATING ORAL at 16:48

## 2022-06-02 NOTE — PROGRESS NOTES
Problem: Falls - Risk of  Goal: *Absence of Falls  Description: Document Artcamilo Jeanette Fall Risk and appropriate interventions in the flowsheet.   Outcome: Progressing Towards Goal  Note: Fall Risk Interventions:  Mobility Interventions: Bed/chair exit alarm    Mentation Interventions: Door open when patient unattended    Medication Interventions: Bed/chair exit alarm    Elimination Interventions: Call light in reach    History of Falls Interventions: Bed/chair exit alarm

## 2022-06-02 NOTE — PROGRESS NOTES
SPEECH PATHOLOGY BEDSIDE SWALLOW EVALUATION/DISCHARGE  Patient: Daniel Pierre (89 y.o. female)  Date: 6/2/2022  Primary Diagnosis: Left-sided weakness [R53.1]       Precautions: Fall    ASSESSMENT :  Based on the objective data described below, the patient presents with functional oropharyngeal swallow. She is edentulous and therefore will benefit from slightly softer items. Pharyngeal swallow suspected to be grossly WFL. No s/s aspiration with successive straw sips of thin liquids or solid. Skilled acute therapy provided by a speech-language pathologist is not indicated at this time. PLAN :  Recommendations:  Easy to chew (breads ok). Thin liquids    Discharge Recommendations: None     SUBJECTIVE:   Patient stated I can't eat much at [facility] through     OBJECTIVE:     Past Medical History:   Diagnosis Date    Psychotic disorder     concern for schizoaffective disorder     Past Surgical History:   Procedure Laterality Date    HX TUBAL LIGATION       Prior Level of Function/Home Situation  Home Situation  Home Environment: Trailer/mobile home  # Steps to Enter:  (?)  One/Two Story Residence: One story  Living Alone: No  Support Systems: Child(justin)  Patient Expects to be Discharged to[de-identified] Home with hospice  Current DME Used/Available at Home: Other (comment) (natalio)  Diet prior to admission: unknown  Current Diet:  Minced/moist   Cognitive and Communication Status:  Neurologic State: Alert,Restless  Orientation Level: Oriented to person  Cognition: Decreased attention/concentration,Decreased command following,Poor safety awareness  Perception: Cues to maintain midline in standing,Tactile,Verbal,Visual,Cues to attend to left side of body  Perseveration: No perseveration noted  Safety/Judgement: Decreased insight into deficits,Decreased awareness of need for safety,Decreased awareness of need for assistance,Fall prevention  Oral Assessment:     P.O.  Trials:  Patient Position: up in bed  Vocal quality prior to P.O.: No impairment  Consistency Presented: Thin liquid; Solid  How Presented: Self-fed/presented;Straw;Successive swallows     Bolus Acceptance: No impairment  Bolus Formation/Control: No impairment  Type of Impairment: Delayed  Propulsion: Delayed (# of seconds)  Oral Residue: None  Initiation of Swallow: No impairment  Laryngeal Elevation: Functional  Aspiration Signs/Symptoms: None              NOMS:   The NOMS functional outcome measure was used to quantify this patient's level of swallowing impairment. Based on the NOMS, the patient was determined to be at level 7 for swallow function     NOMS Swallowing Levels:  Level 1 (CN): NPO  Level 2 (CM): NPO but takes consistency in therapy  Level 3 (CL): Takes less than 50% of nutrition p.o. and continues with nonoral feedings; and/or safe with mod cues; and/or max diet restriction  Level 4 (CK): Safe swallow but needs mod cues; and/or mod diet restriction; and/or still requires some nonoral feeding/supplements  Level 5 (CJ): Safe swallow with min diet restriction; and/or needs min cues  Level 6 (CI): Independent with p.o.; rare cues; usually self cues; may need to avoid some foods or needs extra time  Level 7 ([address] [place] [address]): Independent for all p.o.  JAILENE. (2003). National Outcomes Measurement System (NOMS): Adult Speech-Language Pathology User's Guide. Pain:  Pain Scale 1: Numeric (0 - 10)  Pain Intensity 1: 0     After treatment:   Patient left in no apparent distress in bed, Call bell within reach and Nursing notified    COMMUNICATION/EDUCATION:       The patient's plan of care including recommendations, planned interventions, and recommended diet changes were discussed with: Registered nurse.      Thank you for this referral.  Dimple Yip, SLP  Time Calculation: 18 mins

## 2022-06-02 NOTE — PROGRESS NOTES
Problem: Self Care Deficits Care Plan (Adult)  Goal: *Acute Goals and Plan of Care (Insert Text)  Description: FUNCTIONAL STATUS PRIOR TO ADMISSION: Patient was independent and active without use of DME.    HOME SUPPORT: The patient lived with family but did not require assist.    Occupational Therapy Goals  Initiated 5/23/2022, Weekly re-evaluation 5/31/2022-continue all goals  1. Patient will perform seated grooming with minimal assistance  within 7 day(s). 2.  Patient will perform upper body dressing with moderate assistance  within 7 day(s). 3.  Patient will perform lower body dressing with moderate assistance within 7 day(s). 4.  Patient will perform toilet transfers to [facility] with moderate assistance within 7 day(s). 5.  Patient will perform all aspects of toileting with moderate assistance  within 7 day(s). 6.  Patient will participate in upper extremity therapeutic exercise/activities with maximal assistance within 7 day(s). 7.  Patient will utilize energy conservation and fall prevention techniques during functional activities with verbal cues within 7 day(s). 8.  Patient will improve their Fugl Harris score by 5 points in prep for ADLs within 7 days. Outcome: Not Progressing Towards Goal     OCCUPATIONAL THERAPY TREATMENT  Patient: Chadd Macdonald (70 y.o. female)  Date: 6/2/2022  Diagnosis: Left-sided weakness [R53.1] <principal problem not specified>       Precautions: DNR,Fall,Skin,Bed Alarm  Chart, occupational therapy assessment, plan of care, and goals were reviewed. ASSESSMENT  Patient continues with skilled OT services and is not progressing towards goals. Pt is limited by L side hemiparesis and inattention, poor attention to task, very [place] and [place] speaking only- [place]  utilized during session, uncontrollable athetoid movements of entire body, c/o BLE pain severe at times, decreased overall strength, endurance, mobility, balance, coordination and safety.   Clarified comfort measures and therapy. MD stated per Dr. Amie Andrew, medical director, therapy will be included in this pt's comfort measures unless signs that pt is enduring pain with therapy. Pt required mod A x2 for all OOB mobility and with very limited sitting tolerance due to dizziness. During functional mobility co-tx with PT, pt appeared to have an orthostatic response becoming unresponsive, rigid with extensor tone while standing. BP unable to be obtained and supine BP following was 149/53. RN and MD notified. Based on above and discharge plan for pt to return to [place] with family, pt will require max physical assist for ADLs and functional mobility and a reclining w/c with seat belt and elevating leg rests. Current Level of Function Impacting Discharge (ADLs): mod A UE ADLs, max-total A LE ADLs and toileting, mod A x2 OOB mobility    Other factors to consider for discharge: see above         PLAN :  Patient continues to benefit from skilled intervention to address the above impairments. Continue treatment and decreased established plan of care to 2x week to address goals. Recommend with staff: assist x2 for all OOB mobility. Encourage active participation in all ADLs as well as attention to L side of body    Recommend next OT session: ADLs, LUE neuro-re-ed, sitting and standing balance    Recommendation for discharge: (in order for the patient to meet his/her long term goals)  To be determined: plan is for pt wo return to [place] with family    This discharge recommendation:  Has been made in collaboration with the attending provider and/or case management    IF patient discharges home will need the following DME: reclining w/c with seat belt and elevating leg rests       SUBJECTIVE:   Patient stated My legs hurt.     OBJECTIVE DATA SUMMARY:   Cognitive/Behavioral Status:  Neurologic State: Alert; Restless  Orientation Level: Oriented to person  Cognition: Decreased attention/concentration;Decreased command following;Poor safety awareness  Perception: Cues to maintain midline in standing; Tactile;Verbal;Visual;Cues to attend to left side of body  Perseveration: No perseveration noted  Safety/Judgement: Decreased insight into deficits; Decreased awareness of need for safety;Decreased awareness of need for assistance; Fall prevention    Neuro Re-Education and Functional Mobility and Transfers for ADLs: Co-tx with PT as pt required the knowledge and skills of 2 therapists for safety, cognitive instruction, muscle facilitation and inhibition LUE and LE. Bed Mobility:  Supine to Sit: Moderate assistance; Additional time;Assist x1  Sit to Supine: Moderate assistance; Additional time;Assist x2  Scooting: Minimum assistance; Additional time;Assist x1    Transfers:  Sit to Stand: Moderate assistance; Additional time;Assist x2     Bed to Chair: Moderate assistance; Additional time;Assist x2 (poor attention to task, athetoid movements) - amb x 2 trials >5 feets with max manual cues for balance, LLE WB and muscle facilitation and wt shifting. Balance:  Sitting: Impaired  Sitting - Static: Fair (occasional)  Sitting - Dynamic: Fair (occasional)  Standing: Impaired;Pull to stand; With support  Standing - Static: Poor;Constant support  Standing - Dynamic : Poor;Constant support    ADL Intervention:  Lower Body Dressing Assistance  Socks: Minimum assistance (R sock using R hand, A to thread over 5th toe)  Leg Crossed Method Used: Yes  Position Performed: Long sitting on bed  Cues: Don;Physical assistance; Tactile cues provided;Verbal cues provided;Visual cues provided    Cognitive Retraining  Problem Solving: Identifying the task  Executive Functions: Executing cognitive plans  Attention to Task: Single task  Maintains Attention For (Time):  (5 seconds)  Safety/Judgement: Decreased insight into deficits; Decreased awareness of need for safety;Decreased awareness of need for assistance; Fall prevention  Cues: Tactile cues provided;Verbal cues provided;Visual cues provided      Pain:  C/o severe pain at times BLEs especially calves and shins    Activity Tolerance:   Fair and requires frequent rest breaks    After treatment patient left in no apparent distress:   Supine in bed and Call bell within reach    COMMUNICATION/COLLABORATION:   The patients plan of care was discussed with: Physical therapist, Registered nurse, Physician, and Case management.      Mandy Parker OT  Time Calculation: 39 mins

## 2022-06-02 NOTE — PROGRESS NOTES
Problem: Mobility Impaired (Adult and Pediatric)  Goal: *Acute Goals and Plan of Care (Insert Text)  Description:   FUNCTIONAL STATUS PRIOR TO ADMISSION: Patient was independent and active without use of DME.    HOME SUPPORT PRIOR TO ADMISSION: The patient lived with family but did not require assist.    Physical Therapy Goals  Initiated 5/20/2022; reviewed and continue 6/2/2022    1. Patient will move from supine to sit and sit to supine  in bed with minimal assistance/contact guard assist within 7 day(s). 2.  Patient will transfer from bed to chair and chair to bed with moderate assistance  using the least restrictive device within 7 day(s). 3.  Patient will perform sit to stand with minimal assistance/contact guard assist within 7 day(s). 4.  Patient will ambulate with moderate assistance  for 20 feet with the least restrictive device within 7 day(s). 6.  Patient will improve Peralta Balance score by 7 points within 7 days. Outcome: Progressing Towards Goal  PHYSICAL THERAPY TREATMENT: WEEKLY REASSESSMENT  Patient: Trevon Wells (45 y.o. female)  Date: 6/2/2022  Primary Diagnosis: Left-sided weakness [R53.1]       Precautions:   DNR,Fall,Skin,Bed Alarm      ASSESSMENT  Patient continues with skilled PT services and is not progressing towards goals. She remains limited by a considerable language barrier, limited carry over of education, impulsivity, athetoid movement (increased this date), ataxia, left sided weakness, impaired balance, and ? Orthostatic hypotension  following admission for a CVA. She continues to c/o dizziness with upright activity but was negative for orthostatic hypotension when assessed yesterday. Attempted to increase standing activity and gait this date after discussion yesterday. Reviewed concerns from prior intervention with both translation and visual demonstration prior to attempting stance.  Discussed the need for full left knee extension before advancing the right and to acquire standing balance before attempting to walk. Initiated gait with a rk walker and maximum assist x2. Upon standing she was limited by posterior LOB and left knee flexion while grabbing the therapist to her right for support rather than the rk-walker placed for her. Gait training completed x10' with maximum assist x2. Quickly transitioned to  bilateral HHA d/t patient non-use of the walker and continued forward movement despite continuing concerns and cues to stop for left LE foot placement. Gait with quality with scissoring, a narrow base of support, left ankle inversion, and soft knee extension. Slid the chair towards patient to allow her to sit and to discuss performance. Patient challenged with implementing and maintaining any of the gait corrections discussed prior to mobility. The  was utilized throughout session but the patient was unable to make changes with translation during dynamic activity. She required manual facilitation of left foot placement to prevent scissoring. Discussed concerns again and attempted a 2nd gait trial without turning and while utlizing a RW. She continued to require maximum assist x2 with  manual assist to maintain bilateral  on the walker and continued to ambulate forward despite gross difficulty advancing the left LE and cues to stop. After ~10' she stopped and started to buckle bilaterally. Sat patient on this writers knee and returned to a chair with assist from a 2nd therapist. In returning to the chair, she briefly assumed a rigid posture in full extension and became less responsive before sitting to the chair. Unable to obtain a BP again until supine but initial reading in supine was 149/53 mmHg. The patient remains a high fall risk and progression of gait has been limited by c/o dizziness and difficulty with communication d/t a language barrier, difficulty hearing, and poor implementation feedback during mobility.       Current plan is for discharge with family to [place]. She will require maximum assist of 1-2 people for stand pivot transfers only. Recommend a reclining wheelchair with a seat belt and elevating leg rests. Patient's progression toward goals since last assessment: slow to poor progression d/t limited carry over between sessions and difficulty implementing feedback    Current Level of Function Impacting Discharge (mobility/balance): maximum assist x2 for gait             PLAN :  Goals have been updated based on progression since last assessment. Patient continues to benefit from skilled intervention to address the above impairments. Recommendations and Planned Interventions: bed mobility training, transfer training, gait training, therapeutic exercises, neuromuscular re-education and therapeutic activities      Frequency/Duration: Patient will be followed by physical therapy:  5 times a week to address goals. Recommendation for discharge: (in order for the patient to meet his/her long term goals)  SNF if not with family to [place]    This discharge recommendation:  Has been made in collaboration with the attending provider and/or case management    IF patient discharges home will need the following DME: Recommend a reclining wheelchair with a seat belt and elevating leg rests.          SUBJECTIVE:   Patient stated .    OBJECTIVE DATA SUMMARY:   HISTORY:    Past Medical History:   Diagnosis Date    Psychotic disorder     concern for schizoaffective disorder     Past Surgical History:   Procedure Laterality Date    HX TUBAL LIGATION         Personal factors and/or comorbidities impacting plan of care:     Home Situation  Home Environment: Trailer/mobile home  # Steps to Enter:  (?)  One/Two Story Residence: One story  Living Alone: No  Support Systems: Child(justin)  Patient Expects to be Discharged to[de-identified] Home with hospice  Current DME Used/Available at Home: Other (comment) Verneice Gram)    EXAMINATION/PRESENTATION/DECISION MAKING:   Critical Behavior:  Neurologic State: Alert,Restless  Orientation Level: Oriented to person  Cognition: Decreased attention/concentration,Decreased command following,Poor safety awareness  Safety/Judgement: Decreased insight into deficits,Decreased awareness of need for safety,Decreased awareness of need for assistance,Fall prevention  Hearing: Auditory  Auditory Impairment: Hard of hearing, bilateral (amplifier with patient )  Functional Mobility:  Bed Mobility:     Supine to Sit: Moderate assistance; Additional time;Assist x1  Sit to Supine: Moderate assistance; Additional time;Assist x2  Scooting: Minimum assistance; Additional time;Assist x1  Transfers:  Sit to Stand: Moderate assistance; Additional time;Assist x2  Stand to Sit: Moderate assistance; Additional time;Assist x1        Bed to Chair: Moderate assistance; Additional time;Assist x2 (poor attention to task, athetoid movements)              Balance:   Sitting: Impaired  Sitting - Static: Fair (occasional)  Sitting - Dynamic: Fair (occasional)  Standing: Impaired;Pull to stand; With support  Standing - Static: Poor;Constant support  Standing - Dynamic : Poor;Constant support  Ambulation/Gait Training:  Distance (ft): 10 Feet (ft) (x2)  Assistive Device: Gait belt;Walker, rolling  Ambulation - Level of Assistance: Maximum assistance;Assist x2        Gait Abnormalities: Decreased step clearance; Ataxic;Hemiplegic;Scissoring;Path deviations;Trunk sway increased        Base of Support: Narrowed; Center of gravity altered  Stance: Left decreased  Speed/Marcelina: Fluctuations     Swing Pattern: Left asymmetrical         Activity Tolerance:   Poor; suspected orthostatic hypotension    After treatment patient left in no apparent distress:   Supine in bed and Call bell within reach    COMMUNICATION/EDUCATION:   The patients plan of care was discussed with: Registered nurse.      Fall prevention education was provided and the patient/caregiver indicated understanding., Patient/family have participated as able in goal setting and plan of care. and Patient/family agree to work toward stated goals and plan of care.     Thank you for this referral.  Beryl Macario, PT, DPT   Time Calculation: 39 mins

## 2022-06-02 NOTE — PROGRESS NOTES
Hospitalist Progress Note    NAME: Crystal Roy   :  1955   MRN:  [id number]   Room Number:  108/13  @ [address] [facility]       Interim Hospital Summary: 78 y.o. 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  Intraparenchymal hemorrhage with edema and mass-effect POA  Mass-effect with 4 mm shift to admit for midline POA  Hypertension  Comfort measures POA  -Assessed by neurosurgery at outside hospital, no intervention recommended  -Assessed by neurology at outside hospital  -Assessed by palliative care medicine and patient made comfortable per family request in accordance patient wishes           -Haldol as needed for agitation  -Comfort measures only for now, however patient continues to improve. - Speech therapy has evaluated patient and recommended easy to chew, thin liquids  - PT has evaluated patient   -Morphine as needed for pain  -Continue metoprolol  -Lidocaine patch  -Hospice was following  - Meclizine PRN for dizziness           There is no height or weight on file to calculate BMI. Code Status: DNR     Surrogate Decision Maker:     DVT Prophylaxis: Comfort measures   GI Prophylaxis: not indicated       Subjective:     Chief Complaint / Reason for Physician Visit  Patient participated with PT today. Discussed with RN events overnight. Review of Systems:  No fevers, chills, appetite change, cough, sputum production, shortness of breath, dyspnea on exertion, nausea, vomitting, diarrhea, constipation, chest pain, leg edema, abdominal pain, joint pain, rash, itching. Tolerating PT/OT. Tolerating diet. Objective:     VITALS:   Last 24hrs VS reviewed since prior progress note.  Most recent are:  Patient Vitals for the past 24 hrs:   Temp Pulse Resp BP SpO2   22 0729 97.9 °F (36.6 °C) (!) 58 15 102/65 95 %   22 2110 97.4 °F (36.3 °C) 70 22 (!) 143/63 --     No intake or output data in the 24 hours ending 06/02/22 7105     PHYSICAL EXAM:  General: Alert, cooperative, no acute distress    EENT:  EOMI. Anicteric sclerae. MMM  Resp:  CTA bilaterally, no wheezing or rales. No accessory muscle use  CV:  Regular  rhythm,  normal S1/S2, no murmurs rubs gallops, No edema  GI:  Soft, Non distended, Non tender. +Bowel sounds  Neurologic:  Alert  Psych:   Good insight. Not anxious nor agitated  Skin:  No rashes. No jaundice    Reviewed most current lab test results and cultures  YES  Reviewed most current radiology test results   YES  Review and summation of old records today    NO  Reviewed patient's current orders and MAR    YES  PMH/SH reviewed - no change compared to H&P  ________________________________________________________________________  Care Plan discussed with:    Comments   Patient x    Family      RN x    Care Manager x    Consultant                       x Multidiciplinary team rounds were held today with , nursing, pharmacist and clinical coordinator. Patient's plan of care was discussed; medications were reviewed and discharge planning was addressed. ________________________________________________________________________  Total NON critical care TIME:  15  Minutes    Total CRITICAL CARE TIME Spent:   Minutes non procedure based      Comments   >50% of visit spent in counseling and coordination of care     ________________________________________________________________________  Balwinder Galvan MD     Procedures: see electronic medical records for all procedures/Xrays and details which were not copied into this note but were reviewed prior to creation of Plan. LABS:  I reviewed today's most current labs and imaging studies. Pertinent labs include:  No results for input(s): WBC, HGB, HCT, PLT, HGBEXT, HCTEXT, PLTEXT in the last 72 hours.   No results for input(s): NA, K, CL, CO2, GLU, BUN, CREA, CA, MG, PHOS, ALB, TBIL, TBILI, ALT, INR, INREXT in the last 72 hours.     No lab exists for component: SGOT    Signed: Sree Harris MD

## 2022-06-02 NOTE — PROGRESS NOTES
Bedside shift change report given to Hussain Schofield (oncoming nurse) by Tomy Emmanuel (offgoing nurse). Report included the following information SBAR, Intake/Output and MAR.     Disha Naranjo, patients blood pressure this morning was 102/65, Pulse 58 bpm, Can we hold the morning dose of metoprolol. 0930 Patient rounded on, no needs expressed none identified. 1135 Patient in bed conversing with interpretor , physical therapy at bedside. 1324 Patient rounded on, pain medication given for crying and reporting pain. 1648 Patient rounded on, staff present at bedside. 1735 Patient rounded on, repositioned and made comfortable for dinner    Bedside shift change report given to Kvng Ovalles (oncoming nurse) by Hussain Schofield (offgoing nurse). Report included the following information SBAR, Kardex and MAR.

## 2022-06-03 PROCEDURE — 65270000032 HC RM SEMIPRIVATE

## 2022-06-03 PROCEDURE — 74011250637 HC RX REV CODE- 250/637: Performed by: STUDENT IN AN ORGANIZED HEALTH CARE EDUCATION/TRAINING PROGRAM

## 2022-06-03 PROCEDURE — 97116 GAIT TRAINING THERAPY: CPT | Performed by: PHYSICAL THERAPIST

## 2022-06-03 PROCEDURE — 74011250636 HC RX REV CODE- 250/636: Performed by: STUDENT IN AN ORGANIZED HEALTH CARE EDUCATION/TRAINING PROGRAM

## 2022-06-03 PROCEDURE — 74011000250 HC RX REV CODE- 250: Performed by: STUDENT IN AN ORGANIZED HEALTH CARE EDUCATION/TRAINING PROGRAM

## 2022-06-03 RX ORDER — CYCLOBENZAPRINE HCL 10 MG
5 TABLET ORAL
Status: DISCONTINUED | OUTPATIENT
Start: 2022-06-03 | End: 2022-06-22 | Stop reason: HOSPADM

## 2022-06-03 RX ADMIN — MECLIZINE 25 MG: 12.5 TABLET ORAL at 23:27

## 2022-06-03 RX ADMIN — METOPROLOL SUCCINATE 25 MG: 25 TABLET, EXTENDED RELEASE ORAL at 08:02

## 2022-06-03 RX ADMIN — MECLIZINE 25 MG: 12.5 TABLET ORAL at 08:02

## 2022-06-03 RX ADMIN — CYCLOBENZAPRINE 5 MG: 10 TABLET, FILM COATED ORAL at 11:54

## 2022-06-03 RX ADMIN — MORPHINE SULFATE 15 MG: 15 TABLET ORAL at 23:36

## 2022-06-03 RX ADMIN — CYCLOBENZAPRINE 5 MG: 10 TABLET, FILM COATED ORAL at 23:28

## 2022-06-03 RX ADMIN — ONDANSETRON 4 MG: 4 TABLET, ORALLY DISINTEGRATING ORAL at 11:54

## 2022-06-03 RX ADMIN — ACETAMINOPHEN 650 MG: 325 TABLET ORAL at 08:02

## 2022-06-03 NOTE — HOSPICE
[address] [facility] Help to Those in Need  [phone]     Patient Name: Philipp South  YOB: 1955  Age: 78 y.o. [facility] RN Note:  Chart reviewed, patient has been improving since being at [facility]. Patient is tolerating oral intake w/o signs of dysphagic. Cleared by speech for thin liquids and diet as tolerated. Continues to work w/ PT/OT. Discussed w/ hospice MD Dr Raul Carney. Given the improvement in clinical picture, patient no longer appears to have a qualifying hospice diagnosis. Hospice team will sign off at this time  please feel free to reach out if clinical picture changes. Thank you for the opportunity to be of service to this patient.

## 2022-06-03 NOTE — PROGRESS NOTES
7297) Bedside shift change report given to Kojo Webster RN (oncoming nurse) by Daisy Chanel RN (offgoing nurse). Report included the following information SBAR, Kardex and MAR.   0663)  [id number]--GC complaint of dizziness, pain, and coldness. Pt request tylenol for pain. 9953) pt resting quietly  1035) IDR with Dr. Omaira ADAMS), Elmer Rivero (pharmacist), Carlos Eastman (), Liat Barros (nurse supervisor),  and Kojo Webster (RN) to discuss plan of care including pain control for L side--cramping in L arm and numbness pain in L leg--consider flexeril or gabapentin, planning to talk with family  [phone]-- Zofran and flexeril given  [phone]) Discuss with Mirta, wound care, blanching red area L heel--pt kicking away pillows; could try heel protector. 1920) Bedside shift change report given to Meghan Scott RN (oncoming nurse) by Kojo Webster RN (offgoing nurse). Report included the following information SBAR, Kardex and MAR.  Family at the bedside including pt daughter

## 2022-06-03 NOTE — PROGRESS NOTES
Problem: Mobility Impaired (Adult and Pediatric)  Goal: *Acute Goals and Plan of Care (Insert Text)  Description:   FUNCTIONAL STATUS PRIOR TO ADMISSION: Patient was independent and active without use of DME.    HOME SUPPORT PRIOR TO ADMISSION: The patient lived with family but did not require assist.    Physical Therapy Goals  Initiated 5/20/2022; reviewed and continue 6/2/2022    1. Patient will move from supine to sit and sit to supine  in bed with minimal assistance/contact guard assist within 7 day(s). 2.  Patient will transfer from bed to chair and chair to bed with moderate assistance  using the least restrictive device within 7 day(s). 3.  Patient will perform sit to stand with minimal assistance/contact guard assist within 7 day(s). 4.  Patient will ambulate with moderate assistance  for 20 feet with the least restrictive device within 7 day(s). 6.  Patient will improve Peralta Balance score by 7 points within 7 days. Outcome: Progressing Towards Goal     PHYSICAL THERAPY TREATMENT  Patient: Genia Moore (24 y.o. female)  Date: 6/3/2022  Diagnosis: Left-sided weakness [R53.1] <principal problem not specified>       Precautions: DNR,Fall,Skin,Bed Alarm  Chart, physical therapy assessment, plan of care and goals were reviewed. ASSESSMENT  Patient continues with skilled PT services and is progressing towards goals. Patient required moderate assist x2 to stand. Patient performed two gait trials with maximal assist x2. Patient continues to demonstrate left LE and UE weakness, impaired coordination, and decreased activity tolerance. Patient required manual cues to facilitate left LE to improve gait training. Current Level of Function Impacting Discharge (mobility/balance): mod to max A x2       PLAN :  Patient continues to benefit from skilled intervention to address the above impairments. Continue treatment per established plan of care. to address goals.     Recommendation for discharge: (in order for the patient to meet his/her long term goals)  Therapy 3 hours per day 5-7 days per week    This discharge recommendation:  Has been made in collaboration with the attending provider and/or case management    IF patient discharges home will need the following DME: wheelchair       SUBJECTIVE:   Patient stated Shaun Ards.   utilized. OBJECTIVE DATA SUMMARY:   Critical Behavior:  Neurologic State: Alert  Orientation Level: Oriented to person  Cognition: Poor safety awareness  Safety/Judgement: Decreased insight into deficits,Decreased awareness of need for safety,Decreased awareness of need for assistance,Fall prevention  Functional Mobility Training:  Bed Mobility:     Supine to Sit: Moderate assistance; Additional time  Sit to Supine: Moderate assistance; Additional time  Scooting: Minimum assistance; Additional time        Transfers:  Sit to Stand: Moderate assistance;Assist x2; Additional time  Stand to Sit: Moderate assistance;Assist x2; Additional time        Bed to Chair: Moderate assistance;Assist x2; Additional time    Balance:  Sitting: Impaired  Sitting - Static: Fair (occasional)  Sitting - Dynamic: Fair (occasional)  Standing: Impaired  Standing - Static: Constant support;Poor  Standing - Dynamic : Constant support;Poor    Ambulation/Gait Training:  Distance (ft): 10 Feet (ft) (x2)  Assistive Device: Gait belt  Ambulation - Level of Assistance: Maximum assistance;Assist x2  Gait Abnormalities: Decreased step clearance; Hemiplegic; Ataxic;Trunk sway increased; Path deviations  Base of Support: Narrowed  Stance: Left decreased  Speed/Marcelina: Fluctuations  Swing Pattern: Left asymmetrical     Pain Rating:  No pain    Activity Tolerance:   Good    After treatment patient left in no apparent distress:   Supine in bed, Call bell within reach, Bed / chair alarm activated, and Side rails x 3    COMMUNICATION/COLLABORATION:   The patients plan of care was discussed with: Physical therapist and Registered nurse.      Albin Vargas, PT   Time Calculation: 28 mins

## 2022-06-03 NOTE — PROGRESS NOTES
Problem: Falls - Risk of  Goal: *Absence of Falls  Description: Document Jemima Portillo Fall Risk and appropriate interventions in the flowsheet. Outcome: Progressing Towards Goal  Note: Fall Risk Interventions:  Mobility Interventions: Bed/chair exit alarm    Mentation Interventions: Door open when patient unattended    Medication Interventions: Bed/chair exit alarm    Elimination Interventions: Call light in reach    History of Falls Interventions: Bed/chair exit alarm         Problem: Patient Education: Go to Patient Education Activity  Goal: Patient/Family Education  Outcome: Progressing Towards Goal     Problem: Pressure Injury - Risk of  Goal: *Prevention of pressure injury  Description: Document Eugene Scale and appropriate interventions in the flowsheet.   Outcome: Progressing Towards Goal  Note: Pressure Injury Interventions:  Sensory Interventions: Assess changes in LOC    Moisture Interventions: Absorbent underpads    Activity Interventions: Pressure redistribution bed/mattress(bed type)    Mobility Interventions: Float heels    Nutrition Interventions: Offer support with meals,snacks and hydration    Friction and Shear Interventions: HOB 30 degrees or less                Problem: Patient Education: Go to Patient Education Activity  Goal: Patient/Family Education  Outcome: Progressing Towards Goal     Problem: Patient Education: Go to Patient Education Activity  Goal: Patient/Family Education  Outcome: Progressing Towards Goal     Problem: Patient Education: Go to Patient Education Activity  Goal: Patient/Family Education  Outcome: Progressing Towards Goal

## 2022-06-03 NOTE — PROGRESS NOTES
Hospitalist Progress Note    NAME: Lucio Gray   :  1955   MRN:  [id number]   Room Number:  240/09  @ [facility]     Please note that this dictation was completed with Paul Rod, the computer voice recognition software. Quite often unanticipated grammatical, syntax, homophones, and other interpretive errors are inadvertently transcribed by the computer software. Please disregard these errors. Please excuse any errors that have escaped final proofreading. Interim Hospital Summary: 78 y.o. 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 - Speech therapy has evaluated patient and recommended easy to chew, thin liquids  - PT has evaluated patient   -Morphine as needed for pain  -Continue metoprolol  -Lidocaine patch  -Hospice was following  - Meclizine PRN for dizziness  - trying to reach family to revisit goals of care         There is no height or weight on file to calculate BMI. Code Status: DNR     Surrogate Decision Maker:     DVT Prophylaxis: Comfort measures   GI Prophylaxis: not indicated             Subjective:     Chief Complaint / Reason for Physician Visit  Cramping in hand  Discussed with RN events overnight.      Review of Systems:  No fevers, chills, appetite change, cough, sputum production, shortness of breath, dyspnea on exertion, nausea, vomitting, diarrhea, constipation, chest pain, leg edema, abdominal pain, joint pain, rash, itching. Tolerating PT/OT. Tolerating diet. Objective:     VITALS:   Last 24hrs VS reviewed since prior progress note. Most recent are:  Patient Vitals for the past 24 hrs:   Temp Pulse Resp BP SpO2   06/03/22 0729 97.6 °F (36.4 °C) 60 18 125/61 96 %   06/02/22 2133 98.8 °F (37.1 °C) 60 16 (!) 135/58 96 %     No intake or output data in the 24 hours ending 06/03/22 1355     PHYSICAL EXAM:  General: Alert, cooperative, no acute distress    EENT:  EOMI. Anicteric sclerae. MMM  Resp:  CTA bilaterally, no wheezing or rales. No accessory muscle use  CV:  Regular  rhythm,  normal S1/S2, no murmurs rubs gallops, No edema  GI:  Soft, Non distended, Non tender. +Bowel sounds  Neurologic:  Alert and oriented X 3  Psych:   Good insight. Not anxious nor agitated  Skin:  No rashes. No jaundice    Reviewed most current lab test results and cultures  YES  Reviewed most current radiology test results   YES  Review and summation of old records today    NO  Reviewed patient's current orders and MAR    YES  PMH/SH reviewed - no change compared to H&P  ________________________________________________________________________  Care Plan discussed with:    Comments   Patient x    Family      RN x    Care Manager x    Consultant                       x Multidiciplinary team rounds were held today with , nursing, pharmacist and clinical coordinator. Patient's plan of care was discussed; medications were reviewed and discharge planning was addressed.      ________________________________________________________________________  Total NON critical care TIME:  15   Minutes    Total CRITICAL CARE TIME Spent:   Minutes non procedure based      Comments   >50% of visit spent in counseling and coordination of care     ________________________________________________________________________  Milena Mi MD     Procedures: see electronic medical records for all procedures/Xrays and details which were not copied into this note but were reviewed prior to creation of Plan. LABS:  I reviewed today's most current labs and imaging studies. Pertinent labs include:  No results for input(s): WBC, HGB, HCT, PLT, HGBEXT, HCTEXT, PLTEXT in the last 72 hours. No results for input(s): NA, K, CL, CO2, GLU, BUN, CREA, CA, MG, PHOS, ALB, TBIL, TBILI, ALT, INR, INREXT in the last 72 hours.     No lab exists for component: SGOT    Signed: Prateek Ludwig MD

## 2022-06-03 NOTE — PROGRESS NOTES
AVERY     RUR 9 %     IDR Rounds again this am with MD and Team   Continue plan of care   MARJORIE Undetermined at this time   Discussion with MD to see if patent could stable enough to travel back to her home  Discussed DME needs with PT. Call from Hospice today- to signed off at this time. If condition changes they will reopen with a new consult- see their note. Plan   To call Nabil Tristan [phone]  About the feasibility of traveling to   [facility] 3 orders for Wheelchair to fax application to the free foundation to see what type of chair they may have. Determine what accommodation is needed if family is able to gather the funds.    Follow-progress with PT & OT       Grisel TINOCO RN   [phone]

## 2022-06-04 PROCEDURE — 74011250637 HC RX REV CODE- 250/637: Performed by: STUDENT IN AN ORGANIZED HEALTH CARE EDUCATION/TRAINING PROGRAM

## 2022-06-04 PROCEDURE — 74011000250 HC RX REV CODE- 250: Performed by: STUDENT IN AN ORGANIZED HEALTH CARE EDUCATION/TRAINING PROGRAM

## 2022-06-04 PROCEDURE — 65270000032 HC RM SEMIPRIVATE

## 2022-06-04 RX ADMIN — CYCLOBENZAPRINE 5 MG: 10 TABLET, FILM COATED ORAL at 22:33

## 2022-06-04 RX ADMIN — POLYETHYLENE GLYCOL (3350) 17 G: 17 POWDER, FOR SOLUTION ORAL at 16:08

## 2022-06-04 RX ADMIN — METOPROLOL SUCCINATE 25 MG: 25 TABLET, EXTENDED RELEASE ORAL at 09:12

## 2022-06-04 RX ADMIN — MORPHINE SULFATE 15 MG: 15 TABLET ORAL at 17:27

## 2022-06-04 RX ADMIN — MORPHINE SULFATE 15 MG: 15 TABLET ORAL at 22:34

## 2022-06-04 NOTE — PROGRESS NOTES
Bedside shift change report given to Kita ROJAS (oncoming nurse) by Selene Wang RN (offgoing nurse). Report included the following information SBAR, Kardex, and MAR. 2045- patient assessment done using a . Incontinent care done    2340- pt. c/o pain, dizziness and muscle spasm. PRN meclizine PO 25mg, Flexeril tab 5mg PO and Morphine IR tab 15 mg PO administered. Incontinent care done. 0100- incontinent care done    0400- pt resting quietly in bed    0600- pt resting quietly in bed    Bedside\"} shift change report given to Christiano Mcelroy (oncoming nurse) by René Otoole RN (offgoing nurse).  Report included the following information SBAR, MAR, Kardex

## 2022-06-04 NOTE — PROGRESS NOTES
0730- bedside shift change report given to lyla (oncoming nurse) by Chelsey Yuan (offgoing nurse). Report included the following information SBAR, karadex and overnight events. Patient sleeping at this time. 0049- patient seen and assessed. Meds given. Patient refuses to use  so she is very difficult to assess. Patient appears comfortably in bed and has no complaints at this time. Patient is eating independently. Unable to obtain temp at this time. Attempted both oral and axillary with 3 different thermometers. Patient is comfort measures only and does not feel hot or cool to touch but warm. 1015- patient resting in bed. No complaints     1050- round completed with MD sotelo. No new updates at this time continue current course. 1104- attempted to recheck temp was ambulate to obtain. 1208- patient resting in bed. No complaints. 46- assisted patient with bedpan. 1330- patient resting in bed     1438- patient resting in bed, no complaints    1532- patient resting in bed. No complaints    1550- patient placed on bedpan to have a bowel movement    1610- patient complaints of constipation given prn miralax. Patient bathed and linens changed. Patient is very uncooperative during bath and gown changed rolling from side to side and making dressing her very difficult. Asked patient to use  refusing. Denies pain. 1713- patient resting in bed eating dinner    1728- given prn 15 mg morphine for pain to left arm. 2046- patient sleeping    1918- shift reports given to KEREN pritchett by keren arita. Patient resting in bed.

## 2022-06-04 NOTE — PROGRESS NOTES
Problem: Falls - Risk of  Goal: *Absence of Falls  Description: Document Santino Betancur Fall Risk and appropriate interventions in the flowsheet. Outcome: Progressing Towards Goal  Note: Fall Risk Interventions:  Mobility Interventions: Bed/chair exit alarm    Mentation Interventions: Bed/chair exit alarm    Medication Interventions: Evaluate medications/consider consulting pharmacy    Elimination Interventions: Call light in reach    History of Falls Interventions: Bed/chair exit alarm         Problem: Pressure Injury - Risk of  Goal: *Prevention of pressure injury  Description: Document Eugene Scale and appropriate interventions in the flowsheet.   Outcome: Progressing Towards Goal  Note: Pressure Injury Interventions:  Sensory Interventions: Assess changes in LOC    Moisture Interventions: Absorbent underpads    Activity Interventions: PT/OT evaluation    Mobility Interventions: HOB 30 degrees or less    Nutrition Interventions: Offer support with meals,snacks and hydration    Friction and Shear Interventions: HOB 30 degrees or less                Problem: Patient Education: Go to Patient Education Activity  Goal: Patient/Family Education  Outcome: Progressing Towards Goal

## 2022-06-04 NOTE — PROGRESS NOTES
Problem: Falls - Risk of  Goal: *Absence of Falls  Description: Document Milena Owens Fall Risk and appropriate interventions in the flowsheet.   Outcome: Progressing Towards Goal  Note: Fall Risk Interventions:  Mobility Interventions: Bed/chair exit alarm,Assess mobility with egress test,Communicate number of staff needed for ambulation/transfer,OT consult for ADLs,Patient to call before getting OOB,PT Consult for mobility concerns,PT Consult for assist device competence,Strengthening exercises (ROM-active/passive),Utilize walker, cane, or other assistive device    Mentation Interventions: Adequate sleep, hydration, pain control,Bed/chair exit alarm,Door open when patient unattended,Evaluate medications/consider consulting pharmacy,Eyeglasses and hearing aids,Familiar objects from home,Increase mobility,More frequent rounding,Reorient patient,Room close to nurse's station,Toileting rounds,Update white board    Medication Interventions: Evaluate medications/consider consulting pharmacy,Bed/chair exit alarm,Assess postural VS orthostatic hypotension,Patient to call before getting OOB,Teach patient to arise slowly    Elimination Interventions: Call light in reach,Bed/chair exit alarm,Toileting schedule/hourly rounds,Toilet paper/wipes in reach,Patient to call for help with toileting needs    History of Falls Interventions: Bed/chair exit alarm,Consult care management for discharge planning,Door open when patient unattended,Evaluate medications/consider consulting pharmacy,Investigate reason for fall,Room close to nurse's station,Assess for delayed presentation/identification of injury for 48 hrs (comment for end date),Utilize gait belt for transfer/ambulation         Problem: Patient Education: Go to Patient Education Activity  Goal: Patient/Family Education  Outcome: Progressing Towards Goal     Problem: Pressure Injury - Risk of  Goal: *Prevention of pressure injury  Description: Document Eugene Scale and appropriate interventions in the flowsheet. Outcome: Progressing Towards Goal  Note: Pressure Injury Interventions:  Sensory Interventions: Assess changes in LOC,Assess need for specialty bed,Check visual cues for pain,Discuss PT/OT consult with provider,Keep linens dry and wrinkle-free,Maintain/enhance activity level,Minimize linen layers,Pad between skin to skin,Pressure redistribution bed/mattress (bed type),Turn and reposition approx.  every two hours (pillows and wedges if needed),Chair cushion    Moisture Interventions: Absorbent underpads,Apply protective barrier, creams and emollients,Check for incontinence Q2 hours and as needed,Limit adult briefs,Maintain skin hydration (lotion/cream),Minimize layers,Offer toileting Q_hr    Activity Interventions: PT/OT evaluation,Pressure redistribution bed/mattress(bed type),Increase time out of bed,Assess need for specialty bed,Chair cushion    Mobility Interventions: HOB 30 degrees or less,Float heels,Assess need for specialty bed,PT/OT evaluation    Nutrition Interventions: Document food/fluid/supplement intake,Discuss nutritional consult with provider,Offer support with meals,snacks and hydration    Friction and Shear Interventions: Apply protective barrier, creams and emollients,Feet elevated on foot rest,Foam dressings/transparent film/skin sealants,HOB 30 degrees or less,Minimize layers                Problem: Patient Education: Go to Patient Education Activity  Goal: Patient/Family Education  Outcome: Progressing Towards Goal     Problem: Patient Education: Go to Patient Education Activity  Goal: Patient/Family Education  Outcome: Progressing Towards Goal     Problem: Patient Education: Go to Patient Education Activity  Goal: Patient/Family Education  Outcome: Progressing Towards Goal

## 2022-06-05 PROCEDURE — 65270000032 HC RM SEMIPRIVATE

## 2022-06-05 PROCEDURE — 74011250637 HC RX REV CODE- 250/637: Performed by: STUDENT IN AN ORGANIZED HEALTH CARE EDUCATION/TRAINING PROGRAM

## 2022-06-05 PROCEDURE — 74011000250 HC RX REV CODE- 250: Performed by: STUDENT IN AN ORGANIZED HEALTH CARE EDUCATION/TRAINING PROGRAM

## 2022-06-05 RX ADMIN — CYCLOBENZAPRINE 5 MG: 10 TABLET, FILM COATED ORAL at 09:09

## 2022-06-05 RX ADMIN — POLYETHYLENE GLYCOL (3350) 17 G: 17 POWDER, FOR SOLUTION ORAL at 09:09

## 2022-06-05 RX ADMIN — METOPROLOL SUCCINATE 25 MG: 25 TABLET, EXTENDED RELEASE ORAL at 09:07

## 2022-06-05 NOTE — PROGRESS NOTES
0710- shift report given to keren arita by keren ennis and ashley reviewed. Patient resting in bed.     0756- patient sleeping at this time    0830- patient sitting in bed eating breakfast     0906- patient seen and assessed. Patient continued to refused  and is difficult to assess. Patient c/o pain to right sided neck sore/spasms but mild cannot obtain sore. Given miralax for constipation    1022- rounds completed no new updates. 1031- patient resting in bed. Patient provided with water. Denies pain at this time. 1131- patient resting in bed. No complaints. 1245- patient resting in bed. Snacking. No complaints    1330- patient resting in bed. No complaints     1427- patient resting in bed. No complaints    1530- provided patient water. No complaints     1629- checked on patient. Resting in bed.  No complaints    1732- patient resting in bed eating dinner

## 2022-06-05 NOTE — PROGRESS NOTES
shift change report given to Christiano Mcelroy RN (oncoming nurse) by Robles Grace RN (offgoing nurse). Report included the following information SBAR, Kardex, intake/output and MAR.    2000- Assessment completed     2215: Pt c/o pain in her left leg PRN Morphine 15mg tab  PO and Flexeril  5 mg tab administered. 0010- pt resting comfortable in bed.    0200- pt rounded on. No needs needed    0400- pt. Sleeping. Denies any needs    0630- pt. Resting quietly in bed    Bedside\"} shift change report given to(oncoming nurse) by Asha Jha (offgoing nurse).  Report included the following information SBAR, Kardex, MAR, intake/output

## 2022-06-05 NOTE — PROGRESS NOTES
Problem: Falls - Risk of  Goal: *Absence of Falls  Description: Document Jigar Muro Fall Risk and appropriate interventions in the flowsheet. Outcome: Progressing Towards Goal  Note: Fall Risk Interventions:  Mobility Interventions: Bed/chair exit alarm    Mentation Interventions: Adequate sleep, hydration, pain control    Medication Interventions: Bed/chair exit alarm    Elimination Interventions: Call light in reach    History of Falls Interventions: Bed/chair exit alarm         Problem: Patient Education: Go to Patient Education Activity  Goal: Patient/Family Education  Outcome: Progressing Towards Goal     Problem: Pressure Injury - Risk of  Goal: *Prevention of pressure injury  Description: Document Eugene Scale and appropriate interventions in the flowsheet.   Outcome: Progressing Towards Goal  Note: Pressure Injury Interventions:  Sensory Interventions: Assess changes in LOC    Moisture Interventions: Apply protective barrier, creams and emollients    Activity Interventions: PT/OT evaluation    Mobility Interventions: Float heels    Nutrition Interventions: Document food/fluid/supplement intake    Friction and Shear Interventions: Apply protective barrier, creams and emollients                Problem: Patient Education: Go to Patient Education Activity  Goal: Patient/Family Education  Outcome: Progressing Towards Goal

## 2022-06-05 NOTE — PROGRESS NOTES
Hospitalist Progress Note    NAME: Chadd Macdonald   :  1955   MRN:  [id number]   Room Number:  945/13  @ [facility]     Please note that this dictation was completed with [employer], the computer voice recognition software. Quite often unanticipated grammatical, syntax, homophones, and other interpretive errors are inadvertently transcribed by the computer software. Please disregard these errors. Please excuse any errors that have escaped final proofreading. Interim Hospital Summary: 78 y.o. 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 - Speech therapy has evaluated patient and recommended easy to chew, thin liquids  - PT has evaluated patient   -Morphine as needed for pain  -Continue metoprolol  -Lidocaine patch  -Hospice was following  - Meclizine PRN for dizziness  - trying to reach family to revisit goals of care         There is no height or weight on file to calculate BMI. Code Status: DNR     Surrogate Decision Maker:     DVT Prophylaxis: Comfort measures   GI Prophylaxis: not indicated             Subjective:     Chief Complaint / Reason for Physician Visit  No acute issues  Discussed with RN events overnight.      Review of Systems:  No fevers, chills, appetite change, cough, sputum production, shortness of breath, dyspnea on exertion, nausea, vomitting, diarrhea, constipation, chest pain, leg edema, abdominal pain, joint pain, rash, itching. Tolerating PT/OT. Tolerating diet. Objective:     VITALS:   Last 24hrs VS reviewed since prior progress note. Most recent are:  Patient Vitals for the past 24 hrs:   Temp Pulse Resp BP SpO2   06/05/22 0717 98.3 °F (36.8 °C) 96 16 99/60 100 %   06/04/22 2045 97.3 °F (36.3 °C) 65 17 111/62 96 %     No intake or output data in the 24 hours ending 06/05/22 1319     PHYSICAL EXAM:  General: WD, WN. Alert, cooperative, no acute distress    EENT:  EOMI. Anicteric sclerae. MMM  Resp:  CTA bilaterally, no wheezing or rales. No accessory muscle use  CV:  Regular  rhythm,  normal S1/S2, no murmurs rubs gallops, No edema  GI:  Soft, Non distended, Non tender. +Bowel sounds  Neurologic:  Alert and oriented X 3, abnormal involuntary movements  Psych:   Good insight. Not anxious nor agitated  Skin:  No rashes. No jaundice    Reviewed most current lab test results and cultures  YES  Reviewed most current radiology test results   YES  Review and summation of old records today    NO  Reviewed patient's current orders and MAR    YES  PMH/ reviewed - no change compared to H&P  ________________________________________________________________________  Care Plan discussed with:    Comments   Patient x    Family      RN x    Care Manager x    Consultant                       x Multidiciplinary team rounds were held today with , nursing, pharmacist and clinical coordinator. Patient's plan of care was discussed; medications were reviewed and discharge planning was addressed.      ________________________________________________________________________  Total NON critical care TIME: 15  Minutes    Total CRITICAL CARE TIME Spent:   Minutes non procedure based      Comments   >50% of visit spent in counseling and coordination of care     ________________________________________________________________________  Yessenia Beck MD     Procedures: see electronic medical records for all procedures/Xrays and details which were not copied into this note but were reviewed prior to creation of Plan. LABS:  I reviewed today's most current labs and imaging studies. Pertinent labs include:  No results for input(s): WBC, HGB, HCT, PLT, HGBEXT, HCTEXT, PLTEXT in the last 72 hours. No results for input(s): NA, K, CL, CO2, GLU, BUN, CREA, CA, MG, PHOS, ALB, TBIL, TBILI, ALT, INR, INREXT in the last 72 hours.     No lab exists for component: SGOT    Signed: Piper Franks MD

## 2022-06-05 NOTE — PROGRESS NOTES
Problem: Falls - Risk of  Goal: *Absence of Falls  Description: Document Loc Aguilar Fall Risk and appropriate interventions in the flowsheet. Outcome: Progressing Towards Goal  Note: Fall Risk Interventions:  Mobility Interventions: Assess mobility with egress test,Bed/chair exit alarm,Communicate number of staff needed for ambulation/transfer,OT consult for ADLs,Patient to call before getting OOB,PT Consult for mobility concerns,PT Consult for assist device competence,Strengthening exercises (ROM-active/passive)    Mentation Interventions: Adequate sleep, hydration, pain control,Bed/chair exit alarm,Door open when patient unattended,Evaluate medications/consider consulting pharmacy,Eyeglasses and hearing aids,Familiar objects from home,Increase mobility,More frequent rounding,Reorient patient,Room close to nurse's station,Toileting rounds,Update white board    Medication Interventions: Assess postural VS orthostatic hypotension,Bed/chair exit alarm,Evaluate medications/consider consulting pharmacy,Patient to call before getting OOB,Teach patient to arise slowly    Elimination Interventions: Bed/chair exit alarm,Call light in reach,Toileting schedule/hourly rounds,Urinal in reach    History of Falls Interventions: Bed/chair exit alarm,Consult care management for discharge planning,Door open when patient unattended,Evaluate medications/consider consulting pharmacy,Room close to nurse's station         Problem: Patient Education: Go to Patient Education Activity  Goal: Patient/Family Education  Outcome: Progressing Towards Goal     Problem: Pressure Injury - Risk of  Goal: *Prevention of pressure injury  Description: Document Eugene Scale and appropriate interventions in the flowsheet.   Outcome: Progressing Towards Goal  Note: Pressure Injury Interventions:  Sensory Interventions: Assess changes in LOC,Assess need for specialty bed,Chair cushion,Check visual cues for pain,Discuss PT/OT consult with provider,Keep linens dry and wrinkle-free,Maintain/enhance activity level,Minimize linen layers,Monitor skin under medical devices    Moisture Interventions: Absorbent underpads,Apply protective barrier, creams and emollients,Assess need for specialty bed,Check for incontinence Q2 hours and as needed,Limit adult briefs,Maintain skin hydration (lotion/cream),Minimize layers,Moisture barrier,Offer toileting Q_hr    Activity Interventions: Chair cushion,Increase time out of bed,Pressure redistribution bed/mattress(bed type),PT/OT evaluation,Assess need for specialty bed    Mobility Interventions: Assess need for specialty bed,Chair cushion,Float heels,HOB 30 degrees or less,Pressure redistribution bed/mattress (bed type),PT/OT evaluation    Nutrition Interventions: Document food/fluid/supplement intake,Discuss nutritional consult with provider,Offer support with meals,snacks and hydration    Friction and Shear Interventions: Apply protective barrier, creams and emollients,HOB 30 degrees or less,Lift sheet,Minimize layers                Problem: Patient Education: Go to Patient Education Activity  Goal: Patient/Family Education  Outcome: Progressing Towards Goal     Problem: Patient Education: Go to Patient Education Activity  Goal: Patient/Family Education  Outcome: Progressing Towards Goal     Problem: Patient Education: Go to Patient Education Activity  Goal: Patient/Family Education  Outcome: Progressing Towards Goal

## 2022-06-05 NOTE — PROGRESS NOTES
Hospitalist Progress Note    NAME: Trish Ortiz   :  1955   MRN:  [id number]   Room Number:  803/37  @ [facility]     Please note that this dictation was completed with [employer], the computer voice recognition software. Quite often unanticipated grammatical, syntax, homophones, and other interpretive errors are inadvertently transcribed by the computer software. Please disregard these errors. Please excuse any errors that have escaped final proofreading. Interim Hospital Summary: 78 y.o. 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 - Speech therapy has evaluated patient and recommended easy to chew, thin liquids  - PT has evaluated patient   -Morphine as needed for pain  -Continue metoprolol  -Lidocaine patch  -Hospice was following  - Meclizine PRN for dizziness  - trying to reach family to revisit goals of care         There is no height or weight on file to calculate BMI. Code Status: DNR     Surrogate Decision Maker:     DVT Prophylaxis: Comfort measures   GI Prophylaxis: not indicated                Subjective:     Chief Complaint / Reason for Physician Visit  No acute issues  Discussed with RN events overnight.      Review of Systems:  No fevers, chills, appetite change, cough, sputum production, shortness of breath, dyspnea on exertion, nausea, vomitting, diarrhea, constipation, chest pain, leg edema, abdominal pain, joint pain, rash, itching. Tolerating PT/OT. Tolerating diet. Objective:     VITALS:   Last 24hrs VS reviewed since prior progress note. Most recent are:  Patient Vitals for the past 24 hrs:   Temp Pulse Resp BP SpO2   06/05/22 0717 98.3 °F (36.8 °C) 96 16 99/60 100 %   06/04/22 2045 97.3 °F (36.3 °C) 65 17 111/62 96 %     No intake or output data in the 24 hours ending 06/05/22 1320     PHYSICAL EXAM:  General: WD, WN. Alert, cooperative, no acute distress    EENT:  EOMI. Anicteric sclerae. MMM  Resp:  CTA bilaterally, no wheezing or rales. No accessory muscle use  CV:  Regular  rhythm,  normal S1/S2, no murmurs rubs gallops, No edema  GI:  Soft, Non distended, Non tender. +Bowel sounds  Neurologic:  Alert and oriented X 3, +dysphagia  Psych:   Good insight. Not anxious nor agitated  Skin:  No rashes. No jaundice    Reviewed most current lab test results and cultures  YES  Reviewed most current radiology test results   YES  Review and summation of old records today    NO  Reviewed patient's current orders and MAR    YES  PMH/SH reviewed - no change compared to H&P  ________________________________________________________________________  Care Plan discussed with:    Comments   Patient x    Family      RN x    Care Manager x    Consultant                       x Multidiciplinary team rounds were held today with , nursing, pharmacist and clinical coordinator. Patient's plan of care was discussed; medications were reviewed and discharge planning was addressed.      ________________________________________________________________________  Total NON critical care TIME:  15   Minutes    Total CRITICAL CARE TIME Spent:   Minutes non procedure based      Comments   >50% of visit spent in counseling and coordination of care     ________________________________________________________________________  Reyes Irizarry MD     Procedures: see electronic medical records for all procedures/Xrays and details which were not copied into this note but were reviewed prior to creation of Plan. LABS:  I reviewed today's most current labs and imaging studies. Pertinent labs include:  No results for input(s): WBC, HGB, HCT, PLT, HGBEXT, HCTEXT, PLTEXT in the last 72 hours. No results for input(s): NA, K, CL, CO2, GLU, BUN, CREA, CA, MG, PHOS, ALB, TBIL, TBILI, ALT, INR, INREXT in the last 72 hours.     No lab exists for component: SGOT    Signed: Gogo Meza MD

## 2022-06-06 PROCEDURE — 74011000250 HC RX REV CODE- 250: Performed by: STUDENT IN AN ORGANIZED HEALTH CARE EDUCATION/TRAINING PROGRAM

## 2022-06-06 PROCEDURE — 97110 THERAPEUTIC EXERCISES: CPT

## 2022-06-06 PROCEDURE — 74011250637 HC RX REV CODE- 250/637: Performed by: STUDENT IN AN ORGANIZED HEALTH CARE EDUCATION/TRAINING PROGRAM

## 2022-06-06 PROCEDURE — 97530 THERAPEUTIC ACTIVITIES: CPT

## 2022-06-06 PROCEDURE — 65270000032 HC RM SEMIPRIVATE

## 2022-06-06 RX ORDER — GABAPENTIN 100 MG/1
100 CAPSULE ORAL 2 TIMES DAILY
Status: DISCONTINUED | OUTPATIENT
Start: 2022-06-06 | End: 2022-06-09

## 2022-06-06 RX ADMIN — CYCLOBENZAPRINE 5 MG: 10 TABLET, FILM COATED ORAL at 09:02

## 2022-06-06 RX ADMIN — MELATONIN TAB 3 MG 3 MG: 3 TAB at 20:50

## 2022-06-06 RX ADMIN — MORPHINE SULFATE 30 MG: 15 TABLET ORAL at 20:49

## 2022-06-06 RX ADMIN — CYCLOBENZAPRINE 5 MG: 10 TABLET, FILM COATED ORAL at 16:52

## 2022-06-06 RX ADMIN — HALOPERIDOL 2 MG: 2 SOLUTION ORAL at 20:49

## 2022-06-06 RX ADMIN — GABAPENTIN 100 MG: 100 CAPSULE ORAL at 20:50

## 2022-06-06 NOTE — PROGRESS NOTES
Hospitalist Progress Note    NAME: Chadd Macdonald   :  1955   MRN:  [id number]   Room Number:    @ [facility]     Please note that this dictation was completed with [employer], the computer voice recognition software. Quite often unanticipated grammatical, syntax, homophones, and other interpretive errors are inadvertently transcribed by the computer software. Please disregard these errors. Please excuse any errors that have escaped final proofreading. Interim Hospital Summary: 78 y.o. 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 - Speech therapy has evaluated patient and recommended easy to chew, thin liquids  - PT has evaluated patient   -Morphine as needed for pain  -Continue metoprolol  -Lidocaine patch  -Hospice was following  - Meclizine PRN for dizziness  - trying to reach family to revisit goals of care         There is no height or weight on file to calculate BMI. Code Status: DNR     Surrogate Decision Maker:     DVT Prophylaxis: Comfort measures   GI Prophylaxis: not indicated                  Subjective:     Chief Complaint / Reason for Physician Visit  No acute issues Discussed with RN events overnight.      Review of Systems:  No fevers, chills, appetite change, cough, sputum production, shortness of breath, dyspnea on exertion, nausea, vomitting, diarrhea, constipation, chest pain, leg edema, abdominal pain, joint pain, rash, itching. Tolerating PT/OT. Tolerating diet. Objective:     VITALS:   Last 24hrs VS reviewed since prior progress note. Most recent are:  Patient Vitals for the past 24 hrs:   Temp Pulse Resp BP SpO2   06/06/22 0730 96.9 °F (36.1 °C) (!) 57 18 (!) 122/58 97 %   06/05/22 2022 98.1 °F (36.7 °C) 65 16 (!) 112/59 93 %     No intake or output data in the 24 hours ending 06/06/22 1454     PHYSICAL EXAM:  General: Alert, cooperative, no acute distress    EENT:  EOMI. Anicteric sclerae. MMM  Resp:  CTA bilaterally, no wheezing or rales. No accessory muscle use  CV:  Regular  rhythm,  normal S1/S2, no murmurs rubs gallops, No edema  GI:  Soft, Non distended, Non tender. +Bowel sounds  Neurologic:  Alert, oriented to self and place  Psych:   Fair insight. Not anxious nor agitated  Skin:  No rashes. No jaundice    Reviewed most current lab test results and cultures  YES  Reviewed most current radiology test results   YES  Review and summation of old records today    NO  Reviewed patient's current orders and MAR    YES  PMH/SH reviewed - no change compared to H&P  ________________________________________________________________________  Care Plan discussed with:    Comments   Patient x    Family      RN x    Care Manager x    Consultant                        Multidiciplinary team rounds were held today with , nursing, pharmacist and clinical coordinator. Patient's plan of care was discussed; medications were reviewed and discharge planning was addressed.      ________________________________________________________________________  Total NON critical care TIME: 15   Minutes    Total CRITICAL CARE TIME Spent:   Minutes non procedure based      Comments   >50% of visit spent in counseling and coordination of care     ________________________________________________________________________  Ismael Dennison MD     Procedures: see electronic medical records for all procedures/Xrays and details which were not copied into this note but were reviewed prior to creation of Plan. LABS:  I reviewed today's most current labs and imaging studies. Pertinent labs include:  No results for input(s): WBC, HGB, HCT, PLT, HGBEXT, HCTEXT, PLTEXT in the last 72 hours. No results for input(s): NA, K, CL, CO2, GLU, BUN, CREA, CA, MG, PHOS, ALB, TBIL, TBILI, ALT, INR, INREXT in the last 72 hours.     No lab exists for component: SGOT    Signed: Tg Cee MD

## 2022-06-06 NOTE — PROGRESS NOTES
Physical therapy:     Chart reviewed. Patient declining out of bed mobility secondary to dizziness. Will attempt as able.      [place], PT

## 2022-06-06 NOTE — PROGRESS NOTES
Spiritual Care Partner Volunteer visited patient at [facility] in [address] on 6/6/2022   Documented by:     ALEXSANDRA Pedraza, [facility], Staff [address] [place] [address] [employer] Paging Service  [phone]

## 2022-06-06 NOTE — PROGRESS NOTES
Transition of Care Plan:    RUR 9 %      IDR Rounds again this am with MD and Team   Continue plan of care   MARJORIE Undetermined at this time   Discussion with MD to see if patent could stable enough to travel back to her home  Discussed DME needs with PT. Spoke with Iban Hernadez regarding patient being cleared for discharge. Mr. Patrick Gómez stated the family wants to fly patient to [place]. Will need a wheelchair and will request DME from the [facility]. Family needs a note from Dr. 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      Prince Richards LCSW, [facility]-SW  Case Management [address]  C: [phone]

## 2022-06-06 NOTE — PROGRESS NOTES
Problem: Falls - Risk of  Goal: *Absence of Falls  Description: Document Marciana Distance Fall Risk and appropriate interventions in the flowsheet. Outcome: Progressing Towards Goal  Note: Fall Risk Interventions:  Mobility Interventions: Bed/chair exit alarm,Assess mobility with egress test,Communicate number of staff needed for ambulation/transfer,OT consult for ADLs,Patient to call before getting OOB,PT Consult for mobility concerns,PT Consult for assist device competence,Strengthening exercises (ROM-active/passive),Utilize walker, cane, or other assistive device    Mentation Interventions: Adequate sleep, hydration, pain control,Bed/chair exit alarm,Door open when patient unattended,Evaluate medications/consider consulting pharmacy,Increase mobility,More frequent rounding,Reorient patient,Room close to nurse's station,Toileting rounds    Medication Interventions: Assess postural VS orthostatic hypotension,Bed/chair exit alarm,Evaluate medications/consider consulting pharmacy,Patient to call before getting OOB,Teach patient to arise slowly    Elimination Interventions: Bed/chair exit alarm,Call light in reach,Elevated toilet seat,Patient to call for help with toileting needs,Toilet paper/wipes in reach,Toileting schedule/hourly rounds,Urinal in reach    History of Falls Interventions: Bed/chair exit alarm,Consult care management for discharge planning,Door open when patient unattended,Evaluate medications/consider consulting pharmacy,Investigate reason for fall,Room close to nurse's station         Problem: Patient Education: Go to Patient Education Activity  Goal: Patient/Family Education  Outcome: Progressing Towards Goal     Problem: Pressure Injury - Risk of  Goal: *Prevention of pressure injury  Description: Document Eugene Scale and appropriate interventions in the flowsheet.   Outcome: Progressing Towards Goal  Note: Pressure Injury Interventions:  Sensory Interventions: Assess changes in LOC,Assess need for specialty bed,Avoid rigorous massage over bony prominences,Chair cushion,Discuss PT/OT consult with provider,Keep linens dry and wrinkle-free,Maintain/enhance activity level,Minimize linen layers,Monitor skin under medical devices,Pressure redistribution bed/mattress (bed type),Pad between skin to skin    Moisture Interventions: Absorbent underpads,Apply protective barrier, creams and emollients,Assess need for specialty bed,Check for incontinence Q2 hours and as needed,Limit adult briefs,Maintain skin hydration (lotion/cream),Minimize layers,Moisture barrier,Offer toileting Q_hr    Activity Interventions: Assess need for specialty bed,Increase time out of bed,PT/OT evaluation,Pressure redistribution bed/mattress(bed type),Chair cushion    Mobility Interventions: Assess need for specialty bed,Chair cushion,Float heels,HOB 30 degrees or less,Pressure redistribution bed/mattress (bed type),PT/OT evaluation,Turn and reposition approx.  every two hours(pillow and wedges)    Nutrition Interventions: Document food/fluid/supplement intake,Discuss nutritional consult with provider,Offer support with meals,snacks and hydration    Friction and Shear Interventions: Apply protective barrier, creams and emollients,Feet elevated on foot rest,HOB 30 degrees or less,Minimize layers                Problem: Patient Education: Go to Patient Education Activity  Goal: Patient/Family Education  Outcome: Progressing Towards Goal     Problem: Patient Education: Go to Patient Education Activity  Goal: Patient/Family Education  Outcome: Progressing Towards Goal     Problem: Patient Education: Go to Patient Education Activity  Goal: Patient/Family Education  Outcome: Progressing Towards Goal

## 2022-06-06 NOTE — PROGRESS NOTES
0715- bedside shift change report given to lyla (oncoming nurse) by Anna Marie Ricci (offgoing nurse). Report included the following information SBAR, karadex and overnight events. Patient sleeping     0756- patient sleeping at this time. 2277- given flexeril for left arm pain unable to communicate pain score. patient stated in Norwegian medium level of pain. Refusing     4288- pain reassessed. Patient denies pain currently    7397- assisted patient to bedpan. 1013- PT at bedside working with patient    1024- rounds completed with MD Ruthie Kapoor continue currently coarse. We need to contact family for discharge planning but difficult to reach. Patient needs wheelchair with seatbelt and family to take back to [facility]    1104- patient sleeping at this time    1440- patient sleeping at this time. 1652- given prn flexeril for left sided shoulder pain.

## 2022-06-06 NOTE — PROGRESS NOTES
Bedside shift change report given to [facility] (oncoming nurse) by Jean-Pierre Ashley (offgoing nurse). Report included the following information SBAR, Kardex, Intake/Output, MAR and Recent Results.

## 2022-06-06 NOTE — PROGRESS NOTES
Problem: Self Care Deficits Care Plan (Adult)  Goal: *Acute Goals and Plan of Care (Insert Text)  Description: FUNCTIONAL STATUS PRIOR TO ADMISSION: Patient was independent and active without use of DME.    HOME SUPPORT: The patient lived with family but did not require assist.    Occupational Therapy Goals  Initiated 5/23/2022, Weekly re-evaluation 6/6/22- continue all goals  1. Patient will perform seated grooming with minimal assistance  within 7 day(s). 2.  Patient will perform upper body dressing with moderate assistance  within 7 day(s). 3.  Patient will perform lower body dressing with moderate assistance within 7 day(s). 4.  Patient will perform toilet transfers to [facility] with moderate assistance within 7 day(s). 5.  Patient will perform all aspects of toileting with moderate assistance  within 7 day(s). 6.  Patient will participate in upper extremity therapeutic exercise/activities with maximal assistance within 7 day(s). 7.  Patient will utilize energy conservation and fall prevention techniques during functional activities with verbal cues within 7 day(s). 8.  Patient will improve their Fugl Harris score by 5 points in prep for ADLs within 7 days. Outcome: Progressing Towards Goal     OCCUPATIONAL THERAPY TREATMENT  Patient: Khari Mims (89 y.o. female)  Date: 6/6/2022  Diagnosis: Left-sided weakness [R53.1] <principal problem not specified>       Precautions: DNR,Fall,Skin,Bed Alarm  Chart, occupational therapy assessment, plan of care, and goals were reviewed. ASSESSMENT  Patient continues with skilled OT services and is not progressing towards goals on this date. Patient had limited tolerance for therapies on this date, reporting dizziness and unwillingness to complete out of bed therapies. Patient did respond well to manual techniques for management of left wrist and arm pain.  Patient has fluctuating levels of performance; verbalizing inability to move left arm but then utilizing arm for scratching forehead or hugging across chest. Ongoing OT interventions recommended for neuro re-ed of LUE, adaptive technique training for ADL's, for improving dynamic sitting and standing balance, and to promote increased engagement in ADL's for overall decreased burden of care. Ongoing utilization of  is recommended. Patient responded well to wrist distraction for pain management in left wrist.     Current Level of Function Impacting Discharge (ADLs):unable to fully assess on this date due to patient unwillingness for transferring out of bed. Other factors to consider for discharge: will need assistance with all aspects of transfers and self care. Will require DME. PLAN :  Patient continues to benefit from skilled intervention to address the above impairments. Continue treatment per established plan of care to address goals. Recommend with staff:  (Swedish)    Recommend next OT session: , rk dressing, neuro re-ed and HEP for management of LUE      Recommendation for discharge: (in order for the patient to meet his/her long term goals)  To be determined: based on progress    This discharge recommendation:  A follow-up discussion with the attending provider and/or case management is planned    IF patient discharges home will need the following DME: pending progress; at this point in time she would require a wheelchair, bedside commode (if bathroom is inaccessible)       SUBJECTIVE:   Patient stated I'm feeling so dizzy today. I can't do anything.  via     OBJECTIVE DATA SUMMARY:   Cognitive/Behavioral Status:  Neurologic State: Agitated; Appropriate for age  Orientation Level: Oriented to person;Oriented to place  Cognition: Follows commands    Functional Mobility and Transfers for ADLs:  Unwilling to transfer out of bed on this date due to reports of dizziness  Balance:  Unable to fully assess on this date    ADL Intervention:    Grooming  Grooming Assistance: Set-up  Position Performed:  (semifowler position)  Washing Face: Set-up  Washing Hands: Set-up    Therapeutic Exercises:   Wrist distraction completed on left side to manage pain; 45 second holds x 3 reps. Wrist distraction with simultaneous wrist extension completed for 45 seconds x3 reps. AAROM of left wrist completed, 1 set of 10 reps of wrist extension, requiring minimal physical assistance against gravity. Pain:  Did not rate. Activity Tolerance:   Poor    After treatment patient left in no apparent distress:   Supine in bed, Call bell within reach, and Bed / chair alarm activated    COMMUNICATION/COLLABORATION:   The patients plan of care was discussed with: Physical therapist and Registered nurse.      Mani Langston OT  Time Calculation: 25 mins

## 2022-06-07 PROCEDURE — 74011250637 HC RX REV CODE- 250/637: Performed by: STUDENT IN AN ORGANIZED HEALTH CARE EDUCATION/TRAINING PROGRAM

## 2022-06-07 PROCEDURE — 74011250636 HC RX REV CODE- 250/636: Performed by: STUDENT IN AN ORGANIZED HEALTH CARE EDUCATION/TRAINING PROGRAM

## 2022-06-07 PROCEDURE — 97530 THERAPEUTIC ACTIVITIES: CPT

## 2022-06-07 PROCEDURE — 74011000250 HC RX REV CODE- 250: Performed by: STUDENT IN AN ORGANIZED HEALTH CARE EDUCATION/TRAINING PROGRAM

## 2022-06-07 PROCEDURE — 65270000032 HC RM SEMIPRIVATE

## 2022-06-07 RX ADMIN — MECLIZINE 25 MG: 12.5 TABLET ORAL at 20:44

## 2022-06-07 RX ADMIN — GABAPENTIN 100 MG: 100 CAPSULE ORAL at 09:34

## 2022-06-07 RX ADMIN — MECLIZINE 25 MG: 12.5 TABLET ORAL at 14:58

## 2022-06-07 RX ADMIN — MELATONIN TAB 3 MG 3 MG: 3 TAB at 20:44

## 2022-06-07 RX ADMIN — MECLIZINE 25 MG: 12.5 TABLET ORAL at 09:42

## 2022-06-07 RX ADMIN — CYCLOBENZAPRINE 5 MG: 10 TABLET, FILM COATED ORAL at 09:34

## 2022-06-07 RX ADMIN — HALOPERIDOL 2 MG: 2 SOLUTION ORAL at 20:47

## 2022-06-07 RX ADMIN — ACETAMINOPHEN 650 MG: 325 TABLET ORAL at 12:52

## 2022-06-07 RX ADMIN — ONDANSETRON 4 MG: 4 TABLET, ORALLY DISINTEGRATING ORAL at 12:52

## 2022-06-07 RX ADMIN — CYCLOBENZAPRINE 5 MG: 10 TABLET, FILM COATED ORAL at 23:01

## 2022-06-07 RX ADMIN — CYCLOBENZAPRINE 5 MG: 10 TABLET, FILM COATED ORAL at 17:45

## 2022-06-07 RX ADMIN — GABAPENTIN 100 MG: 100 CAPSULE ORAL at 17:45

## 2022-06-07 RX ADMIN — ACETAMINOPHEN 650 MG: 325 TABLET ORAL at 02:23

## 2022-06-07 NOTE — PROGRESS NOTES
AVERY- D/c to home with wheelchair. CM went to meet with pt and noted that her son in law, Doug Bauer, was in her room. CM went over the [facility] application for a wheelchair with him and he signed the application. CM faxed the application, face sheet, order for wheelchair and specs (from PT) to the [facility].  Jojo Perdue

## 2022-06-07 NOTE — PROGRESS NOTES
Problem: Mobility Impaired (Adult and Pediatric)  Goal: *Acute Goals and Plan of Care (Insert Text)  Description:   FUNCTIONAL STATUS PRIOR TO ADMISSION: Patient was independent and active without use of DME.    HOME SUPPORT PRIOR TO ADMISSION: The patient lived with family but did not require assist.    Physical Therapy Goals  Initiated 5/20/2022; reviewed and continue 6/2/2022    1. Patient will move from supine to sit and sit to supine  in bed with minimal assistance/contact guard assist within 7 day(s). 2.  Patient will transfer from bed to chair and chair to bed with moderate assistance  using the least restrictive device within 7 day(s). 3.  Patient will perform sit to stand with minimal assistance/contact guard assist within 7 day(s). 4.  Patient will ambulate with moderate assistance  for 20 feet with the least restrictive device within 7 day(s). 6.  Patient will improve Peralta Balance score by 7 points within 7 days. Outcome: Progressing Towards Goal   PHYSICAL THERAPY TREATMENT  Patient: Genia Moore (52 y.o. female)  Date: 6/7/2022  Diagnosis: Left-sided weakness [R53.1] <principal problem not specified>       Precautions: DNR,Fall,Skin,Bed Alarm  Chart, physical therapy assessment, plan of care and goals were reviewed. ASSESSMENT  Patient continues with skilled PT services and is minimally progressing towards goals. Pt continues to report dizziness and difficulty taking a breath during mobility. Use of  though pt with delayed responses and minimal verbalizations. Minimal command following to correct for safety and physical deficits. Measured for w/c sitting EOB(see additional note). Noted pt's BP sitting EOB 85/59 initially but improves to 112/56 with seated exercises and increased time at EOB. Stands x 3 with Min-Mod A and poor use of rk walker despite verbal and visual cueing. Deferred gait training as only A x 1 available and pt continuing to c/o dizziness.   Returned to supine where pt continued to look uncomfortable. Reporting increased tingling in LLE. RN in room to assess. Current Level of Function Impacting Discharge (mobility/balance): Max A for mobility    Other factors to consider for discharge: communication barrier, continues to report feeling bad         PLAN :  Patient continues to benefit from skilled intervention to address the above impairments. Continue treatment per established plan of care. to address goals. Recommendation for discharge: (in order for the patient to meet his/her long term goals)  Therapy up to 5 days/week in SNF setting    This discharge recommendation:  Has been made in collaboration with the attending provider and/or case management    IF patient discharges home will need the following DME: wheelchair       SUBJECTIVE:   Patient stated I'm dizzy.     OBJECTIVE DATA SUMMARY:   Critical Behavior:  Neurologic State: Agitated  Orientation Level: Unable to verbalize  Cognition: Decreased command following,Impulsive,Decreased attention/concentration  Safety/Judgement: Decreased insight into deficits,Decreased awareness of need for safety,Decreased awareness of need for assistance,Fall prevention  Functional Mobility Training:  Bed Mobility:     Supine to Sit: Contact guard assistance  Sit to Supine: Minimum assistance           Transfers:  Sit to Stand: Minimum assistance  Stand to Sit: Moderate assistance                             Balance:  Sitting: Impaired  Sitting - Static: Fair (occasional)  Standing: Impaired  Standing - Static: Poor;Constant support  Standing - Dynamic : Poor;Constant support  Ambulation/Gait Training:                                 Activity Tolerance:   Poor    After treatment patient left in no apparent distress:   Supine in bed, Call bell within reach, Bed / chair alarm activated, and Side rails x 3    COMMUNICATION/COLLABORATION:   The patients plan of care was discussed with: Registered nurseSocorro Green Pily Ha, PT   Time Calculation: 34 mins

## 2022-06-07 NOTE — PROGRESS NOTES
Hospitalist Progress Note    NAME: Jt Ortiz   :  1955   MRN:  [id number]   Room Number:  766/80  @ [facility]     Please note that this dictation was completed with Sb Domínguez, the computer voice recognition software. Quite often unanticipated grammatical, syntax, homophones, and other interpretive errors are inadvertently transcribed by the computer software. Please disregard these errors. Please excuse any errors that have escaped final proofreading. Interim Hospital Summary: 78 y.o. 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 - Speech therapy has evaluated patient and recommended easy to chew, thin liquids  - PT has evaluated patient   -Morphine as needed for pain  -Continue metoprolol  -Lidocaine patch  -Hospice was following  - Meclizine PRN for dizziness  - trying to reach family to revisit goals of care         There is no height or weight on file to calculate BMI. Code Status: DNR     Surrogate Decision Maker:     DVT Prophylaxis: Comfort measures   GI Prophylaxis: not indicated                  Subjective:     Chief Complaint / Reason for Physician Visit  No acute issues Discussed with RN events overnight.      Review of Systems:  No fevers, chills, appetite change, cough, sputum production, shortness of breath, dyspnea on exertion, nausea, vomitting, diarrhea, constipation, chest pain, leg edema, abdominal pain, joint pain, rash, itching. Tolerating PT/OT. Tolerating diet. Objective:     VITALS:   Last 24hrs VS reviewed since prior progress note. Most recent are:  Patient Vitals for the past 24 hrs:   Temp Pulse Resp BP SpO2   06/07/22 0753 97.9 °F (36.6 °C) (!) 51 16 (!) 94/39 95 %   06/07/22 0400 97.1 °F (36.2 °C) 62 16 112/60 97 %   06/06/22 1950 97 °F (36.1 °C) 63 16 (!) 108/57 96 %       Intake/Output Summary (Last 24 hours) at 6/7/2022 1323  Last data filed at 6/7/2022 0220  Gross per 24 hour   Intake 400 ml   Output --   Net 400 ml        PHYSICAL EXAM:  General: Alert, cooperative, no acute distress    EENT:  EOMI. Anicteric sclerae. MMM  Resp:  CTA bilaterally, no wheezing or rales. No accessory muscle use  CV:  Regular  rhythm,  normal S1/S2, no murmurs rubs gallops, No edema  GI:  Soft, Non distended, Non tender. +Bowel sounds  Neurologic:  Alert, oriented to self and place  Psych:   Fair insight. Not anxious nor agitated  Skin:  No rashes. No jaundice    Reviewed most current lab test results and cultures  YES  Reviewed most current radiology test results   YES  Review and summation of old records today    NO  Reviewed patient's current orders and MAR    YES  PMH/ reviewed - no change compared to H&P  ________________________________________________________________________  Care Plan discussed with:    Comments   Patient x    Family      RN x    Care Manager x    Consultant                        Multidiciplinary team rounds were held today with , nursing, pharmacist and clinical coordinator. Patient's plan of care was discussed; medications were reviewed and discharge planning was addressed.      ________________________________________________________________________  Total NON critical care TIME: 15   Minutes    Total CRITICAL CARE TIME Spent:   Minutes non procedure based      Comments   >50% of visit spent in counseling and coordination of care ________________________________________________________________________  Eloise Bennett MD     Procedures: see electronic medical records for all procedures/Xrays and details which were not copied into this note but were reviewed prior to creation of Plan. LABS:  I reviewed today's most current labs and imaging studies. Pertinent labs include:  No results for input(s): WBC, HGB, HCT, PLT, HGBEXT, HCTEXT, PLTEXT, HGBEXT, HCTEXT, PLTEXT in the last 72 hours. No results for input(s): NA, K, CL, CO2, GLU, BUN, CREA, CA, MG, PHOS, ALB, TBIL, TBILI, ALT, INR, INREXT, INREXT in the last 72 hours.     No lab exists for component: SGOT    Signed: Eloise Bennett MD

## 2022-06-07 NOTE — PROGRESS NOTES
Care plan reviewed, patient progressing towards goals. Problem: Falls - Risk of  Goal: *Absence of Falls  Description: Document Andry Shelly Fall Risk and appropriate interventions in the flowsheet. Outcome: Progressing Towards Goal  Note: Fall Risk Interventions:  Mobility Interventions: Assess mobility with egress test,Mechanical lift,OT consult for ADLs,Patient to call before getting OOB,PT Consult for mobility concerns,Utilize walker, cane, or other assistive device    Mentation Interventions: Door open when patient unattended,More frequent rounding,Reorient patient    Medication Interventions: Patient to call before getting OOB,Assess postural VS orthostatic hypotension,Teach patient to arise slowly    Elimination Interventions: Call light in reach    History of Falls Interventions: Bed/chair exit alarm,Consult care management for discharge planning,Door open when patient unattended,Evaluate medications/consider consulting pharmacy,Investigate reason for fall,Room close to nurse's station         Problem: Patient Education: Go to Patient Education Activity  Goal: Patient/Family Education  Outcome: Progressing Towards Goal     Problem: Pressure Injury - Risk of  Goal: *Prevention of pressure injury  Description: Document Eugene Scale and appropriate interventions in the flowsheet.   Outcome: Progressing Towards Goal  Note: Pressure Injury Interventions:  Sensory Interventions: Assess changes in LOC,Assess need for specialty bed,Check visual cues for pain,Discuss PT/OT consult with provider,Keep linens dry and wrinkle-free,Minimize linen layers    Moisture Interventions: Absorbent underpads,Apply protective barrier, creams and emollients,Check for incontinence Q2 hours and as needed,Minimize layers    Activity Interventions: Assess need for specialty bed,PT/OT evaluation,Pressure redistribution bed/mattress(bed type)    Mobility Interventions: PT/OT evaluation    Nutrition Interventions: Document food/fluid/supplement intake,Offer support with meals,snacks and hydration    Friction and Shear Interventions: Apply protective barrier, creams and emollients,Minimize layers                Problem: Patient Education: Go to Patient Education Activity  Goal: Patient/Family Education  Outcome: Progressing Towards Goal     Problem: Patient Education: Go to Patient Education Activity  Goal: Patient/Family Education  Outcome: Progressing Towards Goal     Problem: Patient Education: Go to Patient Education Activity  Goal: Patient/Family Education  Outcome: Progressing Towards Goal

## 2022-06-07 NOTE — PROGRESS NOTES
AVERY- Awaiting for the free foundation application to be returned to MATTHIAS w/ family's signature. Pt discussed in rounds today. She will need a wheelchair for d/c. AMTTHIAS obtained an order from the attending. PT will document wheelchair specs in a note. MATTHIAS obtained a free foundation application. MATTHIAS called Clark ([phone]), one of the family members for this pt to obtain financial information. He will need to sign the document. Per his request, MATTHIAS emailed him the paperwork. MATTHIAS then called him to tell him that is is there. He said that he will look for it and send it back to this CM.  Hernando Citizen

## 2022-06-07 NOTE — PROGRESS NOTES
0830) Bedside shift change report given to Denton Lara RN (oncoming nurse) by Allen Mendoza RN (offgoing nurse). Report included the following information SBAR, Kardex and MAR.   0900)  [id number]-- Pt stated that she had dizziness all night--felt like the bed was tilting. Discuss giving her meclizine for dizziness. Discuss flexeril and gabapentin for pain. 1030) IDR with Dr. Alfa Ricci (MD), Cash West (pharmacist), , Josey Shine (nurse supervisor),  and Denton Lara (RN) to discuss plan of care including pt dizziness  1230) Discuss with PT, pt orthostatic, report dizziness with stand. [phone]--  Pt would like to talk to someone. Discuss Tylenol for pain. [phone]) Discuss with  if they could visit her tomorrow. 1920) Bedside shift change report given to Cody Read RN (oncoming nurse) by Denton Lara RN (offgoing nurse). Report included the following information SBAR, Kardex and MAR.

## 2022-06-07 NOTE — PROGRESS NOTES
6/7/2022  To Whom It May Concern:   Henna Enriquez is currently a patient at [facility]. She was admitted on 5/17/2022 due to Left-sided weakness [R53.1],    and with DNR,Fall,Skin,Bed Alarm precautions. Due to medical diagnosis and additional complications CVA, dizziness, orthostatic hypotension, weakness has not progressed to a functional level where the patient can safely ambulate using a SPC, rolling walker, crutches, or standard walker. Due to orthostatic hypotension, and weakness she is only able to complete stand pivot transfers to a surface and then must recline. Patient is unable to ambulate due to orthostatic hypotension, symptomatic with increased tone and decreased command following and global weakness secondary to CVA. Patient can not ambulate without maximal assistance x 2 d/t LLE weakness and buckling. Therefore, she is a HIGH fall risk. The patient requires a 17 standard manual; adult wheelchair with a reclining back to control orthostasis due to the inability to sit at a 90 degree angle without light-headedness/dizziness and leg pain; a standard cushion for adequate pressure relief, for patient to safely shift weight, and reposition in sitting 2* patient is at increased risk for skin breakdown; elevating leg-rests to control orthostasis; removable/swing away leg-rests for safety with transfers; and removable arm-rests for safety with transfers. A seat belt and anti-tippers are needed for fall prevention within the home, on access ramp into home, community outings, and appointments.            Patient dimensions are as follows:  Hip to bend of knee: 18\"  Bend of knee to bottom of foot: 15\"  Hip to hip width: 15\"  Shoulder to shoulder width 16\"  Shoulder to elbow: 12\"  Elbow to wrist: 10\"    Thank you from the treating therapist and physician,   Memo Tineo, PT

## 2022-06-08 PROCEDURE — 65270000032 HC RM SEMIPRIVATE

## 2022-06-08 PROCEDURE — 74011000250 HC RX REV CODE- 250: Performed by: STUDENT IN AN ORGANIZED HEALTH CARE EDUCATION/TRAINING PROGRAM

## 2022-06-08 PROCEDURE — 74011250637 HC RX REV CODE- 250/637: Performed by: STUDENT IN AN ORGANIZED HEALTH CARE EDUCATION/TRAINING PROGRAM

## 2022-06-08 RX ADMIN — METOPROLOL SUCCINATE 25 MG: 25 TABLET, EXTENDED RELEASE ORAL at 09:04

## 2022-06-08 RX ADMIN — GABAPENTIN 100 MG: 100 CAPSULE ORAL at 09:04

## 2022-06-08 RX ADMIN — GABAPENTIN 100 MG: 100 CAPSULE ORAL at 18:02

## 2022-06-08 RX ADMIN — CYCLOBENZAPRINE 5 MG: 10 TABLET, FILM COATED ORAL at 22:36

## 2022-06-08 RX ADMIN — MELATONIN TAB 3 MG 3 MG: 3 TAB at 22:36

## 2022-06-08 NOTE — PROGRESS NOTES
Hospitalist Progress Note    NAME: Leilani Ashton   :  1955   MRN:  [id number]   Room Number:  289/00  @ [facility]     Please note that this dictation was completed with Regina Scale, the computer voice recognition software. Quite often unanticipated grammatical, syntax, homophones, and other interpretive errors are inadvertently transcribed by the computer software. Please disregard these errors. Please excuse any errors that have escaped final proofreading. Interim Hospital Summary: 78 y.o. 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 - Speech therapy has evaluated patient and recommended easy to chew, thin liquids  - PT has evaluated patient   -Morphine as needed for pain  -Continue metoprolol  -Lidocaine patch  -Hospice was following  -Meclizine PRN for dizziness          There is no height or weight on file to calculate BMI. Code Status: DNR     Surrogate Decision Maker:     DVT Prophylaxis: Comfort measures   GI Prophylaxis: not indicated                  Subjective:     Chief Complaint / Reason for Physician Visit  No acute issues Discussed with RN events overnight.      Review of Systems:  No fevers, chills, appetite change, cough, sputum production, shortness of breath, dyspnea on exertion, nausea, vomitting, diarrhea, constipation, chest pain, leg edema, abdominal pain, joint pain, rash, itching. Tolerating PT/OT. Tolerating diet. Objective:     VITALS:   Last 24hrs VS reviewed since prior progress note. Most recent are:  Patient Vitals for the past 24 hrs:   Temp Pulse Resp BP SpO2   06/08/22 0846 97.5 °F (36.4 °C) 64 16 121/66 96 %   06/08/22 0259 97.3 °F (36.3 °C) 72 16 121/66 96 %   06/07/22 1934 97.9 °F (36.6 °C) 79 16 133/62 97 %       Intake/Output Summary (Last 24 hours) at 6/8/2022 6485  Last data filed at 6/7/2022 2348  Gross per 24 hour   Intake 680 ml   Output --   Net 680 ml        PHYSICAL EXAM:  General: Sleeping,  no acute distress    EENT:  EOMI. Anicteric sclerae. MMM  Resp:  CTA bilaterally, no wheezing or rales. No accessory muscle use  CV:  Regular  rhythm,  normal S1/S2, no murmurs rubs gallops, No edema  GI:  Soft, Non distended, Non tender. +Bowel sounds  Neurologic:  Alert, oriented to self and place  Psych:   Fair insight. Not anxious nor agitated  Skin:  No rashes. No jaundice    Reviewed most current lab test results and cultures  YES  Reviewed most current radiology test results   YES  Review and summation of old records today    NO  Reviewed patient's current orders and MAR    YES  PMH/ reviewed - no change compared to H&P  ________________________________________________________________________  Care Plan discussed with:    Comments   Patient x    Family      RN x    Care Manager x    Consultant                        Multidiciplinary team rounds were held today with , nursing, pharmacist and clinical coordinator. Patient's plan of care was discussed; medications were reviewed and discharge planning was addressed.      ________________________________________________________________________  Total NON critical care TIME: 15   Minutes    Total CRITICAL CARE TIME Spent:   Minutes non procedure based      Comments   >50% of visit spent in counseling and coordination of care ________________________________________________________________________  Laverne Bartlett MD     Procedures: see electronic medical records for all procedures/Xrays and details which were not copied into this note but were reviewed prior to creation of Plan. LABS:  I reviewed today's most current labs and imaging studies. Pertinent labs include:  No results for input(s): WBC, HGB, HCT, PLT, HGBEXT, HCTEXT, PLTEXT, HGBEXT, HCTEXT, PLTEXT in the last 72 hours. No results for input(s): NA, K, CL, CO2, GLU, BUN, CREA, CA, MG, PHOS, ALB, TBIL, TBILI, ALT, INR, INREXT, INREXT in the last 72 hours.     No lab exists for component: SGOT    Signed: Laverne Bartlett MD

## 2022-06-08 NOTE — PROGRESS NOTES
9109 Patient VS obtained by nursing student. 7057 Patient resting comfortably. Scheduled medications administered and patient assisted in repositioning in bed. Patient has no other needs at this time. 1116 Patient resting. 1320 Patient sleeping    1558 Per nursing student patient not having any pain and is having occasional dizziness sometimes when she moves. Patient water was refilled by nursing student.

## 2022-06-08 NOTE — PROGRESS NOTES
visit for routine follow up. Staff requested  visit. Staff with patient providing daily care at the time of this visit. Asked staff to contact the spiritual care office should patient desire a  visit at a more convenient time. Please contact spiritual care for further referral or consult. Rev.  Guillermina Farah MDiv, [facility], [facility]   paging service: [phone])

## 2022-06-08 NOTE — PROGRESS NOTES
Care plan reviewed, patient progressing towards goals. Problem: Falls - Risk of  Goal: *Absence of Falls  Description: Document Jemima Portillo Fall Risk and appropriate interventions in the flowsheet. Outcome: Progressing Towards Goal  Note: Fall Risk Interventions:  Mobility Interventions: Assess mobility with egress test    Mentation Interventions: Adequate sleep, hydration, pain control,Door open when patient unattended,More frequent rounding,Reorient patient,Toileting rounds    Medication Interventions: Patient to call before getting OOB    Elimination Interventions: Call light in reach    History of Falls Interventions: Bed/chair exit alarm         Problem: Patient Education: Go to Patient Education Activity  Goal: Patient/Family Education  Outcome: Progressing Towards Goal     Problem: Pressure Injury - Risk of  Goal: *Prevention of pressure injury  Description: Document Eugene Scale and appropriate interventions in the flowsheet.   Outcome: Progressing Towards Goal  Note: Pressure Injury Interventions:  Sensory Interventions: Assess changes in LOC,Avoid rigorous massage over bony prominences,Check visual cues for pain    Moisture Interventions: Absorbent underpads,Apply protective barrier, creams and emollients    Activity Interventions: PT/OT evaluation,Increase time out of bed    Mobility Interventions: PT/OT evaluation    Nutrition Interventions: Document food/fluid/supplement intake,Offer support with meals,snacks and hydration    Friction and Shear Interventions: Apply protective barrier, creams and emollients                Problem: Patient Education: Go to Patient Education Activity  Goal: Patient/Family Education  Outcome: Progressing Towards Goal     Problem: Patient Education: Go to Patient Education Activity  Goal: Patient/Family Education  Outcome: Progressing Towards Goal     Problem: Patient Education: Go to Patient Education Activity  Goal: Patient/Family Education  Outcome: Progressing Towards Goal

## 2022-06-08 NOTE — PROGRESS NOTES
Problem: Falls - Risk of  Goal: *Absence of Falls  Description: Document Kole Hunt Fall Risk and appropriate interventions in the flowsheet. Outcome: Progressing Towards Goal  Note: Fall Risk Interventions:  Mobility Interventions: Assess mobility with egress test    Mentation Interventions: Adequate sleep, hydration, pain control,Door open when patient unattended,Reorient patient,Room close to nurse's station    Medication Interventions: Teach patient to arise slowly,Evaluate medications/consider consulting pharmacy    Elimination Interventions: Call light in reach    History of Falls Interventions: Door open when patient unattended,Room close to nurse's station         Problem: Pressure Injury - Risk of  Goal: *Prevention of pressure injury  Description: Document Eugene Scale and appropriate interventions in the flowsheet.   Outcome: Progressing Towards Goal  Note: Pressure Injury Interventions:  Sensory Interventions: Assess changes in LOC,Keep linens dry and wrinkle-free,Minimize linen layers    Moisture Interventions: Absorbent underpads,Apply protective barrier, creams and emollients    Activity Interventions: Increase time out of bed    Mobility Interventions: PT/OT evaluation    Nutrition Interventions: Document food/fluid/supplement intake    Friction and Shear Interventions: Apply protective barrier, creams and emollients,Minimize layers

## 2022-06-09 LAB
ANION GAP SERPL CALC-SCNC: 7 MMOL/L (ref 5–15)
BUN SERPL-MCNC: 12 MG/DL (ref 6–20)
BUN/CREAT SERPL: 17 (ref 12–20)
CALCIUM SERPL-MCNC: 8.8 MG/DL (ref 8.5–10.1)
CHLORIDE SERPL-SCNC: 106 MMOL/L (ref 97–108)
CO2 SERPL-SCNC: 29 MMOL/L (ref 21–32)
CREAT SERPL-MCNC: 0.71 MG/DL (ref 0.55–1.02)
GLUCOSE SERPL-MCNC: 129 MG/DL (ref 65–100)
POTASSIUM SERPL-SCNC: 3.9 MMOL/L (ref 3.5–5.1)
SODIUM SERPL-SCNC: 142 MMOL/L (ref 136–145)

## 2022-06-09 PROCEDURE — 74011250636 HC RX REV CODE- 250/636: Performed by: STUDENT IN AN ORGANIZED HEALTH CARE EDUCATION/TRAINING PROGRAM

## 2022-06-09 PROCEDURE — 65270000032 HC RM SEMIPRIVATE

## 2022-06-09 PROCEDURE — 80048 BASIC METABOLIC PNL TOTAL CA: CPT

## 2022-06-09 PROCEDURE — 97116 GAIT TRAINING THERAPY: CPT | Performed by: PHYSICAL THERAPIST

## 2022-06-09 PROCEDURE — 74011250637 HC RX REV CODE- 250/637: Performed by: STUDENT IN AN ORGANIZED HEALTH CARE EDUCATION/TRAINING PROGRAM

## 2022-06-09 PROCEDURE — 36415 COLL VENOUS BLD VENIPUNCTURE: CPT

## 2022-06-09 PROCEDURE — 97112 NEUROMUSCULAR REEDUCATION: CPT | Performed by: PHYSICAL THERAPIST

## 2022-06-09 RX ORDER — GABAPENTIN 100 MG/1
100 CAPSULE ORAL 3 TIMES DAILY
Status: DISCONTINUED | OUTPATIENT
Start: 2022-06-09 | End: 2022-06-22 | Stop reason: HOSPADM

## 2022-06-09 RX ORDER — AMOXICILLIN 250 MG
1 CAPSULE ORAL DAILY
Status: DISCONTINUED | OUTPATIENT
Start: 2022-06-09 | End: 2022-06-22 | Stop reason: HOSPADM

## 2022-06-09 RX ORDER — ATORVASTATIN CALCIUM 10 MG/1
20 TABLET, FILM COATED ORAL
Status: DISCONTINUED | OUTPATIENT
Start: 2022-06-09 | End: 2022-06-22 | Stop reason: HOSPADM

## 2022-06-09 RX ADMIN — MELATONIN TAB 3 MG 3 MG: 3 TAB at 21:03

## 2022-06-09 RX ADMIN — ATORVASTATIN CALCIUM 20 MG: 10 TABLET, FILM COATED ORAL at 21:03

## 2022-06-09 RX ADMIN — GABAPENTIN 100 MG: 100 CAPSULE ORAL at 09:28

## 2022-06-09 RX ADMIN — GABAPENTIN 100 MG: 100 CAPSULE ORAL at 16:52

## 2022-06-09 RX ADMIN — GABAPENTIN 100 MG: 100 CAPSULE ORAL at 21:04

## 2022-06-09 RX ADMIN — MECLIZINE 25 MG: 12.5 TABLET ORAL at 09:39

## 2022-06-09 RX ADMIN — Medication 1 TABLET: at 16:52

## 2022-06-09 RX ADMIN — ONDANSETRON 4 MG: 4 TABLET, ORALLY DISINTEGRATING ORAL at 09:29

## 2022-06-09 RX ADMIN — ACETAMINOPHEN 650 MG: 325 TABLET ORAL at 09:28

## 2022-06-09 RX ADMIN — CYCLOBENZAPRINE 5 MG: 10 TABLET, FILM COATED ORAL at 21:03

## 2022-06-09 RX ADMIN — POLYETHYLENE GLYCOL (3350) 17 G: 17 POWDER, FOR SOLUTION ORAL at 09:31

## 2022-06-09 RX ADMIN — MORPHINE SULFATE 15 MG: 15 TABLET ORAL at 09:29

## 2022-06-09 NOTE — PROGRESS NOTES
Problem: Falls - Risk of  Goal: *Absence of Falls  Description: Document Santino Betancur Fall Risk and appropriate interventions in the flowsheet. Outcome: Progressing Towards Goal  Note: Fall Risk Interventions:  Mobility Interventions: Assess mobility with egress test,Bed/chair exit alarm,Communicate number of staff needed for ambulation/transfer,OT consult for ADLs,Patient to call before getting OOB,PT Consult for mobility concerns,PT Consult for assist device competence,Strengthening exercises (ROM-active/passive),Utilize walker, cane, or other assistive device    Mentation Interventions: Adequate sleep, hydration, pain control,Bed/chair exit alarm,Door open when patient unattended,Evaluate medications/consider consulting pharmacy,Eyeglasses and hearing aids,Increase mobility,More frequent rounding,Reorient patient,Room close to nurse's station,Toileting rounds,Update white board    Medication Interventions: Assess postural VS orthostatic hypotension,Bed/chair exit alarm,Evaluate medications/consider consulting pharmacy,Patient to call before getting OOB,Teach patient to arise slowly    Elimination Interventions: Bed/chair exit alarm,Call light in reach,Toilet paper/wipes in reach,Toileting schedule/hourly rounds    History of Falls Interventions: Bed/chair exit alarm,Consult care management for discharge planning,Door open when patient unattended,Evaluate medications/consider consulting pharmacy,Investigate reason for fall,Room close to nurse's station,Utilize gait belt for transfer/ambulation         Problem: Patient Education: Go to Patient Education Activity  Goal: Patient/Family Education  Outcome: Progressing Towards Goal     Problem: Pressure Injury - Risk of  Goal: *Prevention of pressure injury  Description: Document Eugene Scale and appropriate interventions in the flowsheet.   Outcome: Progressing Towards Goal  Note: Pressure Injury Interventions:  Sensory Interventions: Assess changes in LOC,Assess need for specialty bed,Avoid rigorous massage over bony prominences,Chair cushion,Check visual cues for pain,Discuss PT/OT consult with provider,Float heels,Keep linens dry and wrinkle-free,Maintain/enhance activity level,Minimize linen layers,Monitor skin under medical devices,Pressure redistribution bed/mattress (bed type),Turn and reposition approx. every two hours (pillows and wedges if needed)    Moisture Interventions: Absorbent underpads,Apply protective barrier, creams and emollients,Assess need for specialty bed,Check for incontinence Q2 hours and as needed,Limit adult briefs,Maintain skin hydration (lotion/cream),Minimize layers,Moisture barrier,Offer toileting Q_hr    Activity Interventions: Assess need for specialty bed,Chair cushion,Increase time out of bed,Pressure redistribution bed/mattress(bed type),PT/OT evaluation    Mobility Interventions: Assess need for specialty bed,Chair cushion,Float heels,HOB 30 degrees or less,Pressure redistribution bed/mattress (bed type),PT/OT evaluation,Turn and reposition approx.  every two hours(pillow and wedges)    Nutrition Interventions: Document food/fluid/supplement intake,Discuss nutritional consult with provider,Offer support with meals,snacks and hydration    Friction and Shear Interventions: Apply protective barrier, creams and emollients,Feet elevated on foot rest,HOB 30 degrees or less,Minimize layers                Problem: Patient Education: Go to Patient Education Activity  Goal: Patient/Family Education  Outcome: Progressing Towards Goal     Problem: Patient Education: Go to Patient Education Activity  Goal: Patient/Family Education  Outcome: Progressing Towards Goal     Problem: Patient Education: Go to Patient Education Activity  Goal: Patient/Family Education  Outcome: Progressing Towards Goal

## 2022-06-09 NOTE — PROGRESS NOTES
2000 Pt resting in bed  Family at bed side. 2100 Pt on bed pan.  2300 medications given  0200 pt sleeping   0400 repositioned the patient  0600 Pt is on bedpan, voided adequate amount with out any difficulty. Cleaned and repositioned the patient.

## 2022-06-09 NOTE — PROGRESS NOTES
AVERY   RUR 11%    Plan   DME   [employer]   Travel arrangements. The family is working getting patient back to her home [place]   Per the note from the nurse patient [employer] has  and needs a note from the doctor patient is in the hospital -not able to come in person to complete the process   And letter to indicate safe to travel. Needs to call the daughter to get more clarity about what is needed. [phone]  The application for wheelchair has been submitted.     [facility] MSW RN   [phone]

## 2022-06-09 NOTE — PROGRESS NOTES
8110- shift report given to keren arita by keren bay. Patient sleeping in bed at this time. 4236- patient willing to use  today. Able to complete a proper assessment. Patient c/o dizziness, HA unable to state score, n&t to right side, intermittent blurred vision none currently, BERNAL, constipation and intermittent nausea. Patient also c/o pain to right side \"very bad but unable to express pain score. 0929-given prn tylenol 650 mg po for HA, morphine 15 mg po for right sided pain, mirlax for constipation and zofran for nausea. 0930- call pharmacy for [facility]- patient working with PT/OT and was able to ambulate in the vargas. Changed linens while patient was standing. 3163- meclizine given for dizziness. Orthostatics negative    1015- Rounds completed with MD zuniga. Notified regarding pain, HA, dizziness and n&t. Increased gabapentin. Discussed constipation and nausea-senna added. Notified regarding patient's c/o sob and difficulty swallowing sob likely d/t to dysphasia. Care manager working on getting patient a passport. 1030- patient resting comfortably in bed. No complaints. 1136- patient sleeping at this time    1244- attempted labs x3 unable to obtained    1335- patient sleeping at this time    1430- patient sleeping     1541- patient sleeping    1737- patient sitting in bed eating. 1824- patient resting comfortably in bed. No complaints at this time.

## 2022-06-09 NOTE — PROGRESS NOTES
Hospitalist Progress Note    NAME: Leilani Ashton   :  1955   MRN:  [id number]   Room Number:  474/15  @ [facility]     Please note that this dictation was completed with Regina Scale, the computer voice recognition software. Quite often unanticipated grammatical, syntax, homophones, and other interpretive errors are inadvertently transcribed by the computer software. Please disregard these errors. Please excuse any errors that have escaped final proofreading. Interim Hospital Summary: 78 y.o. 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 - Speech therapy has evaluated patient and recommended easy to chew, thin liquids  - PT has evaluated patient   -Morphine as needed for pain  -Continue metoprolol  -Lidocaine patch  -Hospice was following  -Meclizine PRN for dizziness          There is no height or weight on file to calculate BMI. Code Status: DNR     Surrogate Decision Maker:     DVT Prophylaxis: Comfort measures   GI Prophylaxis: not indicated                  Subjective:     Chief Complaint / Reason for Physician Visit  No acute issues Discussed with RN events overnight.      Review of Systems:  No fevers, chills, appetite change, cough, sputum production, shortness of breath, dyspnea on exertion, nausea, vomitting, diarrhea, constipation, chest pain, leg edema, abdominal pain, joint pain, rash, itching. Tolerating PT/OT. Tolerating diet. Objective:     VITALS:   Last 24hrs VS reviewed since prior progress note. Most recent are:  Patient Vitals for the past 24 hrs:   Temp Pulse Resp BP SpO2   06/09/22 0751 98.8 °F (37.1 °C) 65 16 (!) 107/47 94 %   06/08/22 1944 97.4 °F (36.3 °C) -- 16 137/75 95 %       Intake/Output Summary (Last 24 hours) at 6/9/2022 0908  Last data filed at 6/8/2022 1227  Gross per 24 hour   Intake 30 ml   Output --   Net 30 ml        PHYSICAL EXAM:  General: Sleeping,  no acute distress    EENT:  EOMI. Anicteric sclerae. MMM  Resp:  CTA bilaterally, no wheezing or rales. No accessory muscle use  CV:  Regular  rhythm,  normal S1/S2, no murmurs rubs gallops, No edema  GI:  Soft, Non distended, Non tender. +Bowel sounds  Neurologic:  Alert, oriented to self and place  Psych:   Fair insight. Not anxious nor agitated  Skin:  No rashes. No jaundice    Reviewed most current lab test results and cultures  YES  Reviewed most current radiology test results   YES  Review and summation of old records today    NO  Reviewed patient's current orders and MAR    YES  PMH/ reviewed - no change compared to H&P  ________________________________________________________________________  Care Plan discussed with:    Comments   Patient x    Family      RN x    Care Manager x    Consultant                        Multidiciplinary team rounds were held today with , nursing, pharmacist and clinical coordinator. Patient's plan of care was discussed; medications were reviewed and discharge planning was addressed.      ________________________________________________________________________  Total NON critical care TIME: 15   Minutes    Total CRITICAL CARE TIME Spent:   Minutes non procedure based      Comments   >50% of visit spent in counseling and coordination of care     ________________________________________________________________________  Baron Kesha MD     Procedures: see electronic medical records for all procedures/Xrays and details which were not copied into this note but were reviewed prior to creation of Plan. LABS:  I reviewed today's most current labs and imaging studies. Pertinent labs include:  No results for input(s): WBC, HGB, HCT, PLT, HGBEXT, HCTEXT, PLTEXT, HGBEXT, HCTEXT, PLTEXT in the last 72 hours. No results for input(s): NA, K, CL, CO2, GLU, BUN, CREA, CA, MG, PHOS, ALB, TBIL, TBILI, ALT, INR, INREXT, INREXT in the last 72 hours.     No lab exists for component: SGOT    Signed: Sandrita Roper MD

## 2022-06-09 NOTE — PROGRESS NOTES
Problem: Mobility Impaired (Adult and Pediatric)  Goal: *Acute Goals and Plan of Care (Insert Text)  Description:   FUNCTIONAL STATUS PRIOR TO ADMISSION: Patient was independent and active without use of DME.    HOME SUPPORT PRIOR TO ADMISSION: The patient lived with family but did not require assist.    Physical Therapy Goals  Initiated 5/20/2022; reviewed 6/2/2022; reviewed 6/9/22 with goals remaining appropriate:     1. Patient will move from supine to sit and sit to supine in bed with minimal assistance/contact guard assist within 7 day(s). 2.  Patient will transfer from bed to chair and chair to bed with moderate assistance  using the least restrictive device within 7 day(s). 3.  Patient will perform sit to stand with minimal assistance/contact guard assist within 7 day(s). 4.  Patient will ambulate with moderate assistance  for 20 feet with the least restrictive device within 7 day(s). 6.  Patient will improve Peralta Balance score by 7 points within 7 days. Outcome: Progressing Towards Goal     PHYSICAL THERAPY TREATMENT  Weekly reassessment  Patient: Leilani Ashton (75 y.o. female)  Date: 6/9/2022  Diagnosis: Left-sided weakness [R53.1] <principal problem not specified>       Precautions: DNR,Fall,Skin,Bed Alarm  Chart, physical therapy assessment, plan of care and goals were reviewed. ASSESSMENT  Patient continues with skilled PT services and is progressing towards goals. Patient demonstrates improved sitting balance this date. Patient stood with minimal to moderate assist. Patient performed 3 gait trials of 15 feet with maximal assist x2 and chair follow. Patient required maximal assist to advance LLE. Patient required maximal manual cues to facilitate LLE and trunk to improve balance with waking. Patient with some complaints of dizziness today but orthostatics negative. PLAN :  Patient continues to benefit from skilled intervention to address the above impairments.   Decreasing PT plan of care to 3 times per week. Recommendation for discharge: (in order for the patient to meet his/her long term goals)  Therapy up to 5 days/week in SNF setting    This discharge recommendation:  Has been made in collaboration with the attending provider and/or case management    IF patient discharges home will need the following DME: wheelchair       SUBJECTIVE:   Patient stated I'm okay.     OBJECTIVE DATA SUMMARY:   Critical Behavior:  Neurologic State: Appropriate for age,Alert,Eyes open spontaneously (c/o dizziness and HA )  Orientation Level: Oriented to time,Oriented to place,Oriented to person,Oriented to situation  Cognition: Appropriate decision making,Appropriate for age attention/concentration,Appropriate safety awareness,Memory loss,Follows commands  Safety/Judgement: Decreased insight into deficits,Decreased awareness of need for safety,Decreased awareness of need for assistance,Fall prevention  Functional Mobility Training:  Bed Mobility:     Supine to Sit: Minimum assistance;Assist x1  Sit to Supine: Minimum assistance; Moderate assistance;Assist x1  Scooting: Minimum assistance;Assist x1        Transfers:  Sit to Stand: Minimum assistance;Assist x2  Stand to Sit: Moderate assistance      Bed to Chair: Moderate assistance;Assist x1    Balance:  Sitting: Impaired  Sitting - Static: Fair (occasional)  Sitting - Dynamic: Fair (occasional)  Standing: Impaired; With support  Standing - Static: Constant support;Poor  Standing - Dynamic : Constant support;Poor    Ambulation/Gait Training:  Distance (ft): 15 Feet (ft) (x3)  Assistive Device: Gait belt  Ambulation - Level of Assistance: Maximum assistance;Assist x2  Gait Abnormalities: Decreased step clearance; Hemiplegic; Altered arm swing;Trunk sway increased; Path deviations  Base of Support: Narrowed  Stance: Left decreased  Speed/Marcelina: Fluctuations  Swing Pattern: Left asymmetrical    Pain Rating:  No pain    Activity Tolerance:   Good    After treatment patient left in no apparent distress:   Supine in bed, Call bell within reach, Bed / chair alarm activated, and Side rails x 3    COMMUNICATION/COLLABORATION:   The patients plan of care was discussed with: Physical therapist, Occupational therapist, and Registered nurse.      Shanelle Mueller, PT   Time Calculation: 40 mins

## 2022-06-10 PROCEDURE — 65270000032 HC RM SEMIPRIVATE

## 2022-06-10 PROCEDURE — 74011250637 HC RX REV CODE- 250/637: Performed by: STUDENT IN AN ORGANIZED HEALTH CARE EDUCATION/TRAINING PROGRAM

## 2022-06-10 RX ADMIN — GABAPENTIN 100 MG: 100 CAPSULE ORAL at 16:49

## 2022-06-10 RX ADMIN — ATORVASTATIN CALCIUM 20 MG: 10 TABLET, FILM COATED ORAL at 21:55

## 2022-06-10 RX ADMIN — GABAPENTIN 100 MG: 100 CAPSULE ORAL at 21:55

## 2022-06-10 RX ADMIN — Medication 1 TABLET: at 08:36

## 2022-06-10 RX ADMIN — GABAPENTIN 100 MG: 100 CAPSULE ORAL at 08:36

## 2022-06-10 NOTE — PROGRESS NOTES
Hospitalist Progress Note    NAME: Daniel Pierre   :  1955   MRN:  [id number]   Room Number:  136/23  @ [facility]     Please note that this dictation was completed with Rere Barajas, the computer voice recognition software. Quite often unanticipated grammatical, syntax, homophones, and other interpretive errors are inadvertently transcribed by the computer software. Please disregard these errors. Please excuse any errors that have escaped final proofreading. Interim Hospital Summary: 78 y.o. 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  Intraparenchymal hemorrhage with edema and mass-effect POA  Mass-effect with 4 mm shift to admit for midline POA  Hypertension  Hyperlipidemia  Comfort measures POA  -Assessed by neurosurgery at outside hospital, no intervention recommended  -Assessed by neurology at outside hospital  -Assessed by palliative care medicine and patient made comfortable per family request in accordance patient wishes           -Haldol as needed for agitation  -Speech therapy has evaluated patient and recommended easy to chew, thin liquids  -PT has evaluated patient   -Morphine as needed for pain  -Lidocaine patch  -Statin          There is no height or weight on file to calculate BMI. Code Status: DNR     Surrogate Decision Maker:     DVT Prophylaxis: Comfort measures   GI Prophylaxis: not indicated                  Subjective:     Chief Complaint / Reason for Physician Visit  No acute issues Discussed with RN events overnight. Review of Systems:  No fevers, chills, appetite change, cough, sputum production, shortness of breath, dyspnea on exertion, nausea, vomitting, diarrhea, constipation, chest pain, leg edema, abdominal pain, joint pain, rash, itching. Tolerating PT/OT. Tolerating diet.        Objective:     VITALS:   Last 24hrs VS reviewed since prior progress note. Most recent are:  Patient Vitals for the past 24 hrs:   Temp Pulse Resp BP SpO2   06/10/22 0723 97.7 °F (36.5 °C) 62 17 127/73 96 %   06/09/22 1939 98 °F (36.7 °C) 73 16 133/79 97 %     No intake or output data in the 24 hours ending 06/10/22 0847     PHYSICAL EXAM:  General: Sleeping,  no acute distress    EENT:  EOMI. Anicteric sclerae. MMM  Resp:  CTA bilaterally, no wheezing or rales. No accessory muscle use  CV:  Regular  rhythm,  normal S1/S2, no murmurs rubs gallops, No edema  GI:  Soft, Non distended, Non tender. +Bowel sounds  Neurologic:  Alert, oriented to self and place  Psych:   Fair insight. Not anxious nor agitated  Skin:  No rashes. No jaundice    Reviewed most current lab test results and cultures  YES  Reviewed most current radiology test results   YES  Review and summation of old records today    NO  Reviewed patient's current orders and MAR    YES  PMH/ reviewed - no change compared to H&P  ________________________________________________________________________  Care Plan discussed with:    Comments   Patient x    Family      RN x    Care Manager x    Consultant                        Multidiciplinary team rounds were held today with , nursing, pharmacist and clinical coordinator. Patient's plan of care was discussed; medications were reviewed and discharge planning was addressed. ________________________________________________________________________  Total NON critical care TIME: 15   Minutes    Total CRITICAL CARE TIME Spent:   Minutes non procedure based      Comments   >50% of visit spent in counseling and coordination of care     ________________________________________________________________________  Brynn Love MD     Procedures: see electronic medical records for all procedures/Xrays and details which were not copied into this note but were reviewed prior to creation of Plan.       LABS:  I reviewed today's most current labs and imaging studies. Pertinent labs include:  No results for input(s): WBC, HGB, HCT, PLT, HGBEXT, HCTEXT, PLTEXT, HGBEXT, HCTEXT, PLTEXT in the last 72 hours.   Recent Labs     06/09/22  1252      K 3.9      CO2 29   *   BUN 12   CREA 0.71   CA 8.8       Signed: Antoinette Hernandez MD

## 2022-06-10 NOTE — PROGRESS NOTES
[address]       6/10/2022      RE: Chadd Macdonald      To Whom it May Concern: This is to certify that Chadd Macdonald was admitted to hospital on 5/17/2022. Patient is stable medically to travel internationally with assisted wheelchair at the airport . This letter may be used for [facility] purposes to bypass in person interview as patient is currently admitted to the hospital    Thank you for your assistance in this matter.     Sincerely,      Mike Abrams MD

## 2022-06-10 NOTE — PROGRESS NOTES
Bedside shift change report given to Keri Manzo (oncoming nurse) by [facility] (offgoing nurse). Report included the following information SBAR, Intake/Output and MAR.     5251 Patient in bed resting quietly, due medication administered. 1030 Patient in bed resting quietly, assisted with incontinent care, positioned and made comfortable. 1215 Patient in bed resting, assisted with positioning for lunch. Denied needs. 1530 Patient rounded on, observed sleeping quietly in bed. Bedside shift change report given to Elena (oncoming nurse) by Keri Manzo (offgoing nurse). Report included the following information SBAR, Intake/Output and MAR.

## 2022-06-10 NOTE — PROGRESS NOTES
Problem: Falls - Risk of  Goal: *Absence of Falls  Description: Document Myranda Barrera Fall Risk and appropriate interventions in the flowsheet.   Outcome: Progressing Towards Goal  Note: Fall Risk Interventions:  Mobility Interventions: Bed/chair exit alarm    Mentation Interventions: Door open when patient unattended    Medication Interventions: Bed/chair exit alarm    Elimination Interventions: Bed/chair exit alarm,Call light in reach    History of Falls Interventions: Bed/chair exit alarm

## 2022-06-11 PROCEDURE — 74011250636 HC RX REV CODE- 250/636: Performed by: STUDENT IN AN ORGANIZED HEALTH CARE EDUCATION/TRAINING PROGRAM

## 2022-06-11 PROCEDURE — 65270000032 HC RM SEMIPRIVATE

## 2022-06-11 PROCEDURE — 74011250637 HC RX REV CODE- 250/637: Performed by: STUDENT IN AN ORGANIZED HEALTH CARE EDUCATION/TRAINING PROGRAM

## 2022-06-11 PROCEDURE — 74011000250 HC RX REV CODE- 250: Performed by: STUDENT IN AN ORGANIZED HEALTH CARE EDUCATION/TRAINING PROGRAM

## 2022-06-11 RX ADMIN — Medication 1 TABLET: at 09:41

## 2022-06-11 RX ADMIN — GABAPENTIN 100 MG: 100 CAPSULE ORAL at 15:24

## 2022-06-11 RX ADMIN — GABAPENTIN 100 MG: 100 CAPSULE ORAL at 21:51

## 2022-06-11 RX ADMIN — MECLIZINE 25 MG: 12.5 TABLET ORAL at 17:52

## 2022-06-11 RX ADMIN — ONDANSETRON 4 MG: 4 TABLET, ORALLY DISINTEGRATING ORAL at 17:52

## 2022-06-11 RX ADMIN — ATORVASTATIN CALCIUM 20 MG: 10 TABLET, FILM COATED ORAL at 21:51

## 2022-06-11 RX ADMIN — GABAPENTIN 100 MG: 100 CAPSULE ORAL at 09:41

## 2022-06-11 NOTE — PROGRESS NOTES
0730- shift change report given to Bernabe Peña, BOB  (oncoming nurse) by Hitesh Owens RN  (offgoing nurse). Report included the following information SBAR, Kardex and MAR.     0900- Patient hard or hearing. Able to communicate in [address] Seems alert and oriented x3. Patient declined AM care, notes did not sleep well last night and feels tired. 1300- Patient assisted to [facility], had a large BM. CHG bath given. Assisted to W/C and patient tolerated being OOB for approx 15 min then requested to go back to bed because felt dizzy. 1500- Patient requesting to call daughter Edith Carpio, called several times and left message. 1800- Patient c/o nausea and dizziness, prn Antivert and Zofran given. 1900- Patient resting w/out complaints at this time     1930- shift change report given to Christi Bloch (oncoming nurse) by Bernabe Peña RN  (offgoing nurse).  Report included the following information SBAR, Kardex and [facility]

## 2022-06-11 NOTE — PROGRESS NOTES
Problem: Falls - Risk of  Goal: *Absence of Falls  Description: Document Tamiko Ewinghammad Fall Risk and appropriate interventions in the flowsheet. Outcome: Progressing Towards Goal  Note: Fall Risk Interventions:  Mobility Interventions: Bed/chair exit alarm,Communicate number of staff needed for ambulation/transfer,Strengthening exercises (ROM-active/passive)    Mentation Interventions: Bed/chair exit alarm    Medication Interventions: Bed/chair exit alarm    Elimination Interventions: Patient to call for help with toileting needs,Call light in reach    History of Falls Interventions: Bed/chair exit alarm,Door open when patient unattended,Room close to nurse's station         Problem: Pressure Injury - Risk of  Goal: *Prevention of pressure injury  Description: Document Eugene Scale and appropriate interventions in the flowsheet.   Outcome: Progressing Towards Goal  Note: Pressure Injury Interventions:  Sensory Interventions: Assess changes in LOC    Moisture Interventions: Apply protective barrier, creams and emollients    Activity Interventions: PT/OT evaluation,Pressure redistribution bed/mattress(bed type)    Mobility Interventions: Assess need for specialty bed,PT/OT evaluation    Nutrition Interventions: Document food/fluid/supplement intake    Friction and Shear Interventions: Apply protective barrier, creams and emollients

## 2022-06-11 NOTE — PROGRESS NOTES
Bedside shift change report given to [facility] E[facility] (oncoming nurse) by Isabell Orellana (offgoing nurse). Report included the following information SBAR, Kardex, Intake/Output, MAR and Recent Results.

## 2022-06-12 PROCEDURE — 65270000032 HC RM SEMIPRIVATE

## 2022-06-12 PROCEDURE — 74011250637 HC RX REV CODE- 250/637: Performed by: STUDENT IN AN ORGANIZED HEALTH CARE EDUCATION/TRAINING PROGRAM

## 2022-06-12 PROCEDURE — 74011000250 HC RX REV CODE- 250: Performed by: STUDENT IN AN ORGANIZED HEALTH CARE EDUCATION/TRAINING PROGRAM

## 2022-06-12 RX ORDER — NYSTATIN 100000 [USP'U]/G
POWDER TOPICAL 2 TIMES DAILY
Status: DISCONTINUED | OUTPATIENT
Start: 2022-06-13 | End: 2022-06-22 | Stop reason: HOSPADM

## 2022-06-12 RX ADMIN — GABAPENTIN 100 MG: 100 CAPSULE ORAL at 08:44

## 2022-06-12 RX ADMIN — ATORVASTATIN CALCIUM 20 MG: 10 TABLET, FILM COATED ORAL at 21:48

## 2022-06-12 RX ADMIN — GABAPENTIN 100 MG: 100 CAPSULE ORAL at 16:21

## 2022-06-12 RX ADMIN — Medication 1 TABLET: at 08:44

## 2022-06-12 RX ADMIN — GABAPENTIN 100 MG: 100 CAPSULE ORAL at 21:48

## 2022-06-12 RX ADMIN — ACETAMINOPHEN 650 MG: 325 TABLET ORAL at 23:53

## 2022-06-12 RX ADMIN — ACETAMINOPHEN 650 MG: 325 TABLET ORAL at 11:49

## 2022-06-12 NOTE — PROGRESS NOTES
Bedside shift change report given to [facility] DAVID (oncoming nurse) by Margarito Preston (offgoing nurse). Report included the following information SBAR, Kardex, Intake/Output, MAR and Recent Results.

## 2022-06-12 NOTE — PROGRESS NOTES
Hospitalist Progress Note    NAME: Gladis Leone   :  1955   MRN:  [id number]   Room Number:  281/48  @ [facility]     Please note that this dictation was completed with Odessa Mckinney, the computer voice recognition software. Quite often unanticipated grammatical, syntax, homophones, and other interpretive errors are inadvertently transcribed by the computer software. Please disregard these errors. Please excuse any errors that have escaped final proofreading. Interim Hospital Summary: 78 y.o. 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 - Speech therapy has evaluated patient and recommended easy to chew, thin liquids  - PT has evaluated patient   -Morphine as needed for pain  -Continue metoprolol  -Lidocaine patch  -Hospice was following  - Meclizine PRN for dizziness  - trying to reach family to revisit goals of care         There is no height or weight on file to calculate BMI. Code Status: DNR     Surrogate Decision Maker:     DVT Prophylaxis: Comfort measures   GI Prophylaxis: not indicated                  Subjective:     Chief Complaint / Reason for Physician Visit  No acute issues Discussed with RN events overnight.      Review of Systems:  No fevers, chills, appetite change, cough, sputum production, shortness of breath, dyspnea on exertion, nausea, vomitting, diarrhea, constipation, chest pain, leg edema, abdominal pain, joint pain, rash, itching. Tolerating PT/OT. Tolerating diet. Objective:     VITALS:   Last 24hrs VS reviewed since prior progress note. Most recent are:  Patient Vitals for the past 24 hrs:   Temp Pulse Resp BP SpO2   06/12/22 0842 98 °F (36.7 °C) 80 16 (!) 121/92 96 %   06/11/22 1939 98.6 °F (37 °C) 75 18 129/72 93 %       Intake/Output Summary (Last 24 hours) at 6/12/2022 1052  Last data filed at 6/11/2022 2254  Gross per 24 hour   Intake 120 ml   Output 300 ml   Net -180 ml        PHYSICAL EXAM:  General: Alert, cooperative, no acute distress    EENT:  EOMI. Anicteric sclerae. MMM  Resp:  CTA bilaterally, no wheezing or rales. No accessory muscle use  CV:  Regular  rhythm,  normal S1/S2, no murmurs rubs gallops, No edema  GI:  Soft, Non distended, Non tender. +Bowel sounds  Neurologic:  Alert, oriented to self and place  Psych:   Fair insight. Not anxious nor agitated  Skin:  No rashes. No jaundice    Reviewed most current lab test results and cultures  YES  Reviewed most current radiology test results   YES  Review and summation of old records today    NO  Reviewed patient's current orders and MAR    YES  PMH/ reviewed - no change compared to H&P  ________________________________________________________________________  Care Plan discussed with:    Comments   Patient x    Family      RN x    Care Manager x    Consultant                        Multidiciplinary team rounds were held today with , nursing, pharmacist and clinical coordinator. Patient's plan of care was discussed; medications were reviewed and discharge planning was addressed.      ________________________________________________________________________  Total NON critical care TIME: 15   Minutes    Total CRITICAL CARE TIME Spent:   Minutes non procedure based      Comments   >50% of visit spent in counseling and coordination of care ________________________________________________________________________  Kajal Montgomery MD     Procedures: see electronic medical records for all procedures/Xrays and details which were not copied into this note but were reviewed prior to creation of Plan. LABS:  I reviewed today's most current labs and imaging studies. Pertinent labs include:  No results for input(s): WBC, HGB, HCT, PLT, HGBEXT, HCTEXT, PLTEXT, HGBEXT, HCTEXT, PLTEXT in the last 72 hours.   Recent Labs     06/09/22  1252      K 3.9      CO2 29   *   BUN 12   CREA 0.71   CA 8.8       Signed: Kajal Montgomery MD

## 2022-06-12 NOTE — PROGRESS NOTES
2416) Bedside shift change report given to Reynaldo Alvarado, RN (oncoming nurse) by Marybeth Oglesby, RN (offgoing nurse). Report included the following information SBAR, Kardex and MAR   0830) Pt attempt to stand from bed by self. Bed alarm activated. Pt pivot assist to bedside commode for bowel movement. 200) Pt daughter at bedside. Interpretter [id number]-- discuss tynelol for L leg numbness pain. Pt denied any dizziness. Discuss dentures not found. 1400) pt excited to be able to lift her L leg. Pt worried about earning money and her ability to work. Pt tearful--discussing death of her parents. 18) Perfectserve Dr. Arabella Guzman you order Nstatin for groin--red, irritated skin? Thanks  1940) Bedside shift change report given to Marybeth Oglesby RN (oncoming nurse) by Reynaldo Alvarado RN (offgoing nurse). Report included the following information SBAR, Kardex and MAR.

## 2022-06-13 PROCEDURE — 74011250637 HC RX REV CODE- 250/637: Performed by: STUDENT IN AN ORGANIZED HEALTH CARE EDUCATION/TRAINING PROGRAM

## 2022-06-13 PROCEDURE — 97116 GAIT TRAINING THERAPY: CPT | Performed by: PHYSICAL THERAPIST

## 2022-06-13 PROCEDURE — 74011000250 HC RX REV CODE- 250: Performed by: STUDENT IN AN ORGANIZED HEALTH CARE EDUCATION/TRAINING PROGRAM

## 2022-06-13 PROCEDURE — 65270000032 HC RM SEMIPRIVATE

## 2022-06-13 PROCEDURE — 97112 NEUROMUSCULAR REEDUCATION: CPT | Performed by: PHYSICAL THERAPIST

## 2022-06-13 PROCEDURE — 97112 NEUROMUSCULAR REEDUCATION: CPT | Performed by: OCCUPATIONAL THERAPIST

## 2022-06-13 PROCEDURE — 97535 SELF CARE MNGMENT TRAINING: CPT | Performed by: OCCUPATIONAL THERAPIST

## 2022-06-13 RX ADMIN — GABAPENTIN 100 MG: 100 CAPSULE ORAL at 16:03

## 2022-06-13 RX ADMIN — NYSTATIN: 100000 POWDER TOPICAL at 10:37

## 2022-06-13 RX ADMIN — Medication 1 TABLET: at 08:18

## 2022-06-13 RX ADMIN — NYSTATIN: 100000 POWDER TOPICAL at 18:38

## 2022-06-13 RX ADMIN — GABAPENTIN 100 MG: 100 CAPSULE ORAL at 22:16

## 2022-06-13 RX ADMIN — ATORVASTATIN CALCIUM 20 MG: 10 TABLET, FILM COATED ORAL at 22:16

## 2022-06-13 RX ADMIN — GABAPENTIN 100 MG: 100 CAPSULE ORAL at 08:18

## 2022-06-13 NOTE — PROGRESS NOTES
Problem: Mobility Impaired (Adult and Pediatric)  Goal: *Acute Goals and Plan of Care (Insert Text)  Description:   FUNCTIONAL STATUS PRIOR TO ADMISSION: Patient was independent and active without use of DME.    HOME SUPPORT PRIOR TO ADMISSION: The patient lived with family but did not require assist.    Physical Therapy Goals  Initiated 5/20/2022; reviewed 6/2/2022; reviewed 6/9/22 with goals remaining appropriate:     1. Patient will move from supine to sit and sit to supine in bed with minimal assistance/contact guard assist within 7 day(s). 2.  Patient will transfer from bed to chair and chair to bed with moderate assistance  using the least restrictive device within 7 day(s). 3.  Patient will perform sit to stand with minimal assistance/contact guard assist within 7 day(s). 4.  Patient will ambulate with moderate assistance  for 20 feet with the least restrictive device within 7 day(s). 6.  Patient will improve Peralta Balance score by 7 points within 7 days. Outcome: Progressing Towards Goal     PHYSICAL THERAPY TREATMENT  Patient: Chadd Macdonald (10 y.o. female)  Date: 6/13/2022  Diagnosis: Left-sided weakness [R53.1] <principal problem not specified>       Precautions: DNR,Fall,Skin,Bed Alarm  Chart, physical therapy assessment, plan of care and goals were reviewed. ASSESSMENT  Patient continues with skilled PT services and is progressing towards goals. Patient demonstrates improved static sitting balance. Patient with good participation this date. Patient required maximal assistance for transfers from chair to bed to commode. Patient performed two gait trials of 10 feet with maximal assist x2. Patient provided with maximal manual cues to facilitate LLE and trunk to improve balance during transfers, standing, and ambulation. Current Level of Function Impacting Discharge (mobility/balance):  Max A    Other factors to consider for discharge: LUE/LLE weakness, cognition         PLAN :  Patient continues to benefit from skilled intervention to address the above impairments. Continue treatment per established plan of care. to address goals. Recommendation for discharge: (in order for the patient to meet his/her long term goals)  To be determined: plan is for patient to retuern home with 24/7 assist from family    This discharge recommendation:  Has been made in collaboration with the attending provider and/or case management    IF patient discharges home will need the following DME: wheelchair       SUBJECTIVE:   Patient stated Dayana tillman.  [employer]  utilized throughout session. OBJECTIVE DATA SUMMARY:   Critical Behavior:  Neurologic State: Alert  Orientation Level: Oriented to person,Oriented to place,Disoriented to situation,Disoriented to time  Cognition: Decreased attention/concentration,Decreased command following,Impulsive,Poor safety awareness  Safety/Judgement: Awareness of environment,Decreased awareness of need for assistance,Decreased awareness of need for safety,Decreased insight into deficits,Fall prevention  Functional Mobility Training:  Co-tx with OT as pt required the knowledge and skills of 2 therapists for LUE and LE muscle facilitation and inhibition, cognitive instruction, attention to task, maximizing function and improving safety, midline awareness and balance. Bed Mobility:     Supine to Sit: Additional time;Minimum assistance  Sit to Supine: Minimum assistance; Additional time;Assist x1  Scooting: Moderate assistance; Additional time;Assist x1        Transfers:  Sit to Stand: Maximum assistance; Additional time;Assist x1 (for safety)  Stand to Sit: Moderate assistance; Additional time;Assist x1        Bed to Chair: Moderate assistance; Additional time;Assist x2 (amb with max manual cues for LUE/LE, balance)    Balance:  Sitting: Impaired; With support  Sitting - Static: Fair (occasional)  Sitting - Dynamic: Fair (occasional)  Standing: Impaired;Pull to stand; With support  Standing - Static: Poor;Constant support  Standing - Dynamic : Poor;Constant support    Ambulation/Gait Training:  Distance (ft): 10 Feet (ft) (X2)  Assistive Device: Gait belt  Ambulation - Level of Assistance: Maximum assistance;Assist x2  Gait Abnormalities: Decreased step clearance; Altered arm swing;Hemiplegic; Path deviations  Base of Support: Narrowed  Stance: Left decreased  Speed/Marcelina: Fluctuations  Swing Pattern: Left asymmetrical      Neuromuscular Reeducation:   -Maximal manual cues to facilitate LLE and trunk to improve balance during standing, transfers, and ambulation    Pain Rating:  No pain    Activity Tolerance:   Good    After treatment patient left in no apparent distress:   Supine in bed, Call bell within reach, Bed / chair alarm activated, and Side rails x 3    COMMUNICATION/COLLABORATION:   The patients plan of care was discussed with: Occupational therapist and Registered nurse.      Marli Darden, PT   Time Calculation: 36 mins

## 2022-06-13 NOTE — PROGRESS NOTES
Hospitalist Progress Note    NAME: Kandace Muhammad   :  1955   MRN:  [id number]   Room Number:  878/55  @ [facility]     Please note that this dictation was completed with Boni Mosher, the computer voice recognition software. Quite often unanticipated grammatical, syntax, homophones, and other interpretive errors are inadvertently transcribed by the computer software. Please disregard these errors. Please excuse any errors that have escaped final proofreading. Interim Hospital Summary: 78 y.o. 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 - Speech therapy has evaluated patient and recommended easy to chew, thin liquids  - PT has evaluated patient   -Morphine as needed for pain  -Continue metoprolol  -Lidocaine patch  -Hospice was following  - Meclizine PRN for dizziness  - trying to reach family to revisit goals of care         There is no height or weight on file to calculate BMI. Code Status: DNR     Surrogate Decision Maker:     DVT Prophylaxis: Comfort measures   GI Prophylaxis: not indicated                  Subjective:     Chief Complaint / Reason for Physician Visit  No acute issues Discussed with RN events overnight.      Review of Systems:  No fevers, chills, appetite change, cough, sputum production, shortness of breath, dyspnea on exertion, nausea, vomitting, diarrhea, constipation, chest pain, leg edema, abdominal pain, joint pain, rash, itching. Tolerating PT/OT. Tolerating diet. Objective:     VITALS:   Last 24hrs VS reviewed since prior progress note. Most recent are:  Patient Vitals for the past 24 hrs:   Temp Pulse Resp BP SpO2   06/13/22 0726 98 °F (36.7 °C) 69 16 (!) 121/59 --   06/12/22 1959 98.6 °F (37 °C) 77 16 125/60 99 %       Intake/Output Summary (Last 24 hours) at 6/13/2022 0916  Last data filed at 6/12/2022 2354  Gross per 24 hour   Intake 180 ml   Output --   Net 180 ml        PHYSICAL EXAM:  General: Alert, cooperative, no acute distress    EENT:  EOMI. Anicteric sclerae. MMM  Resp:  CTA bilaterally, no wheezing or rales. No accessory muscle use  CV:  Regular  rhythm,  normal S1/S2, no murmurs rubs gallops, No edema  GI:  Soft, Non distended, Non tender. +Bowel sounds  Neurologic:  Alert, oriented to self and place  Psych:   Fair insight. Not anxious nor agitated  Skin:  No rashes. No jaundice    Reviewed most current lab test results and cultures  YES  Reviewed most current radiology test results   YES  Review and summation of old records today    NO  Reviewed patient's current orders and MAR    YES  PMH/SH reviewed - no change compared to H&P  ________________________________________________________________________  Care Plan discussed with:    Comments   Patient x    Family      RN x    Care Manager x    Consultant                        Multidiciplinary team rounds were held today with , nursing, pharmacist and clinical coordinator. Patient's plan of care was discussed; medications were reviewed and discharge planning was addressed.      ________________________________________________________________________  Total NON critical care TIME: 15   Minutes    Total CRITICAL CARE TIME Spent:   Minutes non procedure based      Comments   >50% of visit spent in counseling and coordination of care ________________________________________________________________________  Leonardo Peña MD     Procedures: see electronic medical records for all procedures/Xrays and details which were not copied into this note but were reviewed prior to creation of Plan. LABS:  I reviewed today's most current labs and imaging studies. Pertinent labs include:  No results for input(s): WBC, HGB, HCT, PLT, HGBEXT, HCTEXT, PLTEXT, HGBEXT, HCTEXT, PLTEXT in the last 72 hours. No results for input(s): NA, K, CL, CO2, GLU, BUN, CREA, CA, MG, PHOS, ALB, TBIL, TBILI, ALT, INR, INREXT, INREXT in the last 72 hours.     No lab exists for component: SGOT    Signed: Leonardo Peña MD

## 2022-06-13 NOTE — PROGRESS NOTES
Problem: Self Care Deficits Care Plan (Adult)  Goal: *Acute Goals and Plan of Care (Insert Text)  Description: FUNCTIONAL STATUS PRIOR TO ADMISSION: Patient was independent and active without use of DME.    HOME SUPPORT: The patient lived with family but did not require assist.    Occupational Therapy Goals  Initiated 5/23/2022, Weekly re-evaluation 5/31/2022, 6/13/22-continue all goals  1. Patient will perform seated grooming with minimal assistance  within 7 day(s). 2.  Patient will perform upper body dressing with moderate assistance  within 7 day(s). 3.  Patient will perform lower body dressing with moderate assistance within 7 day(s). 4.  Patient will perform toilet transfers to [facility] with moderate assistance within 7 day(s). 5.  Patient will perform all aspects of toileting with moderate assistance  within 7 day(s). 6.  Patient will participate in upper extremity therapeutic exercise/activities with maximal assistance within 7 day(s). 7.  Patient will utilize energy conservation and fall prevention techniques during functional activities with verbal cues within 7 day(s). 8.  Patient will improve their Fugl Harris score by 5 points in prep for ADLs within 7 days. MET 6/13/22 and continue to increase by 5 points. Outcome: Progressing Towards Goal     OCCUPATIONAL THERAPY TREATMENT/WEEKLY RE-EVALUATION  Patient: Donald Vyas (5 y.o. female)  Date: 6/13/2022  Diagnosis: Left-sided weakness [R53.1] <principal problem not specified>       Precautions: DNR,Fall,Skin,Bed Alarm  Chart, occupational therapy assessment, plan of care, and goals were reviewed. ASSESSMENT  Based on the objective data described below, pt is making slow progress towards goals. She demonstrated improved ADL performance bed level and functional mobility with amb and BSC transfers. Stratus  utilized entire session with challenges due to pt's hearing impairments.   Continue to recommend 24/7 assist for safety at discharge. Current Level of Function Impacting Discharge (ADLs): max A ADLs, max A functional mobility    Other factors to consider for discharge: see above         PLAN :  Goals have been updated based on progression since last assessment. Patient continues to benefit from skilled intervention to address the above impairments. Continue to follow patient 2 times a week to address goals. Recommend with staff: up to [facility] with 2 person assist for toileting    Recommend next OT session: LUE neuro re-education, ADLs    Recommendation for discharge: (in order for the patient to meet his/her long term goals)  To be determined: plan is for pt to return home with 24/7 assist from family    This discharge recommendation:  Has been made in collaboration with the attending provider and/or case management    Equipment recommendations for successful discharge (if) home: bedside commode, gait belt, and wheelchair with seatbelt       SUBJECTIVE:   Patient stated I am better today.     OBJECTIVE DATA SUMMARY:   Cognitive/Behavioral Status:  Neurologic State: Alert  Orientation Level: Oriented to person;Oriented to place; Disoriented to situation;Disoriented to time  Cognition: Decreased attention/concentration;Decreased command following; Impulsive;Poor safety awareness  Perception: Cues to attend to left side of body;Cues to maintain midline in sitting;Cues to maintain midline in standing; Tactile;Verbal;Visual  Perseveration: No perseveration noted  Safety/Judgement: Awareness of environment;Decreased awareness of need for assistance;Decreased awareness of need for safety;Decreased insight into deficits; Fall prevention    Neuro Re-Education and Functional Mobility and Transfers for ADLs: Co-tx with PT as pt required the knowledge and skills of 2 therapists for LUE and LE muscle facilitation and inhibition, cognitive instruction, attention to task, maximizing function and improving safety, midline awareness and balance.     Bed Mobility:  Supine to Sit: Additional time;Minimum assistance  Sit to Supine: Minimum assistance; Additional time;Assist x1  Scooting: Moderate assistance; Additional time;Assist x1    Transfers:  Sit to Stand: Maximum assistance; Additional time;Assist x1 (for safety)  Functional Transfers  Toilet Transfer : Maximum assistance; Additional time;Assist x1  Cues: Physical assistance; Tactile cues provided;Verbal cues provided;Visual cues provided  Bed to Chair: Moderate assistance; Additional time;Assist x2 (amb with max manual cues for LUE/LE, balance)    Fugl-Harris Assessment of Motor Recovery after Stroke: LUE    Reflex Activity  Flexors/Biceps/Fingers: Can be elicited  Extensors/Triceps: Can be elicited  Reflex Subtotal: 4    Volitional Movement Within Synergies  Shoulder Retraction: Full  Shoulder Elevation: Full  Shoulder Abduction (90 degrees): Partial  Shoulder External Rotation: Partial  Elbow Flexion: Partial  Forearm Supination: Partial  Shoulder Adduction/Internal Rotation: Full  Elbow Extension: Full  Forearm Pronation: Full  Subtotal: 14    Volitional Movement Mixing Synergies  Hand to Lumbar Spine: Partial  Shoulder Flexion (0-90 degrees): Partial  Pronation-Supination: Partial  Subtotal: 3    Volitional Movement With Little or No Synergy  Shoulder Abduction (0-90 degrees): Partial  Shoulder Flexion ( degrees): None  Pronation/Supination: Partial  Subtotal : 2    Normal Reflex Activity  Biceps, Triceps, Finger Flexors:  Full  Subtotal : 2    Upper Extremity Total   Upper Extremity Total: 25    Wrist  Stability at 15 Degree Dorsiflexion: Partial  Repeated Dorsiflexion/ Volar Flexion: Partial  Stability at 15 Degree Dorsiflexion: Partial  Repeated Dorsiflexion/ Volar Flexion: Partial  Circumduction: Partial  Wrist Total: 5    Hand  Mass Flexion: Full  Mass Extension: Full  Grasp A: Partial  Grasp B: Partial  Grasp C: Partial  Grasp D: Partial  Grasp E: Partial  Hand Total: 9    Coordination/Speed  Tremor: Marked  Dysmetria: Marked  Time: >5s (pt able to reach nose, but not follow commands to complete)  Coordination/Speed Total : 0    Total A-D  Total A-D (Motor Function): 39/66         This is a reliable/valid measure of arm function after a neurological event. It has established value to characterize functional status and for measuring spontaneous and therapy-induced recovery; tests proximal and distal motor functions. Fugl-Harris Assessment - UE scores recorded between five and 30 days post neurologic event can be used to predict UE recovery at six months post neurologic event. Severe = 0-21 points   Moderately Severe = 22-33 points   Moderate = 34-47 points   Mild = 48-66 points  MARIA T Patel, SHAILESH Toribio, & ROCKY Clay (1992). Measurement of motor recovery after stroke: Outcome assessment and sample size requirements. Stroke, 23, pp. 1734-9765.   --------------------------------------------------------------------------------------------------------------------------------------------------------------------  MCID:  Stroke:   Claire Modi et al, 2001; n = 171; mean age 79 (5) years; assessed within 16 (12) days of stroke, Acute Stroke)  FMA Motor Scores from Admission to Discharge   10 point increase in FMA Upper Extremity = 1.5 change in discharge FIM   10 point increase in FMA Lower Extremity = 1.9 change in discharge FIM  MDC:   Stroke:   Estella Cross et al, 2008, n = 14, mean age = 59.9 (14.6) years, assessed on average 14 (6.5) months post stroke, Chronic Stroke)   FMA = 5.2 points for the Upper Extremity portion of the assessment        Balance:  Sitting: Impaired; With support  Sitting - Static: Fair (occasional)  Sitting - Dynamic: Fair (occasional)  Standing: Impaired;Pull to stand; With support  Standing - Static: Poor;Constant support  Standing - Dynamic : Poor;Constant support    ADL Intervention:  Lower Body Dressing Assistance  Socks: Minimum assistance;Training to use affected extremity as a gross motor assistance (despite max instruction, unable to use LUE to assist)  Leg Crossed Method Used: Yes  Position Performed: Long sitting on bed  Cues: Don;Physical assistance; Tactile cues provided;Verbal cues provided;Visual cues provided    Toileting  Toileting Assistance: Maximum assistance  Bladder Hygiene: Moderate assistance  Bowel Hygiene: Total assistance (dependent)  Clothing Management: Total assistance (dependent)  Cues: Tactile cues provided;Verbal cues provided;Visual cues provided  Adaptive Equipment: Walker    Cognitive Retraining  Problem Solving: Identifying the task; Identifying the problem  Executive Functions: Executing cognitive plans  Organizing/Sequencing: Breaking task down  Attention to Task: Single task  Maintains Attention For (Time):  (5 seconds)  Following Commands: Awareness of environment; Follows one step commands/directions  Safety/Judgement: Awareness of environment;Decreased awareness of need for assistance;Decreased awareness of need for safety;Decreased insight into deficits; Fall prevention  Cues: Tactile cues provided;Verbal cues provided;Visual cues provided    Pain:  No c/o pain    Activity Tolerance:   Fair and requires frequent rest breaks    After treatment patient left in no apparent distress:   Supine in bed, Call bell within reach, Bed / chair alarm activated, and bed in chair position    COMMUNICATION/COLLABORATION:   The patients plan of care was discussed with: Physical Therapist and Registered Nurse    Jagdish Barrera OT  Time Calculation: 41 mins

## 2022-06-13 NOTE — PROGRESS NOTES
Bedside shift change report given to BOB Crouch/ASHLEY Castaneda (oncoming nurse) by Davie Fisher (offgoing nurse). Report included the following information SBAR, Kardex, Intake/Output, MAR and Recent Results.

## 2022-06-13 NOTE — PROGRESS NOTES
1920) Bedside and Verbal shift change report given to BOB Pleitez (oncoming nurse) by BOB Crouch (offgoing nurse). Report included the following information SBAR, Kardex, Intake/Output and MAR.      1955)  PM assessment done. .Patient is AOx4. VS taken. Patient stable at this time. Patient is in bed resting comfortably. Patient denies any pain at this time.  No distress noted.     2216) Bedtime medication taken whole with 60 ml of water. Scheduled Lidocaine 4 % patch applied to the back.      0015) Patient was rounded on. Patient is sleeping comfortably in bed.     0417) Patient was rounded on. Patient is sleeping comfortably in bed.     0550) Patient was assisted to use the bedpan. Patient was incontinent of bowel with a smear of stool. 0712) Bedside and Verbal shift change report given to BOB Crouch (oncoming nurse) by Bola Coats RN (offgoing nurse). Report included the following information SBAR, Kardex, Intake/Output, MAR,and Recent Results.

## 2022-06-13 NOTE — PROGRESS NOTES
Wound Care: Weekly Skin Assessment    Sacrum/Coccyx and heels intact w/out Erythema. No skin breakdown noted. Patient on Pro+Surface CARRIE mattress. Eugene Score = 15    Makes slight changes in position frequently.  Patient requests bedpan, continent of B/B.

## 2022-06-13 NOTE — PROGRESS NOTES
Problem: Falls - Risk of  Goal: *Absence of Falls  Description: Document Kole Hunt Fall Risk and appropriate interventions in the flowsheet. Outcome: Progressing Towards Goal  Note: Fall Risk Interventions:  Mobility Interventions: Bed/chair exit alarm    Mentation Interventions: Adequate sleep, hydration, pain control    Medication Interventions: Bed/chair exit alarm    Elimination Interventions: Call light in reach,Bed/chair exit alarm    History of Falls Interventions: Bed/chair exit alarm,Door open when patient unattended,Room close to nurse's station         Problem: Pressure Injury - Risk of  Goal: *Prevention of pressure injury  Description: Document Eugene Scale and appropriate interventions in the flowsheet.   Outcome: Progressing Towards Goal  Note: Pressure Injury Interventions:  Sensory Interventions: Assess changes in LOC,Minimize linen layers,Maintain/enhance activity level,Keep linens dry and wrinkle-free    Moisture Interventions: Absorbent underpads,Apply protective barrier, creams and emollients    Activity Interventions: Increase time out of bed    Mobility Interventions: Pressure redistribution bed/mattress (bed type)    Nutrition Interventions: Document food/fluid/supplement intake    Friction and Shear Interventions: Minimize layers                Problem: Patient Education: Go to Patient Education Activity  Goal: Patient/Family Education  Outcome: Progressing Towards Goal

## 2022-06-14 PROCEDURE — 74011250637 HC RX REV CODE- 250/637: Performed by: STUDENT IN AN ORGANIZED HEALTH CARE EDUCATION/TRAINING PROGRAM

## 2022-06-14 PROCEDURE — 74011000250 HC RX REV CODE- 250: Performed by: STUDENT IN AN ORGANIZED HEALTH CARE EDUCATION/TRAINING PROGRAM

## 2022-06-14 PROCEDURE — 65270000032 HC RM SEMIPRIVATE

## 2022-06-14 RX ADMIN — GABAPENTIN 100 MG: 100 CAPSULE ORAL at 22:01

## 2022-06-14 RX ADMIN — NYSTATIN: 100000 POWDER TOPICAL at 18:06

## 2022-06-14 RX ADMIN — ATORVASTATIN CALCIUM 20 MG: 10 TABLET, FILM COATED ORAL at 22:01

## 2022-06-14 RX ADMIN — NYSTATIN: 100000 POWDER TOPICAL at 08:28

## 2022-06-14 RX ADMIN — GABAPENTIN 100 MG: 100 CAPSULE ORAL at 08:20

## 2022-06-14 RX ADMIN — GABAPENTIN 100 MG: 100 CAPSULE ORAL at 17:43

## 2022-06-14 NOTE — PROGRESS NOTES
2383 Bedside shift change report given to Coy Davis RN (oncoming nurse) by Junito Shelton RN (offgoing nurse). Report included the following information SBAR, Kardex, Intake/Output, MAR and Recent Results. 0820 Patient sleeping but easily aroused. Patient assessment completed and scheduled medications administered. Patient water refilled. Patient has no other needs at this time. 3081 Patient resting. 1047 Patient sleeping. 1118 Patient sitting in bed combing hair. Patient has no needs at this time. 1200 Patient resting looking out the window. Patient has no needs at this. 1400 Patient sleeping.

## 2022-06-14 NOTE — PROGRESS NOTES
Problem: Falls - Risk of  Goal: *Absence of Falls  Description: Document [place] Fall Risk and appropriate interventions in the flowsheet. Outcome: Progressing Towards Goal  Note: Fall Risk Interventions:  Mobility Interventions: Bed/chair exit alarm    Mentation Interventions: Adequate sleep, hydration, pain control,Bed/chair exit alarm    Medication Interventions: Bed/chair exit alarm    Elimination Interventions: Call light in reach,Toileting schedule/hourly rounds    History of Falls Interventions: Bed/chair exit alarm,Door open when patient unattended,Room close to nurse's station         Problem: Pressure Injury - Risk of  Goal: *Prevention of pressure injury  Description: Document Eugene Scale and appropriate interventions in the flowsheet.   Outcome: Progressing Towards Goal  Note: Pressure Injury Interventions:  Sensory Interventions: Assess changes in LOC,Keep linens dry and wrinkle-free,Minimize linen layers    Moisture Interventions: Absorbent underpads,Apply protective barrier, creams and emollients,Minimize layers    Activity Interventions: PT/OT evaluation,Increase time out of bed    Mobility Interventions: Pressure redistribution bed/mattress (bed type)    Nutrition Interventions: Document food/fluid/supplement intake    Friction and Shear Interventions: Minimize layers                Problem: Patient Education: Go to Patient Education Activity  Goal: Patient/Family Education  Outcome: Progressing Towards Goal

## 2022-06-14 NOTE — PROGRESS NOTES
Hospitalist Progress Note    NAME: Celine Ovalles   :  1955   MRN:  [id number]   Room Number:  05917  @ [facility]     Please note that this dictation was completed with [employer], the computer voice recognition software. Quite often unanticipated grammatical, syntax, homophones, and other interpretive errors are inadvertently transcribed by the computer software. Please disregard these errors. Please excuse any errors that have escaped final proofreading. Interim Hospital Summary: 78 y.o. 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 - Speech therapy has evaluated patient and recommended easy to chew, thin liquids  - PT has evaluated patient   -Morphine as needed for pain  -Continue metoprolol  -Lidocaine patch  -Hospice was following  - Meclizine PRN for dizziness  - trying to reach family to revisit goals of care         There is no height or weight on file to calculate BMI. Code Status: DNR     Surrogate Decision Maker:     DVT Prophylaxis: Comfort measures   GI Prophylaxis: not indicated                  Subjective:     Chief Complaint / Reason for Physician Visit  No acute issues Discussed with RN events overnight.      Review of Systems:  No fevers, chills, appetite change, cough, sputum production, shortness of breath, dyspnea on exertion, nausea, vomitting, diarrhea, constipation, chest pain, leg edema, abdominal pain, joint pain, rash, itching. Tolerating PT/OT. Tolerating diet. Objective:     VITALS:   Last 24hrs VS reviewed since prior progress note. Most recent are:  Patient Vitals for the past 24 hrs:   Temp Pulse Resp BP SpO2   06/14/22 0719 97.8 °F (36.6 °C) 65 15 139/70 98 %   06/13/22 1955 -- -- -- -- 99 %   06/13/22 1920 98.3 °F (36.8 °C) 68 16 126/87 99 %   06/13/22 1845 98.3 °F (36.8 °C) 68 16 126/87 99 %       Intake/Output Summary (Last 24 hours) at 6/14/2022 0857  Last data filed at 6/13/2022 2216  Gross per 24 hour   Intake 60 ml   Output --   Net 60 ml        PHYSICAL EXAM:  General: Alert, cooperative, no acute distress    EENT:  EOMI. Anicteric sclerae. MMM  Resp:  CTA bilaterally, no wheezing or rales. No accessory muscle use  CV:  Regular  rhythm,  normal S1/S2, no murmurs rubs gallops, No edema  GI:  Soft, Non distended, Non tender. +Bowel sounds  Neurologic:  Alert, oriented to self and place  Psych:   Fair insight. Not anxious nor agitated  Skin:  No rashes. No jaundice    Reviewed most current lab test results and cultures  YES  Reviewed most current radiology test results   YES  Review and summation of old records today    NO  Reviewed patient's current orders and MAR    YES  PMH/ reviewed - no change compared to H&P  ________________________________________________________________________  Care Plan discussed with:    Comments   Patient x    Family      RN x    Care Manager x    Consultant                        Multidiciplinary team rounds were held today with , nursing, pharmacist and clinical coordinator. Patient's plan of care was discussed; medications were reviewed and discharge planning was addressed.      ________________________________________________________________________  Total NON critical care TIME: 15   Minutes    Total CRITICAL CARE TIME Spent:   Minutes non procedure based      Comments   >50% of visit spent in counseling and coordination of care     ________________________________________________________________________  Jessica Barnett MD     Procedures: see electronic medical records for all procedures/Xrays and details which were not copied into this note but were reviewed prior to creation of Plan. LABS:  I reviewed today's most current labs and imaging studies. Pertinent labs include:  No results for input(s): WBC, HGB, HCT, PLT, HGBEXT, HCTEXT, PLTEXT, HGBEXT, HCTEXT, PLTEXT in the last 72 hours. No results for input(s): NA, K, CL, CO2, GLU, BUN, CREA, CA, MG, PHOS, ALB, TBIL, TBILI, ALT, INR, INREXT, INREXT in the last 72 hours.     No lab exists for component: SGOT    Signed: Jessica Barnett MD

## 2022-06-14 NOTE — PROGRESS NOTES
Provided pastoral care visit to [facility] 5 patient. Did not include sacramental care.     Bethany Smith

## 2022-06-14 NOTE — PROGRESS NOTES
Bedside and Verbal shift change report given to Julienne Kelly RN (oncoming nurse) by Gwyn Trujillo (offgoing nurse). Report included the following information SBAR, Kardex and MAR.

## 2022-06-15 PROCEDURE — 74011000250 HC RX REV CODE- 250: Performed by: STUDENT IN AN ORGANIZED HEALTH CARE EDUCATION/TRAINING PROGRAM

## 2022-06-15 PROCEDURE — 97116 GAIT TRAINING THERAPY: CPT | Performed by: PHYSICAL THERAPIST

## 2022-06-15 PROCEDURE — 65270000032 HC RM SEMIPRIVATE

## 2022-06-15 PROCEDURE — 74011250637 HC RX REV CODE- 250/637: Performed by: STUDENT IN AN ORGANIZED HEALTH CARE EDUCATION/TRAINING PROGRAM

## 2022-06-15 RX ADMIN — Medication 1 TABLET: at 09:15

## 2022-06-15 RX ADMIN — ATORVASTATIN CALCIUM 20 MG: 10 TABLET, FILM COATED ORAL at 22:01

## 2022-06-15 RX ADMIN — GABAPENTIN 100 MG: 100 CAPSULE ORAL at 22:01

## 2022-06-15 RX ADMIN — GABAPENTIN 100 MG: 100 CAPSULE ORAL at 17:05

## 2022-06-15 RX ADMIN — NYSTATIN: 100000 POWDER TOPICAL at 09:15

## 2022-06-15 RX ADMIN — GABAPENTIN 100 MG: 100 CAPSULE ORAL at 09:15

## 2022-06-15 NOTE — PROGRESS NOTES
Bedside shift change report given to [address] [place] Whiting (oncoming nurse) by Jac Mann RN (offgoing nurse). Report included the following information SBAR and Kardex.

## 2022-06-15 NOTE — PROGRESS NOTES
Problem: Mobility Impaired (Adult and Pediatric)  Goal: *Acute Goals and Plan of Care (Insert Text)  Description:   FUNCTIONAL STATUS PRIOR TO ADMISSION: Patient was independent and active without use of DME.    HOME SUPPORT PRIOR TO ADMISSION: The patient lived with family but did not require assist.    Physical Therapy Goals  Initiated 5/20/2022; reviewed 6/2/2022; reviewed 6/9/22 with goals remaining appropriate:     1. Patient will move from supine to sit and sit to supine in bed with minimal assistance/contact guard assist within 7 day(s). 2.  Patient will transfer from bed to chair and chair to bed with moderate assistance  using the least restrictive device within 7 day(s). 3.  Patient will perform sit to stand with minimal assistance/contact guard assist within 7 day(s). 4.  Patient will ambulate with moderate assistance  for 20 feet with the least restrictive device within 7 day(s). 6.  Patient will improve Peralta Balance score by 7 points within 7 days. Outcome: Progressing Towards Goal     PHYSICAL THERAPY TREATMENT  Patient: Eric Randall (60 y.o. female)  Date: 6/15/2022  Diagnosis: Left-sided weakness [R53.1] <principal problem not specified>       Precautions: DNR,Fall,Skin,Bed Alarm  Chart, physical therapy assessment, plan of care and goals were reviewed. ASSESSMENT  Patient continues with skilled PT services and is progressing towards goals. Patient agreeable to therapy with less dizziness noted. Patient performed three gait trials of 15-20 feet. Patient required maximal assistance x2 with ambulation and seated rest break in between each trial. Patient required maximal manual cues to facilitate LLE and trunk to improve balance with walking and standing.      Current Level of Function Impacting Discharge (mobility/balance): max A x2 for ambulation    Other factors to consider for discharge: processing, no insurance         PLAN :  Patient continues to benefit from skilled intervention to address the above impairments. Continue treatment per established plan of care. to address goals. Recommendation for discharge: (in order for the patient to meet his/her long term goals)  LTC    This discharge recommendation:  Has been made in collaboration with the attending provider and/or case management    IF patient discharges home will need the following DME: wheelchair       SUBJECTIVE:   Patient stated I want to be able to walk like I used to.    OBJECTIVE DATA SUMMARY:   Critical Behavior:  Neurologic State: Alert,Confused  Orientation Level: Oriented to person,Disoriented to situation,Disoriented to time,Disoriented to place  Cognition: Follows commands  Safety/Judgement: Awareness of environment,Decreased awareness of need for assistance,Decreased awareness of need for safety,Decreased insight into deficits,Fall prevention  Functional Mobility Training:  Bed Mobility:     Supine to Sit: Minimum assistance;Assist x1  Sit to Supine: Minimum assistance;Assist x1  Scooting: Minimum assistance;Assist x1     Transfers:  Sit to Stand: Moderate assistance;Maximum assistance;Assist x2  Stand to Sit: Moderate assistance        Bed to Chair: Moderate assistance;Assist x1    Balance:  Sitting: Impaired; With support  Sitting - Static: Good (unsupported)  Sitting - Dynamic: Fair (occasional)  Standing: Impaired; With support;Pull to stand  Standing - Static: Poor;Constant support  Standing - Dynamic : Poor;Constant support    Ambulation/Gait Training:  Distance (ft): 15 Feet (ft) (x3)  Assistive Device: Gait belt  Ambulation - Level of Assistance: Maximum assistance;Assist x2  Gait Abnormalities: Decreased step clearance; Altered arm swing;Hemiplegic; Path deviations  Base of Support: Narrowed  Stance: Left decreased  Speed/Marcelina: Fluctuations  Swing Pattern: Left asymmetrical    Pain Rating:  Mild pain in BLEs; RN aware    Activity Tolerance:   Good    After treatment patient left in no apparent distress: Supine in bed, Call bell within reach, Bed / chair alarm activated, and Side rails x 3    COMMUNICATION/COLLABORATION:   The patients plan of care was discussed with: Physical therapist and Registered nurse.      Albin Vargas, PT   Time Calculation: 27 mins

## 2022-06-15 NOTE — PROGRESS NOTES
Per conversation with the pt's son-in-law regarding Jerry San [phone] \"Hannah Turner\"; This cm inquired about their progress with obtaining airline tickets for the pt to travel to [place]. He reported that they are waiting for the pt to receive her W/C to purchase airline tickets. He reported that once the w/c is obtained we can discharge the pt and they will arrange transport for the following week. He reported that the pt's granddaughter would stay with her when discharged. This cm inquired if he was aware that the pt would need to travel in a w/c that has a seat belt. He reported that he was aware. This cm inquired about who would travel with the pt. He reported that his wife's, Yani Randolph) cousin would travel with the pt to [place]. This cm inquired if they would need assistance with obtaining the tickets. He reported that he is working with relatives and he may take out loan if they are unable to assist. This cm informed him that he would check on the status of the W/C with the [employer]. This cm informed him that he would f/u with him tomorrow. Per [facility] [phone]: the pt would either require a 16\" W/C which is $269 or a 18\" W/C which is 249. The rep reported that both W/C are in stock.       CM: [address] [place],   [phone]

## 2022-06-15 NOTE — PROGRESS NOTES
Pt was asleep,she was awaken and then needed to use the bedpan.  Pt voided large amt urine, she was assisted with moving up in the bed,call bell in reach

## 2022-06-15 NOTE — PROGRESS NOTES
Hospitalist Progress Note    NAME: Catalina Torres   :  1955   MRN:  [id number]   Room Number:  742/53  @ [facility]     Please note that this dictation was completed with Desire Hernandez, the computer voice recognition software. Quite often unanticipated grammatical, syntax, homophones, and other interpretive errors are inadvertently transcribed by the computer software. Please disregard these errors. Please excuse any errors that have escaped final proofreading. Interim Hospital Summary: 78 y.o. 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 - Speech therapy has evaluated patient and recommended easy to chew, thin liquids  - PT has evaluated patient   -Morphine as needed for pain  -Continue metoprolol  -Lidocaine patch  -Hospice was following  - Meclizine PRN for dizziness  - trying to reach family to revisit goals of care         There is no height or weight on file to calculate BMI. Code Status: DNR     Surrogate Decision Maker:     DVT Prophylaxis: Comfort measures   GI Prophylaxis: not indicated                  Subjective:     Chief Complaint / Reason for Physician Visit  No acute issues Discussed with RN events overnight.      Review of Systems:  No fevers, chills, appetite change, cough, sputum production, shortness of breath, dyspnea on exertion, nausea, vomitting, diarrhea, constipation, chest pain, leg edema, abdominal pain, joint pain, rash, itching. Tolerating PT/OT. Tolerating diet. Objective:     VITALS:   Last 24hrs VS reviewed since prior progress note. Most recent are:  Patient Vitals for the past 24 hrs:   Temp Pulse Resp BP SpO2   06/14/22 1951 98.5 °F (36.9 °C) 65 16 (!) 123/51 96 %   06/14/22 1545 98.5 °F (36.9 °C) 71 16 (!) 111/46 96 %     No intake or output data in the 24 hours ending 06/15/22 0856     PHYSICAL EXAM:  General: Alert, cooperative, no acute distress    EENT:  EOMI. Anicteric sclerae. MMM  Resp:  CTA bilaterally, no wheezing or rales. No accessory muscle use  CV:  Regular  rhythm,  normal S1/S2, no murmurs rubs gallops, No edema  GI:  Soft, Non distended, Non tender. +Bowel sounds  Neurologic:  Alert, oriented to self and place  Psych:   Fair insight. Not anxious nor agitated  Skin:  No rashes. No jaundice    Reviewed most current lab test results and cultures  YES  Reviewed most current radiology test results   YES  Review and summation of old records today    NO  Reviewed patient's current orders and MAR    YES  PMH/ reviewed - no change compared to H&P  ________________________________________________________________________  Care Plan discussed with:    Comments   Patient x    Family      RN x    Care Manager x    Consultant                        Multidiciplinary team rounds were held today with , nursing, pharmacist and clinical coordinator. Patient's plan of care was discussed; medications were reviewed and discharge planning was addressed.      ________________________________________________________________________  Total NON critical care TIME: 15   Minutes    Total CRITICAL CARE TIME Spent:   Minutes non procedure based      Comments   >50% of visit spent in counseling and coordination of care     ________________________________________________________________________  Perez Tsai MD     Procedures: see electronic medical records for all procedures/Xrays and details which were not copied into this note but were reviewed prior to creation of Plan. LABS:  I reviewed today's most current labs and imaging studies. Pertinent labs include:  No results for input(s): WBC, HGB, HCT, PLT, HGBEXT, HCTEXT, PLTEXT, HGBEXT, HCTEXT, PLTEXT in the last 72 hours. No results for input(s): NA, K, CL, CO2, GLU, BUN, CREA, CA, MG, PHOS, ALB, TBIL, TBILI, ALT, INR, INREXT, INREXT in the last 72 hours.     No lab exists for component: SGOT    Signed: Corina Swain MD

## 2022-06-15 NOTE — PROGRESS NOTES
Bedside and Verbal shift change report given to Marlyn Christian RN (oncoming nurse) by Severo Patricia, RN (offgoing nurse). Report included the following information SBAR and Kardex.

## 2022-06-15 NOTE — PROGRESS NOTES
Problem: Falls - Risk of  Goal: *Absence of Falls  Description: Document Nicky Trent Fall Risk and appropriate interventions in the flowsheet.   Outcome: Progressing Towards Goal  Note: Fall Risk Interventions:  Mobility Interventions: Bed/chair exit alarm,Patient to call before getting OOB    Mentation Interventions: Bed/chair exit alarm,Door open when patient unattended    Medication Interventions: Teach patient to arise slowly,Patient to call before getting OOB,Bed/chair exit alarm    Elimination Interventions: Bed/chair exit alarm,Call light in reach,Toileting schedule/hourly rounds    History of Falls Interventions: Bed/chair exit alarm

## 2022-06-15 NOTE — PROGRESS NOTES
NUTRITION     RD PLAN OF CARE:   Pt tx to [facility] for comfort care; comfort measures only. Diet - easy to chew/comfort feedings only as awake/alert.     Pt is admitted with Hemorrhagic stroke/dysphagia POA ([address] [I61.9].  Comfort measures only POA. Hospice consulted. Aggressive nutrition intervention is not indicated at this time, nutrition to sign-off. Please re-consult Nutrition services should plan of care change.  Thank you

## 2022-06-15 NOTE — PROGRESS NOTES
Pt alert and verbal,tolerated meds po with water, pt given more water in pitcher, pt call bell in reach

## 2022-06-15 NOTE — PROGRESS NOTES
Bedside and Verbal shift change report given to Keily Oconnell RN (oncoming nurse) by Alphonso Bosworth, RN (offgoing nurse). Report included the following information SBAR, Kardex and MAR.

## 2022-06-16 PROCEDURE — 65270000032 HC RM SEMIPRIVATE

## 2022-06-16 PROCEDURE — 97116 GAIT TRAINING THERAPY: CPT | Performed by: PHYSICAL THERAPIST

## 2022-06-16 PROCEDURE — 74011250637 HC RX REV CODE- 250/637: Performed by: STUDENT IN AN ORGANIZED HEALTH CARE EDUCATION/TRAINING PROGRAM

## 2022-06-16 PROCEDURE — 97535 SELF CARE MNGMENT TRAINING: CPT

## 2022-06-16 PROCEDURE — 74011000250 HC RX REV CODE- 250: Performed by: STUDENT IN AN ORGANIZED HEALTH CARE EDUCATION/TRAINING PROGRAM

## 2022-06-16 PROCEDURE — 97112 NEUROMUSCULAR REEDUCATION: CPT

## 2022-06-16 RX ADMIN — NYSTATIN: 100000 POWDER TOPICAL at 17:13

## 2022-06-16 RX ADMIN — ATORVASTATIN CALCIUM 20 MG: 10 TABLET, FILM COATED ORAL at 22:30

## 2022-06-16 RX ADMIN — GABAPENTIN 100 MG: 100 CAPSULE ORAL at 09:12

## 2022-06-16 RX ADMIN — GABAPENTIN 100 MG: 100 CAPSULE ORAL at 22:30

## 2022-06-16 RX ADMIN — NYSTATIN: 100000 POWDER TOPICAL at 09:13

## 2022-06-16 RX ADMIN — GABAPENTIN 100 MG: 100 CAPSULE ORAL at 17:13

## 2022-06-16 NOTE — PROGRESS NOTES
1920) Bedside and Verbal shift change report given to BOB Pleitez (oncoming nurse) by BOB Monahan (offgoing nurse). Report included the following information SBAR, Kardex, Intake/Output and MAR.      1950)  PM assessment done. .Patient is AOx4. VS taken. Patient stable at this time. Patient is in bed resting comfortably. Patient denies any pain at this time.  No distress noted.     2230) Bedtime medication taken whole with 60 ml of water. Scheduled Lidocaine 4 % patch applied to the back. 9982) Patient was rounded on. Patient is resting comfortably in bed.     0227) Patient was rounded on. Patient is sleeping comfortably in bed.     0423) Patient was rounded on. Patient is sleeping comfortably in bed.     0630) Patient was assisted to use bedpan with output of 500 ml, clear. Perineal care done. 0732) Bedside and Verbal shift change report given to BOB Monahan (oncoming nurse) by Estefani Freeman RN (offgoing nurse). Report included the following information SBAR, Kardex, Intake/Output, MAR,and Recent Results.

## 2022-06-16 NOTE — PROGRESS NOTES
3012  Shift change report received from [address]. Report included review of SBAR, accordion report, results review, orders, meds, ROS, and POC. All questions answered. Transfer of care complete.

## 2022-06-16 NOTE — PROGRESS NOTES
Hospitalist Progress Note    NAME: Eric Randall   :  1955   MRN:  [id number]   Room Number:  517/62  @ [facility]     Please note that this dictation was completed with [employer], the computer voice recognition software. Quite often unanticipated grammatical, syntax, homophones, and other interpretive errors are inadvertently transcribed by the computer software. Please disregard these errors. Please excuse any errors that have escaped final proofreading. Interim Hospital Summary: 78 y.o. 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 - Speech therapy has evaluated patient and recommended easy to chew, thin liquids  - PT has evaluated patient   -Morphine as needed for pain  -Continue metoprolol  -Lidocaine patch  -Hospice was following  - Meclizine PRN for dizziness  - trying to reach family to revisit goals of care         There is no height or weight on file to calculate BMI. Code Status: DNR     Surrogate Decision Maker:     DVT Prophylaxis: Comfort measures   GI Prophylaxis: not indicated          Subjective:     Chief Complaint / Reason for Physician Visit  No acute issues  Discussed with RN events overnight.      Review of Systems:  No fevers, chills, appetite change, cough, sputum production, shortness of breath, dyspnea on exertion, nausea, vomitting, diarrhea, constipation, chest pain, leg edema, abdominal pain, joint pain, rash, itching. Tolerating PT/OT. Tolerating diet. Objective:     VITALS:   Last 24hrs VS reviewed since prior progress note. Most recent are:  Patient Vitals for the past 24 hrs:   Temp Pulse Resp BP SpO2   06/16/22 0716 98.7 °F (37.1 °C) 80 16 (!) 99/45 92 %   06/15/22 2030 99.1 °F (37.3 °C) 68 16 132/74 98 %   06/15/22 1520 99 °F (37.2 °C) 72 16 (!) 128/56 100 %       Intake/Output Summary (Last 24 hours) at 6/16/2022 1159  Last data filed at 6/15/2022 2040  Gross per 24 hour   Intake --   Output 350 ml   Net -350 ml        PHYSICAL EXAM:  General: WD, WN. Alert, cooperative, no acute distress    EENT:  EOMI. Anicteric sclerae. MMM  Resp:  CTA bilaterally, no wheezing or rales. No accessory muscle use  CV:  Regular  rhythm,  normal S1/S2, no murmurs rubs gallops, No edema  GI:  Soft, Non distended, Non tender. +Bowel sounds  Neurologic:  Alert and oriented X self  Psych:   Good insight. Not anxious nor agitated  Skin:  No rashes. No jaundice    Reviewed most current lab test results and cultures  YES  Reviewed most current radiology test results   YES  Review and summation of old records today    NO  Reviewed patient's current orders and MAR    YES  PMH/ reviewed - no change compared to H&P  ________________________________________________________________________  Care Plan discussed with:    Comments   Patient x    Family      RN x    Care Manager x    Consultant                       x Multidiciplinary team rounds were held today with , nursing, pharmacist and clinical coordinator. Patient's plan of care was discussed; medications were reviewed and discharge planning was addressed.      ________________________________________________________________________  Total NON critical care TIME:  15  Minutes    Total CRITICAL CARE TIME Spent:   Minutes non procedure based      Comments   >50% of visit spent in counseling and coordination of care ________________________________________________________________________  Eloise Bennett MD     Procedures: see electronic medical records for all procedures/Xrays and details which were not copied into this note but were reviewed prior to creation of Plan. LABS:  I reviewed today's most current labs and imaging studies. Pertinent labs include:  No results for input(s): WBC, HGB, HCT, PLT, HGBEXT, HCTEXT, PLTEXT in the last 72 hours. No results for input(s): NA, K, CL, CO2, GLU, BUN, CREA, CA, MG, PHOS, ALB, TBIL, TBILI, ALT, INR, INREXT in the last 72 hours.     No lab exists for component: SGOT    Signed: Eloise Bennett MD

## 2022-06-16 NOTE — PROGRESS NOTES
AVERY      RUR:10%    CM called [facility] [phone], 18\" standard WC w/ seatbelt cost $249. Accept payments credit card over the phone and checks. Pickup and delivery available (delivery fee). Pt family met w/ passport embassy, passport will be ready 6/17. Pt son in law Doug Bauer will pick it up 6/21 due to work schedule.     Joshua Lange, [address], [phone]

## 2022-06-16 NOTE — PROGRESS NOTES
Problem: Mobility Impaired (Adult and Pediatric)  Goal: *Acute Goals and Plan of Care (Insert Text)  Description:   FUNCTIONAL STATUS PRIOR TO ADMISSION: Patient was independent and active without use of DME.    HOME SUPPORT PRIOR TO ADMISSION: The patient lived with family but did not require assist.    Physical Therapy Goals  Initiated 5/20/2022; reviewed 6/2/2022; reviewed 6/9/22 with goals remaining appropriate:     1. Patient will move from supine to sit and sit to supine in bed with minimal assistance/contact guard assist within 7 day(s). 2.  Patient will transfer from bed to chair and chair to bed with moderate assistance using the least restrictive device within 7 day(s). 3.  Patient will perform sit to stand with minimal assistance/contact guard assist within 7 day(s). 4.  Patient will ambulate with moderate assistance  for 20 feet with the least restrictive device within 7 day(s). 5.  Patient will improve Peralta Balance score by 7 points within 7 days. Outcome: Progressing Towards Goal    PHYSICAL THERAPY TREATMENT  Patient: Indiana Tipton (74 y.o. female)  Date: 6/16/2022  Diagnosis: Left-sided weakness [R53.1] <principal problem not specified>       Precautions: DNR,Fall,Skin,Bed Alarm  Chart, physical therapy assessment, plan of care and goals were reviewed. ASSESSMENT  Patient continues with skilled PT services and is progressing towards goals. Patient performed bed mobility with CGA to min A. Patient stood with minimal assist x2. Patient performed two gait trials of 15 feet x2 with maximal assistance and chair follow. One seated rest break in between gait trials. Patient required maximal manual cues to facilitate LLE and trunk to improve balance and mobility. Current Level of Function Impacting Discharge (mobility/balance): Max A         PLAN :  Patient continues to benefit from skilled intervention to address the above impairments. Continue treatment per established plan of care.   to address goals. Recommendation for discharge: (in order for the patient to meet his/her long term goals)  To be determined: Home with family to [place]    This discharge recommendation:  Has been made in collaboration with the attending provider and/or case management    IF patient discharges home will need the following DME: wheelchair       SUBJECTIVE:   Patient stated Terell tillman.     OBJECTIVE DATA SUMMARY:   Critical Behavior:  Neurologic State: Alert  Orientation Level: Oriented to person,Oriented to place,Oriented to situation  Cognition: Follows commands,Impulsive  Safety/Judgement: Decreased insight into deficits,Fall prevention  Functional Mobility Training:   Co-tx with OT as pt required the knowledge and skills of 2 therapists for LUE and LE muscle facilitation and inhibition, cognitive instruction, attention to task, maximizing function and improving safety, midline awareness and balance.    Bed Mobility:     Supine to Sit: Contact guard assistance; Additional time;Bed Modified  Sit to Supine: Minimum assistance  Scooting: Minimum assistance    Transfers:  Sit to Stand: Minimum assistance;Assist x2  Stand to Sit: Moderate assistance  Bed to Chair: Moderate assistance    Balance:  Sitting: Impaired  Sitting - Static: Good (unsupported)  Sitting - Dynamic: Fair (occasional)  Standing: Impaired  Standing - Static: Fair;Constant support  Standing - Dynamic : Poor;Constant support    Ambulation/Gait Training:  Distance (ft): 15 Feet (ft) (X2)  Assistive Device: Gait belt  Ambulation - Level of Assistance: Maximum assistance;Assist x2  Gait Abnormalities: Decreased step clearance; Altered arm swing;Hemiplegic; Path deviations  Base of Support: Narrowed  Stance: Left decreased  Speed/Marcelina: Fluctuations  Swing Pattern: Left asymmetrical      Pain Rating:  No pain    Activity Tolerance:   Good    After treatment patient left in no apparent distress:   Supine in bed, Call bell within reach, Bed / chair alarm activated, and Side rails x 3    COMMUNICATION/COLLABORATION:   The patients plan of care was discussed with: Occupational therapist and Registered nurse.      Micah Lopez, PT   Time Calculation: 37 mins

## 2022-06-16 NOTE — PROGRESS NOTES
Problem: Falls - Risk of  Goal: *Absence of Falls  Description: Document Santino Betancur Fall Risk and appropriate interventions in the flowsheet. Outcome: Progressing Towards Goal  Note: Fall Risk Interventions:  Mobility Interventions: Bed/chair exit alarm,Patient to call before getting OOB    Mentation Interventions: Bed/chair exit alarm,Door open when patient unattended    Medication Interventions: Teach patient to arise slowly,Patient to call before getting OOB,Bed/chair exit alarm    Elimination Interventions: Bed/chair exit alarm,Call light in reach,Toileting schedule/hourly rounds    History of Falls Interventions: Bed/chair exit alarm,Door open when patient unattended         Problem: Pressure Injury - Risk of  Goal: *Prevention of pressure injury  Description: Document Eugene Scale and appropriate interventions in the flowsheet.   Outcome: Progressing Towards Goal  Note: Pressure Injury Interventions:  Sensory Interventions: Keep linens dry and wrinkle-free    Moisture Interventions: Apply protective barrier, creams and emollients    Activity Interventions: PT/OT evaluation    Mobility Interventions: PT/OT evaluation    Nutrition Interventions: Offer support with meals,snacks and hydration    Friction and Shear Interventions: Minimize layers

## 2022-06-16 NOTE — PROGRESS NOTES
Problem: Self Care Deficits Care Plan (Adult)  Goal: *Acute Goals and Plan of Care (Insert Text)  Description: FUNCTIONAL STATUS PRIOR TO ADMISSION: Patient was independent and active without use of DME.    HOME SUPPORT: The patient lived with family but did not require assist.    Occupational Therapy Goals  Initiated 5/23/2022, Weekly re-evaluation 5/31/2022, 6/13/22-continue all goals  1. Patient will perform seated grooming with minimal assistance  within 7 day(s). 2.  Patient will perform upper body dressing with moderate assistance  within 7 day(s). 3.  Patient will perform lower body dressing with moderate assistance within 7 day(s). 4.  Patient will perform toilet transfers to [facility] with moderate assistance within 7 day(s). 5.  Patient will perform all aspects of toileting with moderate assistance  within 7 day(s). 6.  Patient will participate in upper extremity therapeutic exercise/activities with maximal assistance within 7 day(s). 7.  Patient will utilize energy conservation and fall prevention techniques during functional activities with verbal cues within 7 day(s). 8.  Patient will improve their Fugl Harris score by 5 points in prep for ADLs within 7 days. MET 6/13/22 and continue to increase by 5 points. Outcome: Progressing Towards Goal   OCCUPATIONAL THERAPY TREATMENT  Patient: Aurea Moreira (26 y.o. female)  Date: 6/16/2022  Diagnosis: Left-sided weakness [R53.1] <principal problem not specified>       Precautions: DNR,Fall,Skin,Bed Alarm  Chart, occupational therapy assessment, plan of care, and goals were reviewed. ASSESSMENT  Patient continues with skilled OT services and is progressing towards goals. Patient is received resting in bed, agreeable to session. She continues to demonstrate steady gains in functional mobility and self-care. Patient dons socks and threads LEs through brief at EOB with overall min assist, benefiting from instruction in compensatory strategy.  In standing for lower body dressing, patient initially with no attempt to engage LUE in task, however is able to grasp with L hand and assist in pulling up over hips when cued. Patient with good participation in LUE neuro re-education sitting EOB, reporting good carryover of exercises outside of therapy sessions. Patient standing significantly improved, standing from EOB with CGA (with R hand grasp of bed rail) to min assist x2. Although recommend IPR, understand plan is for discharge and return to [place] with family support. Current Level of Function Impacting Discharge (ADLs): up to mod A    Other factors to consider for discharge:          PLAN :  Patient continues to benefit from skilled intervention to address the above impairments. Continue treatment per established plan of care to address goals. Recommendation for discharge: (in order for the patient to meet his/her long term goals)  Therapy 3 hours per day 5-7 days per week; Understand plan is for return to [place] with family. This discharge recommendation:  Has been made in collaboration with the attending provider and/or case management    IF patient discharges home will need the following DME: wheelchair       SUBJECTIVE:   Patient stated Winda Face.     OBJECTIVE DATA SUMMARY:   Cognitive/Behavioral Status:  Neurologic State: Alert  Orientation Level: Oriented to person;Oriented to place;Oriented to situation  Cognition: Follows commands; Impulsive  Perception: Cues to attend to left side of body;Verbal;Visual;Tactile  Perseveration: No perseveration noted  Safety/Judgement: Decreased insight into deficits; Fall prevention    Functional Mobility and Transfers for ADLs:  Bed Mobility:  Supine to Sit: Contact guard assistance; Additional time;Bed Modified  Sit to Supine: Minimum assistance  Scooting: Minimum assistance    Transfers:  Sit to Stand: Minimum assistance;Assist x2   Stand to Sit: Moderate assistance     Balance:  Sitting: Impaired  Sitting - Static: Good (unsupported)  Sitting - Dynamic: Fair (occasional)  Standing: Impaired  Standing - Static: Fair;Constant support  Standing - Dynamic : Poor;Constant support    ADL Intervention:  Feeding  Feeding Assistance: Moderate assistance (using L hand)  Drink to Mouth: Minimum assistance; Moderate assistance    Lower Body Dressing Assistance  Dressing Assistance: Moderate assistance  Underpants: Moderate assistance; Compensatory technique training  Socks: Moderate assistance; Compensatory technique training  Leg Crossed Method Used: Yes  Position Performed: Seated edge of bed  Cues: Verbal cues provided;Visual cues provided;Physical assistance    Cognitive Retraining  Orientation Retraining: Awareness of environment; Reorienting  Problem Solving: Identifying the problem;General alternative solution  Executive Functions: Executing cognitive plans  Organizing/Sequencing: Breaking task down  Attention to Task: Single task  Safety/Judgement: Decreased insight into deficits; Fall prevention    Neuro Re-Education & Therapeutic Exercises:  -LUE proprioceptive input via weight-bearing during EOB sitting and sit<>stand transfers  -L gross motor coordination w/ reach to target in multiple planes, requiring increased time and with mild dysmetria  -L fine motor coordination and finger isolation exercise, requiring increased time and with accessory proximal UE movement  -LUE functional use in pulling up briefs in standing with RUE support    Pain:  No reports. Activity Tolerance:   Good, Fair and requires rest breaks    After treatment patient left in no apparent distress:   Supine in bed, Call bell within reach, Bed / chair alarm activated and Side rails x 3    COMMUNICATION/COLLABORATION:   The patients plan of care was discussed with: Physical therapist and Registered nurse. Patient was educated regarding Her deficit(s) .   She demonstrated Good understanding as evidenced by verbalization and engagement in session. Patient and/or family was verbally educated on the BE FAST acronym for signs/symptoms of CVA and TIA. BE FAST was written on patient's communication board  for visual education and reinforcement. All questions answered with patient indicating good understanding.      Jeremías Macedo OT  Time Calculation: 36 mins

## 2022-06-16 NOTE — PROGRESS NOTES
2030:  Resident assisted to bedside commode, assessment completed. 0028: Alarm reset.   Patient assisted to change position, heels floated, patient given blanket per request.

## 2022-06-16 NOTE — PROGRESS NOTES
Care plan reviewed. Problem: Falls - Risk of  Goal: *Absence of Falls  Description: Document Telly Peoples Fall Risk and appropriate interventions in the flowsheet. Outcome: Progressing Towards Goal  Note: Fall Risk Interventions:  Mobility Interventions: Assess mobility with egress test,Bed/chair exit alarm,Communicate number of staff needed for ambulation/transfer,Patient to call before getting OOB,Utilize walker, cane, or other assistive device    Mentation Interventions: Adequate sleep, hydration, pain control,Bed/chair exit alarm,Door open when patient unattended,Evaluate medications/consider consulting pharmacy,Room close to nurse's station,Toileting rounds,Update white board    Medication Interventions: Bed/chair exit alarm,Evaluate medications/consider consulting pharmacy,Patient to call before getting OOB,Teach patient to arise slowly    Elimination Interventions: Bed/chair exit alarm,Call light in reach    History of Falls Interventions: Bed/chair exit alarm,Room close to nurse's station         Problem: Patient Education: Go to Patient Education Activity  Goal: Patient/Family Education  Outcome: Progressing Towards Goal     Problem: Pressure Injury - Risk of  Goal: *Prevention of pressure injury  Description: Document Eugene Scale and appropriate interventions in the flowsheet.   Outcome: Progressing Towards Goal  Note: Pressure Injury Interventions:  Sensory Interventions: Avoid rigorous massage over bony prominences,Keep linens dry and wrinkle-free,Maintain/enhance activity level    Moisture Interventions: Absorbent underpads,Apply protective barrier, creams and emollients,Maintain skin hydration (lotion/cream),Minimize layers    Activity Interventions: Pressure redistribution bed/mattress(bed type),PT/OT evaluation    Mobility Interventions: HOB 30 degrees or less,Pressure redistribution bed/mattress (bed type),PT/OT evaluation    Nutrition Interventions: Offer support with meals,snacks and hydration    Friction and Shear Interventions: HOB 30 degrees or less,Minimize layers                Problem: Patient Education: Go to Patient Education Activity  Goal: Patient/Family Education  Outcome: Progressing Towards Goal     Problem: Patient Education: Go to Patient Education Activity  Goal: Patient/Family Education  Outcome: Progressing Towards Goal     Problem: Patient Education: Go to Patient Education Activity  Goal: Patient/Family Education  Outcome: Progressing Towards Goal

## 2022-06-16 NOTE — PROGRESS NOTES
1100) Verbal shift change report given to Amanda Manley RN (oncoming nurse) by Nereida Osborn RN (offgoing nurse). Report included the following information SBAR, Kardex, Intake/Output, MAR and Recent Results. 1110) Pt resting in bed at this time and requesting something to cut nails. Nail file provided pt resting in bed comfortably at this time. 1245) Pt assessed. Vital signs not obtained r/t extended stay status. Pt resting in bed at this time and stated no needs. Left side remains flaccid. Pt calm and cooperative at this time. Pt has no c/o pain. 1340) Pt resting in bed at this time. Water pitcher refilled per request. Family at the bedside at this time. 1430) Pt repositioned in bed and stated no additional needs at this time. 3    1505) Pt reassessed and no changes to note. Vital signs not obtained r/t pt's extended stay status. Pt resting in bed and stated no needs at this time. 1640) Pt resting in bed at this time and stated no needs. 1715) Scheduled meds given. Pt repositioned in bed and sitting up eating at this time. 1825) Pt resting in bed. Lights dimmed. Pt stated no additional needs at this time. 1910) Bedside shift change report given to Nicole Irealnd RN (oncoming nurse) by Amanda Manley RN (offgoing nurse). Report included the following information SBAR, Kardex, Intake/Output, MAR and Recent Results.

## 2022-06-17 PROCEDURE — 74011000250 HC RX REV CODE- 250: Performed by: STUDENT IN AN ORGANIZED HEALTH CARE EDUCATION/TRAINING PROGRAM

## 2022-06-17 PROCEDURE — 65270000032 HC RM SEMIPRIVATE

## 2022-06-17 PROCEDURE — 74011250637 HC RX REV CODE- 250/637: Performed by: STUDENT IN AN ORGANIZED HEALTH CARE EDUCATION/TRAINING PROGRAM

## 2022-06-17 RX ADMIN — ATORVASTATIN CALCIUM 20 MG: 10 TABLET, FILM COATED ORAL at 21:39

## 2022-06-17 RX ADMIN — GABAPENTIN 100 MG: 100 CAPSULE ORAL at 08:59

## 2022-06-17 RX ADMIN — GABAPENTIN 100 MG: 100 CAPSULE ORAL at 16:39

## 2022-06-17 RX ADMIN — NYSTATIN: 100000 POWDER TOPICAL at 09:01

## 2022-06-17 RX ADMIN — GABAPENTIN 100 MG: 100 CAPSULE ORAL at 21:39

## 2022-06-17 RX ADMIN — MORPHINE SULFATE 15 MG: 15 TABLET ORAL at 14:00

## 2022-06-17 RX ADMIN — MORPHINE SULFATE 30 MG: 15 TABLET ORAL at 21:39

## 2022-06-17 RX ADMIN — NYSTATIN: 100000 POWDER TOPICAL at 17:34

## 2022-06-17 RX ADMIN — Medication 1 TABLET: at 08:59

## 2022-06-17 NOTE — PROGRESS NOTES
AVERY    Plan:  Purchase w/c-Admin to call [employer]  Passport approval-waiting  Transportation-family working on funds for flight  Medications-To be called to outpatient pharmacy, [facility] to provide voucher    CM called [employer] [phone], 18\" standard WC w/ seatbelt cost $249. Accept payments credit card over the phone and checks. Pickup and delivery available (delivery fee). Pt family met w/ passport char, passport will be ready 6/17. Pt son in law Doug Bauer will pick it up 6/21 due to work schedule    Spoke w/ John Shi w/ [facility]. Upon payment they can deliver today or Tuesday (delivery fee $50) final cost $299. Above information sent to  Admin of Nursing to call to make payment. Will f/u on Monday and f/u w/ family to see if passport has been approved. Family may possibly take pt home for a few days before flight to [place].     Rashaad Young, [address], [phone]

## 2022-06-17 NOTE — PROGRESS NOTES
Hospitalist Progress Note    NAME: Chadd Macdonald   :  1955   MRN:  [id number]   Room Number:  200/11  @ [facility]     Please note that this dictation was completed with [employer], the computer voice recognition software. Quite often unanticipated grammatical, syntax, homophones, and other interpretive errors are inadvertently transcribed by the computer software. Please disregard these errors. Please excuse any errors that have escaped final proofreading. Interim Hospital Summary: 78 y.o. 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 - Speech therapy has evaluated patient and recommended easy to chew, thin liquids  - PT has evaluated patient   -Morphine as needed for pain  -Continue metoprolol  -Lidocaine patch  -Hospice was following  - Meclizine PRN for dizziness  - trying to reach family to revisit goals of care         There is no height or weight on file to calculate BMI. Code Status: DNR     Surrogate Decision Maker:     DVT Prophylaxis: Comfort measures   GI Prophylaxis: not indicated         Subjective:     Chief Complaint / Reason for Physician Visit  No acute issues. Discussed with RN events overnight.      Review of Systems:  No fevers, chills, appetite change, cough, sputum production, shortness of breath, dyspnea on exertion, nausea, vomitting, diarrhea, constipation, chest pain, leg edema, abdominal pain, joint pain, rash, itching. Tolerating PT/OT. Tolerating diet. Objective:     VITALS:   Last 24hrs VS reviewed since prior progress note. Most recent are:  Patient Vitals for the past 24 hrs:   Temp Pulse Resp BP SpO2   06/17/22 0752 97.8 °F (36.6 °C) 64 18 (!) 113/54 97 %   06/16/22 1950 -- -- -- -- 95 %   06/16/22 1948 98.3 °F (36.8 °C) 62 17 129/61 95 %       Intake/Output Summary (Last 24 hours) at 6/17/2022 1410  Last data filed at 6/17/2022 0630  Gross per 24 hour   Intake 60 ml   Output 500 ml   Net -440 ml        PHYSICAL EXAM:  General: WD, WN. Alert, cooperative, no acute distress    EENT:  EOMI. Anicteric sclerae. MMM  Resp:  CTA bilaterally, no wheezing or rales. No accessory muscle use  CV:  Regular  rhythm,  normal S1/S2, no murmurs rubs gallops, No edema  GI:  Soft, Non distended, Non tender. +Bowel sounds  Neurologic:  Alert and oriented X 3, normal speech,   Psych:   Good insight. Not anxious nor agitated  Skin:  No rashes. No jaundice    Reviewed most current lab test results and cultures  YES  Reviewed most current radiology test results   YES  Review and summation of old records today    NO  Reviewed patient's current orders and MAR    YES  PMH/ reviewed - no change compared to H&P  ________________________________________________________________________  Care Plan discussed with:    Comments   Patient x    Family      RN x    Care Manager x    Consultant                        Multidiciplinary team rounds were held today with , nursing, pharmacist and clinical coordinator. Patient's plan of care was discussed; medications were reviewed and discharge planning was addressed.      ________________________________________________________________________  Total NON critical care TIME:  25 Minutes    Total CRITICAL CARE TIME Spent:   Minutes non procedure based      Comments   >50% of visit spent in counseling and coordination of care ________________________________________________________________________  Kajal Montgomery MD     Procedures: see electronic medical records for all procedures/Xrays and details which were not copied into this note but were reviewed prior to creation of Plan. LABS:  I reviewed today's most current labs and imaging studies. Pertinent labs include:  No results for input(s): WBC, HGB, HCT, PLT, HGBEXT, HCTEXT, PLTEXT in the last 72 hours. No results for input(s): NA, K, CL, CO2, GLU, BUN, CREA, CA, MG, PHOS, ALB, TBIL, TBILI, ALT, INR, INREXT in the last 72 hours.     No lab exists for component: SGOT    Signed: Kajal Montgomery MD

## 2022-06-17 NOTE — PROGRESS NOTES
Problem: Falls - Risk of  Goal: *Absence of Falls  Description: Document Telly Peoples Fall Risk and appropriate interventions in the flowsheet. Outcome: Progressing Towards Goal  Note: Fall Risk Interventions:  Mobility Interventions: Bed/chair exit alarm,Mechanical lift,PT Consult for mobility concerns,Utilize walker, cane, or other assistive device    Mentation Interventions: Bed/chair exit alarm,Door open when patient unattended,More frequent rounding,Reorient patient    Medication Interventions: Teach patient to arise slowly    Elimination Interventions: Call light in reach,Toileting schedule/hourly rounds    History of Falls Interventions: Bed/chair exit alarm,Door open when patient unattended,Room close to nurse's station         Problem: Pressure Injury - Risk of  Goal: *Prevention of pressure injury  Description: Document Eugene Scale and appropriate interventions in the flowsheet. Outcome: Progressing Towards Goal  Note: Pressure Injury Interventions:  Sensory Interventions: Assess changes in LOC    Moisture Interventions: Absorbent underpads,Apply protective barrier, creams and emollients,Minimize layers    Activity Interventions: Increase time out of bed,PT/OT evaluation    Mobility Interventions: HOB 30 degrees or less,Turn and reposition approx.  every two hours(pillow and wedges)    Nutrition Interventions: Offer support with meals,snacks and hydration    Friction and Shear Interventions: Apply protective barrier, creams and emollients,HOB 30 degrees or less

## 2022-06-17 NOTE — PROGRESS NOTES
AVERY     RUR 11%     Just got confirmation from Administration. The Wheel Chair has been approved and will be delivered to the hospital this evening. To Follow-up with family for next steps. Plan   Confirmation of Passport   Travel date and ticket purchase - family working on this. Medication to be filled in our outpatient on a voucher.      [address] EARNEST RN   [phone]

## 2022-06-17 NOTE — PROGRESS NOTES
0725) Bedside shift change report given to Ellis Olson, RN and [facility], RN (oncoming nurse) by Romana Pak, RN (offgoing nurse). Report included the following information SBAR, Kardex, Intake/Output and MAR. Pt asleep in bed at this time. Rise and fall of chest noted. 4098) Pt assessed and vital signs stable. Pt has no c/o pain at this time. Pt stated tingling and decreased sensation on left side.  and hearing aid used. Pt repositioned in bed. Scheduled meds given and pt left resting in bed eating at this time. 8266) Pt resting in bed at this time. 56) Interdisciplinary team rounds were held 6/17/2022 with the following team members:Care Management, Nursing, Pharmacy and Physician. Plan of care discussed. See clinical pathway and/or care plan for interventions and desired outcomes. 1040) No lidocaine patch available for removal. Tech at the bedside to assist with self care    1120) Pt resting in bed at this time. Ice provided per request.     1200) Pt resting in bed at this time. No needs stated. 1240) Pt assisted up to bedside commode with 2 assist and back to bed. Pt stated no additional needs at this time. 1335) Pt resting in bed at this time. 1400) 15 mfg morphine given for pain to left arm and back. Pt repositioned in bed and stated no additional needs at this time. 1515) Pt reassessed and no changes to note. Vital signs not obtained r/t pt's extended stay status. Pt resting in bed at this time. Pt stated pain resolved to 4/10     1640) Scheduled gabapentin given. Pt repositioned in bed and stated no additional needs at this time. 1735) Pt resting in bed nystatin given. 1845) Pt resting in bed at this time    1910) Bedside shift change report given to Zamzam Atwood, RN (oncoming nurse) by Antony Dos Santos and [facility], RN (offgoing nurse). Report included the following information SBAR, Kardex, Intake/Output, MAR and Recent Results.

## 2022-06-18 PROCEDURE — 74011250637 HC RX REV CODE- 250/637: Performed by: STUDENT IN AN ORGANIZED HEALTH CARE EDUCATION/TRAINING PROGRAM

## 2022-06-18 PROCEDURE — 65270000032 HC RM SEMIPRIVATE

## 2022-06-18 PROCEDURE — 74011000250 HC RX REV CODE- 250: Performed by: STUDENT IN AN ORGANIZED HEALTH CARE EDUCATION/TRAINING PROGRAM

## 2022-06-18 RX ADMIN — GABAPENTIN 100 MG: 100 CAPSULE ORAL at 08:50

## 2022-06-18 RX ADMIN — NYSTATIN: 100000 POWDER TOPICAL at 17:52

## 2022-06-18 RX ADMIN — ATORVASTATIN CALCIUM 20 MG: 10 TABLET, FILM COATED ORAL at 21:57

## 2022-06-18 RX ADMIN — Medication 1 TABLET: at 08:50

## 2022-06-18 RX ADMIN — GABAPENTIN 100 MG: 100 CAPSULE ORAL at 21:57

## 2022-06-18 RX ADMIN — NYSTATIN: 100000 POWDER TOPICAL at 08:50

## 2022-06-18 RX ADMIN — MORPHINE SULFATE 30 MG: 15 TABLET ORAL at 19:45

## 2022-06-18 RX ADMIN — GABAPENTIN 100 MG: 100 CAPSULE ORAL at 16:49

## 2022-06-18 NOTE — PROGRESS NOTES
Hospitalist Progress Note    NAME: Philipp South   :  1955   MRN:  [id number]   Room Number:  892/42  @ [facility]     Please note that this dictation was completed with [employer], the computer voice recognition software. Quite often unanticipated grammatical, syntax, homophones, and other interpretive errors are inadvertently transcribed by the computer software. Please disregard these errors. Please excuse any errors that have escaped final proofreading. Interim Hospital Summary: 78 y.o. 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 - Speech therapy has evaluated patient and recommended easy to chew, thin liquids  - PT has evaluated patient   -Morphine as needed for pain  -Continue metoprolol  -Lidocaine patch  -Hospice was following  - Meclizine PRN for dizziness  - trying to reach family to revisit goals of care         There is no height or weight on file to calculate BMI. Code Status: DNR     Surrogate Decision Maker:     DVT Prophylaxis: Comfort measures   GI Prophylaxis: not indicated         Subjective:     Chief Complaint / Reason for Physician Visit  No acute issues. Discussed with RN events overnight.      Review of Systems:  No fevers, chills, appetite change, cough, sputum production, shortness of breath, dyspnea on exertion, nausea, vomitting, diarrhea, constipation, chest pain, leg edema, abdominal pain, joint pain, rash, itching. Tolerating PT/OT. Tolerating diet. Objective:     VITALS:   Last 24hrs VS reviewed since prior progress note. Most recent are:  Patient Vitals for the past 24 hrs:   Temp Pulse Resp BP SpO2   06/18/22 0847 97.3 °F (36.3 °C) 70 18 121/60 98 %   06/17/22 1925 98 °F (36.7 °C) 68 16 129/64 98 %       Intake/Output Summary (Last 24 hours) at 6/18/2022 0914  Last data filed at 6/17/2022 2230  Gross per 24 hour   Intake 300 ml   Output 450 ml   Net -150 ml        PHYSICAL EXAM:  General: WD, WN. Alert, cooperative, no acute distress    EENT:  EOMI. Anicteric sclerae. MMM  Resp:  CTA bilaterally, no wheezing or rales. No accessory muscle use  CV:  Regular  rhythm,  normal S1/S2, no murmurs rubs gallops, No edema  GI:  Soft, Non distended, Non tender. +Bowel sounds  Neurologic:  Alert and oriented X 3, normal speech,   Psych:   Good insight. Not anxious nor agitated  Skin:  No rashes. No jaundice    Reviewed most current lab test results and cultures  YES  Reviewed most current radiology test results   YES  Review and summation of old records today    NO  Reviewed patient's current orders and MAR    YES  PMH/SH reviewed - no change compared to H&P  ________________________________________________________________________  Care Plan discussed with:    Comments   Patient x    Family      RN x    Care Manager x    Consultant                        Multidiciplinary team rounds were held today with , nursing, pharmacist and clinical coordinator. Patient's plan of care was discussed; medications were reviewed and discharge planning was addressed.      ________________________________________________________________________  Total NON critical care TIME:  25 Minutes    Total CRITICAL CARE TIME Spent:   Minutes non procedure based      Comments   >50% of visit spent in counseling and coordination of care ________________________________________________________________________  Genevia Sicard, MD     Procedures: see electronic medical records for all procedures/Xrays and details which were not copied into this note but were reviewed prior to creation of Plan. LABS:  I reviewed today's most current labs and imaging studies. Pertinent labs include:  No results for input(s): WBC, HGB, HCT, PLT, HGBEXT, HCTEXT, PLTEXT, HGBEXT, HCTEXT, PLTEXT in the last 72 hours. No results for input(s): NA, K, CL, CO2, GLU, BUN, CREA, CA, MG, PHOS, ALB, TBIL, TBILI, ALT, INR, INREXT, INREXT in the last 72 hours.     No lab exists for component: SGOT    Signed: Genevia Sicard, MD

## 2022-06-18 NOTE — PROGRESS NOTES
Problem: Falls - Risk of  Goal: *Absence of Falls  Description: Document [place] Fall Risk and appropriate interventions in the flowsheet. Outcome: Progressing Towards Goal  Note: Fall Risk Interventions:  Mobility Interventions: Bed/chair exit alarm,OT consult for ADLs,Patient to call before getting OOB,Utilize walker, cane, or other assistive device    Mentation Interventions: Door open when patient unattended,Eyeglasses and hearing aids,Gait belt with transfers/ambulation,More frequent rounding,Room close to nurse's station    Medication Interventions: Bed/chair exit alarm,Patient to call before getting OOB,Teach patient to arise slowly    Elimination Interventions: Call light in reach,Patient to call for help with toileting needs    History of Falls Interventions: Bed/chair exit alarm,Door open when patient unattended,Room close to nurse's station         Problem: Pressure Injury - Risk of  Goal: *Prevention of pressure injury  Description: Document Eugene Scale and appropriate interventions in the flowsheet.   Outcome: Progressing Towards Goal  Note: Pressure Injury Interventions:  Sensory Interventions: Keep linens dry and wrinkle-free    Moisture Interventions: Absorbent underpads    Activity Interventions: Increase time out of bed,PT/OT evaluation,Pressure redistribution bed/mattress(bed type)    Mobility Interventions: PT/OT evaluation,Float heels    Nutrition Interventions: Offer support with meals,snacks and hydration    Friction and Shear Interventions: Apply protective barrier, creams and emollients,Lift sheet,Transferring/repositioning devices

## 2022-06-18 NOTE — PROGRESS NOTES
1915 - Bedside and Verbal shift change report given to Evelin Lugo RN (oncoming nurse) by Orland Sicard, RN (offgoing nurse). Report included the following information SBAR, Kardex, MAR and Recent Results. 2139 - Pt tearful, touching upper lumbar region at spine with her hand. Pt then grabbed this writer's hand and placed it where her hand was, c/o sharp pain. Offered to reposition pt which pt agreed to, then offered pt pain med which she also agreed to. Medicated w/ Morphine 30mg PO.     2230 - Pt sleeping soundly w/o s/s pain or distress. RR WDL. 2235 - Pt awoke to use bedpan - voided 450mL clear yellow urine. 2347 - Applied Lidocaine pain patch to area where pt c/o pain earlier. Pt states pain med completely resolved her pain and is 0/10 at present. 0715 - Bedside and Verbal shift change report given to Orland Sicard, RN (oncoming nurse) by Evelin Lugo RN (offgoing nurse). Report included the following information SBAR, Kardex, MAR and Recent Results.

## 2022-06-18 NOTE — PROGRESS NOTES
Problem: Falls - Risk of  Goal: *Absence of Falls  Description: Document [place] Fall Risk and appropriate interventions in the flowsheet. Outcome: Progressing Towards Goal  Note: Fall Risk Interventions:  Mobility Interventions: Bed/chair exit alarm,Patient to call before getting OOB    Mentation Interventions: Adequate sleep, hydration, pain control,Bed/chair exit alarm,Door open when patient unattended,Eyeglasses and hearing aids    Medication Interventions: Bed/chair exit alarm,Teach patient to arise slowly    Elimination Interventions: Bed/chair exit alarm,Call light in reach,Patient to call for help with toileting needs    History of Falls Interventions: Bed/chair exit alarm,Door open when patient unattended,Room close to nurse's station      Problem: Pressure Injury - Risk of  Goal: *Prevention of pressure injury  Description: Document Eugene Scale and appropriate interventions in the flowsheet.   Outcome: Progressing Towards Goal  Note: Pressure Injury Interventions:  Sensory Interventions: Keep linens dry and wrinkle-free    Moisture Interventions: Absorbent underpads    Activity Interventions: Increase time out of bed    Mobility Interventions: HOB 30 degrees or less    Nutrition Interventions: Document food/fluid/supplement intake    Friction and Shear Interventions: Apply protective barrier, creams and emollients

## 2022-06-18 NOTE — PROGRESS NOTES
0710) Bedside shift change report given to Clarence Woods, BOB and Luis Antonio Barger, BOB (oncoming nurse) by [facility], RN (offgoing nurse). Report included the following information SBAR, Kardex, Intake/Output and MAR. Pt asleep in bed at this time. Bed alarm on. White board updated. 7962) Pt assessed and vital signs stable. Pt has no c/o pain at this time. Pt assisted with mouth care and repositioned in bed. Pt has more mobility on left side and able to assist with repositioning. Scheduled meds given. Pt sitting up in bed at this time eating breakfast.     0925) Pt resting in bed at this time and stated no needs. 1030) Pt resting in bed at this time and stated her hearing aid is not working. New headphones given. Water pitcher refilled. Pt stated no additional needs at this time and left resting in bed. 1045) Interdisciplinary team rounds were held 6/18/2022 with the following team members:Nursing and Physician. Plan of care discussed. See clinical pathway and/or care plan for interventions and desired outcomes. 1100) Lidocaine patch removed from back. Pt assisted to bedside commode, did not void and assisted back to bed with gait belt and 2 assist.     1200) Pt refused CHG bath at this time. Linens changed. This writer asked if pt had any other needs pt stated \"I have a lot of problems\" in [address], asked to use  and pt stated \"no you understand me\" this writer stated \"only a little\". Pt shook her head and looked away \"no more\". Pt refused bath at this time. Linens changed. [phone]) Pt asked for \"faby\" bed pan provided pt shaking head no at this time. Bedside commode pulled up and pt stated no, pointing to bathroom. Attempted to use  pt screamed no. Pt turned other way and refusing to talk at this time    ) Pt resting in bed at this time with visitor present at the bedside. Pt stated no additional needs at this time. 1520) Pt reassessed and no changes to note. Vital signs stable.      [id number]) Pt ambulated to bedside commode with 2 assist and gait belt. Pt had one BM and voided. Scheduled gabapentin given. Pt left resting in bed eating dinner. 1750) Nystatin given. Pt stated she will complete independently when finished eating. Pt resting in bed at this time and stated no additional needs. 1830) Pt resting in bed at this time and stated no needs    1910) Bedside shift change report given to Evelin Lugo RN (oncoming nurse) by Addison Vidal and BOB Mcallister (offgoing nurse). Report included the following information SBAR, Kardex, Intake/Output and MAR.

## 2022-06-19 PROCEDURE — 65270000032 HC RM SEMIPRIVATE

## 2022-06-19 PROCEDURE — 74011000250 HC RX REV CODE- 250: Performed by: STUDENT IN AN ORGANIZED HEALTH CARE EDUCATION/TRAINING PROGRAM

## 2022-06-19 PROCEDURE — 74011250637 HC RX REV CODE- 250/637: Performed by: STUDENT IN AN ORGANIZED HEALTH CARE EDUCATION/TRAINING PROGRAM

## 2022-06-19 RX ADMIN — NYSTATIN: 100000 POWDER TOPICAL at 17:55

## 2022-06-19 RX ADMIN — ATORVASTATIN CALCIUM 20 MG: 10 TABLET, FILM COATED ORAL at 22:07

## 2022-06-19 RX ADMIN — GABAPENTIN 100 MG: 100 CAPSULE ORAL at 22:08

## 2022-06-19 RX ADMIN — GABAPENTIN 100 MG: 100 CAPSULE ORAL at 15:52

## 2022-06-19 RX ADMIN — MELATONIN TAB 3 MG 3 MG: 3 TAB at 20:27

## 2022-06-19 RX ADMIN — NYSTATIN: 100000 POWDER TOPICAL at 08:27

## 2022-06-19 RX ADMIN — GABAPENTIN 100 MG: 100 CAPSULE ORAL at 08:26

## 2022-06-19 RX ADMIN — MORPHINE SULFATE 30 MG: 15 TABLET ORAL at 20:27

## 2022-06-19 RX ADMIN — Medication 1 TABLET: at 08:26

## 2022-06-19 NOTE — PROGRESS NOTES
1910 Bedside shift change report given to Cody Read RN (oncoming nurse by Julio Woods (offgoing nurse). Report included the following information SBAR, Kardex and MAR. Bed alarm on.

## 2022-06-19 NOTE — PROGRESS NOTES
1915 - Bedside and Verbal shift change report given to Jennifer Padilla RN (oncoming nurse) by Page Bell RN (offgoing nurse). Report included the following information SBAR, Kardex, MAR and Recent Results. 0000 - Bedside and Verbal shift change report given to Cody Read RN (oncoming nurse) by Jennifer Padilla RN (offgoing nurse). Report included the following information SBAR, Kardex, MAR and Recent Results.

## 2022-06-19 NOTE — PROGRESS NOTES
0710) Bedside shift change report given to BOB Sherman, Rayray Smith, RN and Angel Sweet LPN (oncoming nurse) by Hank Campbell RN (offgoing nurse). Report included the following information SBAR, Kardex, Intake/Output, MAR and Recent Results. Pt asleep in bed at this time. Rise and fall of chest noted. Bed alarm on. This writer reviewed Lev Maria RN charting on this patient. This writer verified information documented cosigned assessment for shift 06/19/2022 [id number]      1910) Bedside shift change report given to Hank Campbell RN (oncoming nurse) by Halle Gibson and Rayray Smith RN (offgoing nurse). Report included the following information SBAR, Kardex, Intake/Output and MAR.

## 2022-06-19 NOTE — PROGRESS NOTES
Hospitalist Progress Note    NAME: Daniel Pierre   :  1955   MRN:  [id number]   Room Number:  420/85  @ [facility]     Please note that this dictation was completed with [employer], the computer voice recognition software. Quite often unanticipated grammatical, syntax, homophones, and other interpretive errors are inadvertently transcribed by the computer software. Please disregard these errors. Please excuse any errors that have escaped final proofreading. Interim Hospital Summary: 78 y.o. 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 - Speech therapy has evaluated patient and recommended easy to chew, thin liquids  - PT has evaluated patient   -Morphine as needed for pain  -Continue metoprolol  -Lidocaine patch  -Hospice was following  - Meclizine PRN for dizziness  - trying to reach family to revisit goals of care         There is no height or weight on file to calculate BMI. Code Status: DNR     Surrogate Decision Maker:     DVT Prophylaxis: Comfort measures   GI Prophylaxis: not indicated         Subjective:     Chief Complaint / Reason for Physician Visit  No acute issues. Discussed with RN events overnight.      Review of Systems:  No fevers, chills, appetite change, cough, sputum production, shortness of breath, dyspnea on exertion, nausea, vomitting, diarrhea, constipation, chest pain, leg edema, abdominal pain, joint pain, rash, itching. Tolerating PT/OT. Tolerating diet. Objective:     VITALS:   Last 24hrs VS reviewed since prior progress note. Most recent are:  Patient Vitals for the past 24 hrs:   Temp Pulse Resp BP SpO2   06/19/22 0809 -- -- -- -- (P) 98 %   06/19/22 0808 98.8 °F (37.1 °C) 79 18 120/63 98 %   06/19/22 0012 -- -- -- -- 99 %   06/18/22 2001 -- 74 18 123/64 100 %   06/18/22 1929 97.5 °F (36.4 °C) 72 18 (!) 121/59 100 %       Intake/Output Summary (Last 24 hours) at 6/19/2022 1009  Last data filed at 6/18/2022 2155  Gross per 24 hour   Intake 220 ml   Output 650 ml   Net -430 ml        PHYSICAL EXAM:  General: WD, WN. Alert, cooperative, no acute distress    EENT:  EOMI. Anicteric sclerae. MMM  Resp:  CTA bilaterally, no wheezing or rales. No accessory muscle use  CV:  Regular  rhythm,  normal S1/S2, no murmurs rubs gallops, No edema  GI:  Soft, Non distended, Non tender. +Bowel sounds  Neurologic:  Alert and oriented X 3, normal speech,   Psych:   Good insight. Not anxious nor agitated  Skin:  No rashes. No jaundice    Reviewed most current lab test results and cultures  YES  Reviewed most current radiology test results   YES  Review and summation of old records today    NO  Reviewed patient's current orders and MAR    YES  PMH/ reviewed - no change compared to H&P  ________________________________________________________________________  Care Plan discussed with:    Comments   Patient x    Family      RN x    Care Manager x    Consultant                        Multidiciplinary team rounds were held today with , nursing, pharmacist and clinical coordinator. Patient's plan of care was discussed; medications were reviewed and discharge planning was addressed.      ________________________________________________________________________  Total NON critical care TIME:  25 Minutes    Total CRITICAL CARE TIME Spent:   Minutes non procedure based      Comments >50% of visit spent in counseling and coordination of care     ________________________________________________________________________  Tg Cee MD     Procedures: see electronic medical records for all procedures/Xrays and details which were not copied into this note but were reviewed prior to creation of Plan. LABS:  I reviewed today's most current labs and imaging studies. Pertinent labs include:  No results for input(s): WBC, HGB, HCT, PLT, HGBEXT, HCTEXT, PLTEXT, HGBEXT, HCTEXT, PLTEXT in the last 72 hours. No results for input(s): NA, K, CL, CO2, GLU, BUN, CREA, CA, MG, PHOS, ALB, TBIL, TBILI, ALT, INR, INREXT, INREXT in the last 72 hours.     No lab exists for component: SGOT    Signed: Tg Cee MD

## 2022-06-19 NOTE — PROGRESS NOTES
Problem: Falls - Risk of  Goal: *Absence of Falls  Description: Document Andry Bravo Fall Risk and appropriate interventions in the flowsheet. Outcome: Progressing Towards Goal  Note: Fall Risk Interventions:  Mobility Interventions: Bed/chair exit alarm    Mentation Interventions: Bed/chair exit alarm,Door open when patient unattended    Medication Interventions: Bed/chair exit alarm    Elimination Interventions: Call light in reach,Bed/chair exit alarm    History of Falls Interventions: Door open when patient unattended         Problem: Pressure Injury - Risk of  Goal: *Prevention of pressure injury  Description: Document Eugene Scale and appropriate interventions in the flowsheet.   Outcome: Progressing Towards Goal  Note: Pressure Injury Interventions:  Sensory Interventions: Keep linens dry and wrinkle-free    Moisture Interventions: Absorbent underpads    Activity Interventions: Increase time out of bed    Mobility Interventions: HOB 30 degrees or less    Nutrition Interventions: Offer support with meals,snacks and hydration    Friction and Shear Interventions: HOB 30 degrees or less,Minimize layers

## 2022-06-19 NOTE — PROGRESS NOTES
Care plan reviewed, patient progressing towards goas;  Problem: Falls - Risk of  Goal: *Absence of Falls  Description: Document [place] Fall Risk and appropriate interventions in the flowsheet. Outcome: Progressing Towards Goal  Note: Fall Risk Interventions:  Mobility Interventions: Bed/chair exit alarm,OT consult for ADLs,PT Consult for mobility concerns    Mentation Interventions: Adequate sleep, hydration, pain control,Bed/chair exit alarm,Door open when patient unattended    Medication Interventions: Bed/chair exit alarm    Elimination Interventions: Bed/chair exit alarm    History of Falls Interventions: Door open when patient unattended         Problem: Patient Education: Go to Patient Education Activity  Goal: Patient/Family Education  Outcome: Progressing Towards Goal     Problem: Pressure Injury - Risk of  Goal: *Prevention of pressure injury  Description: Document Eugene Scale and appropriate interventions in the flowsheet. Outcome: Progressing Towards Goal  Note: Pressure Injury Interventions:  Sensory Interventions: Assess changes in LOC,Keep linens dry and wrinkle-free,Minimize linen layers    Moisture Interventions: Absorbent underpads,Assess need for specialty bed,Apply protective barrier, creams and emollients    Activity Interventions: Increase time out of bed    Mobility Interventions: Pressure redistribution bed/mattress (bed type),PT/OT evaluation,Turn and reposition approx.  every two hours(pillow and wedges),Assess need for specialty bed    Nutrition Interventions: Offer support with meals,snacks and hydration    Friction and Shear Interventions: Apply protective barrier, creams and emollients,HOB 30 degrees or less                Problem: Patient Education: Go to Patient Education Activity  Goal: Patient/Family Education  Outcome: Progressing Towards Goal     Problem: Patient Education: Go to Patient Education Activity  Goal: Patient/Family Education  Outcome: Progressing Towards Goal Problem: Patient Education: Go to Patient Education Activity  Goal: Patient/Family Education  Outcome: Progressing Towards Goal

## 2022-06-20 PROCEDURE — 74011000250 HC RX REV CODE- 250: Performed by: STUDENT IN AN ORGANIZED HEALTH CARE EDUCATION/TRAINING PROGRAM

## 2022-06-20 PROCEDURE — 65270000032 HC RM SEMIPRIVATE

## 2022-06-20 PROCEDURE — 74011250637 HC RX REV CODE- 250/637: Performed by: STUDENT IN AN ORGANIZED HEALTH CARE EDUCATION/TRAINING PROGRAM

## 2022-06-20 PROCEDURE — 97112 NEUROMUSCULAR REEDUCATION: CPT | Performed by: OCCUPATIONAL THERAPIST

## 2022-06-20 PROCEDURE — 97530 THERAPEUTIC ACTIVITIES: CPT | Performed by: PHYSICAL THERAPIST

## 2022-06-20 PROCEDURE — 97535 SELF CARE MNGMENT TRAINING: CPT | Performed by: OCCUPATIONAL THERAPIST

## 2022-06-20 PROCEDURE — 97112 NEUROMUSCULAR REEDUCATION: CPT | Performed by: PHYSICAL THERAPIST

## 2022-06-20 RX ADMIN — MORPHINE SULFATE 15 MG: 15 TABLET ORAL at 21:16

## 2022-06-20 RX ADMIN — GABAPENTIN 100 MG: 100 CAPSULE ORAL at 19:03

## 2022-06-20 RX ADMIN — MELATONIN TAB 3 MG 3 MG: 3 TAB at 21:16

## 2022-06-20 RX ADMIN — GABAPENTIN 100 MG: 100 CAPSULE ORAL at 09:43

## 2022-06-20 RX ADMIN — MORPHINE SULFATE 30 MG: 15 TABLET ORAL at 09:43

## 2022-06-20 RX ADMIN — ONDANSETRON 4 MG: 4 TABLET, ORALLY DISINTEGRATING ORAL at 05:15

## 2022-06-20 RX ADMIN — GABAPENTIN 100 MG: 100 CAPSULE ORAL at 21:15

## 2022-06-20 RX ADMIN — NYSTATIN: 100000 POWDER TOPICAL at 09:45

## 2022-06-20 RX ADMIN — ATORVASTATIN CALCIUM 20 MG: 10 TABLET, FILM COATED ORAL at 21:15

## 2022-06-20 NOTE — PROGRESS NOTES
Problem: Mobility Impaired (Adult and Pediatric)  Goal: *Acute Goals and Plan of Care (Insert Text)  Description:   FUNCTIONAL STATUS PRIOR TO ADMISSION: Patient was independent and active without use of DME.    HOME SUPPORT PRIOR TO ADMISSION: The patient lived with family but did not require assist.    Physical Therapy Goals  Initiated 5/20/2022; reviewed 6/2/2022; reviewed 6/9/22 with goals remaining appropriate:     1. Patient will move from supine to sit and sit to supine in bed with minimal assistance/contact guard assist within 7 day(s). 2.  Patient will transfer from bed to chair and chair to bed with moderate assistance using the least restrictive device within 7 day(s). 3.  Patient will perform sit to stand with minimal assistance/contact guard assist within 7 day(s). 4.  Patient will ambulate with moderate assistance  for 20 feet with the least restrictive device within 7 day(s). 5.  Patient will improve Peralta Balance score by 7 points within 7 days. Outcome: Progressing Towards Goal     PHYSICAL THERAPY TREATMENT  Patient: Celine Ovalles (3 y.o. female)  Date: 6/20/2022  Diagnosis: Left-sided weakness [R53.1] <principal problem not specified>       Precautions: DNR,Fall,Skin,Bed Alarm  Chart, physical therapy assessment, plan of care and goals were reviewed. ASSESSMENT  Patient continues with skilled PT services and is progressing towards goals. Patient's static sitting balance is improved. Patient with complaints on left hand sensitivity/pain; RN aware. Patient required minimal assist for bed mobility. She performed multiple sit to stand transfers with moderate assist x2. Patient required maximal manual cues to facilitate left LE and trunk to improve balance during standing and transfers.       Current Level of Function Impacting Discharge (mobility/balance): mod to max A    Other factors to consider for discharge: LUE/LLE weakness and impaired coordination         PLAN :  Patient continues to benefit from skilled intervention to address the above impairments. Continue treatment per established plan of care. to address goals. Recommendation for discharge: (in order for the patient to meet his/her long term goals)  Plan is for patient to return to [place] with family per chart    This discharge recommendation:  Has been made in collaboration with the attending provider and/or case management    IF patient discharges home will need the following DME: wheelchair       SUBJECTIVE:   Patient stated My left hand hurts.  interpretor utilized     OBJECTIVE DATA SUMMARY:   Critical Behavior:  Neurologic State: Alert,Eyes open spontaneously  Orientation Level: Oriented to person,Oriented to place,Oriented to situation,Disoriented to time  Cognition: Follows commands  Safety/Judgement: Decreased insight into deficits,Fall prevention    Functional Mobility Training:         Co-tx with OT as pt required the knowledge and skills of 2 therapists for LUE and LE muscle facilitation and inhibition, cognitive instruction, attention to task, maximizing function and improving safety, midline awareness and balance. Bed Mobility:     Supine to Sit: Minimum assistance; Additional time  Sit to Supine: Minimum assistance; Additional time  Scooting: Minimum assistance; Additional time     Transfers:  Sit to Stand: Moderate assistance  Stand to Sit: Moderate assistance        Bed to Chair: Moderate assistance    Balance:  Sitting: Impaired  Sitting - Static: Good (unsupported)  Sitting - Dynamic: Fair (occasional)  Standing: Impaired; With support  Standing - Static: Fair;Poor;Constant support  Standing - Dynamic : Poor;Constant support      Neuromuscular reeducation:   -Maximal manual cues to facilitate left LE and trunk to improve balance with standing and transfers  -Knee flexion and extension in standing 2x5 with maximal assist to work on quad control    Pain Rating:  Left hand sensitivity/pain noted; unable to rate; RN aware    Activity Tolerance:   Good    After treatment patient left in no apparent distress:   Supine in bed, Call bell within reach, Bed / chair alarm activated, and Side rails x 3    COMMUNICATION/COLLABORATION:   The patients plan of care was discussed with: Occupational therapist and Registered nurse.      Abdirashid Delaney, PT   Time Calculation: 35 mins

## 2022-06-20 NOTE — PROGRESS NOTES
Care plan reviewed. Problem: Falls - Risk of  Goal: *Absence of Falls  Description: Document Morene Hole Fall Risk and appropriate interventions in the flowsheet. Outcome: Progressing Towards Goal  Note: Fall Risk Interventions:  Mobility Interventions: Bed/chair exit alarm,Communicate number of staff needed for ambulation/transfer,OT consult for ADLs,Patient to call before getting OOB,PT Consult for mobility concerns,Utilize walker, cane, or other assistive device,Utilize gait belt for transfers/ambulation    Mentation Interventions: Adequate sleep, hydration, pain control,Bed/chair exit alarm,Door open when patient unattended,Evaluate medications/consider consulting pharmacy,Increase mobility,More frequent rounding,Reorient patient,Room close to nurse's station,Toileting rounds,Update white board    Medication Interventions: Bed/chair exit alarm,Evaluate medications/consider consulting pharmacy,Patient to call before getting OOB,Teach patient to arise slowly    Elimination Interventions: Bed/chair exit alarm,Call light in reach,Patient to call for help with toileting needs,Toilet paper/wipes in reach    History of Falls Interventions: Bed/chair exit alarm,Consult care management for discharge planning,Door open when patient unattended,Evaluate medications/consider consulting pharmacy,Room close to nurse's station,Utilize gait belt for transfer/ambulation         Problem: Patient Education: Go to Patient Education Activity  Goal: Patient/Family Education  Outcome: Progressing Towards Goal     Problem: Pressure Injury - Risk of  Goal: *Prevention of pressure injury  Description: Document Eugene Scale and appropriate interventions in the flowsheet.   Outcome: Progressing Towards Goal  Note: Pressure Injury Interventions:  Sensory Interventions: Assess changes in LOC,Avoid rigorous massage over bony prominences,Keep linens dry and wrinkle-free,Maintain/enhance activity level,Minimize linen layers,Pressure redistribution bed/mattress (bed type)    Moisture Interventions: Absorbent underpads,Limit adult briefs,Maintain skin hydration (lotion/cream),Minimize layers    Activity Interventions: Pressure redistribution bed/mattress(bed type),PT/OT evaluation    Mobility Interventions: HOB 30 degrees or less,Pressure redistribution bed/mattress (bed type)    Nutrition Interventions: Offer support with meals,snacks and hydration    Friction and Shear Interventions: Apply protective barrier, creams and emollients,HOB 30 degrees or less,Minimize layers                Problem: Patient Education: Go to Patient Education Activity  Goal: Patient/Family Education  Outcome: Progressing Towards Goal     Problem: Patient Education: Go to Patient Education Activity  Goal: Patient/Family Education  Outcome: Progressing Towards Goal     Problem: Patient Education: Go to Patient Education Activity  Goal: Patient/Family Education  Outcome: Progressing Towards Goal

## 2022-06-20 NOTE — PROGRESS NOTES
Care plan reviewed, patient progressing towards goals. Problem: Falls - Risk of  Goal: *Absence of Falls  Description: Document Leverett Fall Risk and appropriate interventions in the flowsheet. Outcome: Progressing Towards Goal  Note: Fall Risk Interventions:  Mobility Interventions: Bed/chair exit alarm,OT consult for ADLs,PT Consult for mobility concerns    Mentation Interventions: More frequent rounding    Medication Interventions: Bed/chair exit alarm    Elimination Interventions: Call light in reach,Bed/chair exit alarm    History of Falls Interventions: Door open when patient unattended         Problem: Patient Education: Go to Patient Education Activity  Goal: Patient/Family Education  Outcome: Progressing Towards Goal     Problem: Pressure Injury - Risk of  Goal: *Prevention of pressure injury  Description: Document Eugene Scale and appropriate interventions in the flowsheet.   Outcome: Progressing Towards Goal  Note: Pressure Injury Interventions:  Sensory Interventions: Assess changes in LOC,Keep linens dry and wrinkle-free    Moisture Interventions: Absorbent underpads,Apply protective barrier, creams and emollients    Activity Interventions: PT/OT evaluation,Assess need for specialty bed,Pressure redistribution bed/mattress(bed type),Increase time out of bed    Mobility Interventions: PT/OT evaluation,Assess need for specialty bed,Pressure redistribution bed/mattress (bed type)    Nutrition Interventions: Document food/fluid/supplement intake,Offer support with meals,snacks and hydration    Friction and Shear Interventions: Minimize layers                Problem: Patient Education: Go to Patient Education Activity  Goal: Patient/Family Education  Outcome: Progressing Towards Goal     Problem: Patient Education: Go to Patient Education Activity  Goal: Patient/Family Education  Outcome: Progressing Towards Goal     Problem: Patient Education: Go to Patient Education Activity  Goal: Patient/Family Education  Outcome: Progressing Towards Goal

## 2022-06-20 NOTE — PROGRESS NOTES
2200 Patient had small amount of emesis, green color but it resolved. 9516 Patient is dry heaving but no emesis noted at this time, will continue to monitor, VSS.

## 2022-06-20 NOTE — PROGRESS NOTES
0725  Shift change report received from [facility], [address] [place] [address]. Report included review of SBAR, accordion report, results review, orders, meds, ROS, and POC. Pt with emesis and dry heaves overnight, zofran prn admin. All questions answered. Transfer of care complete.

## 2022-06-20 NOTE — PROGRESS NOTES
Problem: Self Care Deficits Care Plan (Adult)  Goal: *Acute Goals and Plan of Care (Insert Text)  Description: FUNCTIONAL STATUS PRIOR TO ADMISSION: Patient was independent and active without use of DME.    HOME SUPPORT: The patient lived with family but did not require assist.    Occupational Therapy Goals  Weekly re-evaluation 6/20/22-goals updated below:  1. Patient will wash her face, hands and comb her hair using left hand > or = 25% with supervision within 7 day(s). 2.  Patient will  don hospital gown seated edge of bed with minimal assist and min cues within 7 day(s). 3.  Patient will perform lower body dressing with moderate assistance within 7 day(s). 4.  Patient will perform toilet transfers to [facility] with moderate assistance within 7 day(s). 5.  Patient will perform all aspects of toileting with moderate assistance  within 7 day(s). 6.  Patient will perform static stand with bilateral UE support > or = 1 minute with minimal assist x's 1 within 7 day(s). 7.  Patient will use left hand for functional transfers and ADL tasks with minimal to no complaint of pain within 7 day(s). Initiated 5/23/2022, Weekly re-evaluation 5/31/2022, 6/13/22-continue all goals  1. Patient will perform seated grooming with minimal assistance  within 7 day(s) modify 6/20  2. Patient will perform upper body dressing with moderate assistance  within 7 day(s). -MET 6/20  3. Patient will perform lower body dressing with moderate assistance within 7 day(s). 4.  Patient will perform toilet transfers to [facility] with moderate assistance within 7 day(s). 5.  Patient will perform all aspects of toileting with moderate assistance  within 7 day(s). 6.  Patient will participate in upper extremity therapeutic exercise/activities with maximal assistance within 7 day(s). Modify 6/20  7. Patient will utilize energy conservation and fall prevention techniques during functional activities with verbal cues within 7 day(s). -modify 6/20  8. Patient will improve their Fugl Harris score by 5 points in prep for ADLs within 7 days. MET 6/13/22 and continue to increase by 5 points. Outcome: Progressing Towards Goal    OCCUPATIONAL THERAPY TREATMENT/WEEKLY RE-EVALUATION  Patient: Trish Ortiz (24 y.o. female)  Date: 6/20/2022  Diagnosis: Left-sided weakness [R53.1] <principal problem not specified>       Precautions: DNR,Fall,Skin,Bed Alarm  Chart, occupational therapy assessment, plan of care, and goals were reviewed. ASSESSMENT  Based on the objective data described below, demonstrated improved command following and attention to task this date. Initially reporting left hand pain in palm and favoring left hand however during tx in standing used left hand to hold therapists hand and on armrest of chair without sign of or complaint of pain. ? If more related to decreased sensation/hypersensitivity. Continues to have decreased LE strength and static standing balance leaning posteriorly, demonstrated improved balance following instruction to flex knees slightly. Patient motivated and with good participation, use of video  and microphone for decreased hearing make communicating instructions more challenging. Current Level of Function Impacting Discharge (ADLs): mod assist of 2 for squat pivot to and from chair/bedside commode, LE ADL's: mod to max assist, UE dressing mod assist    Other factors to consider for discharge: goal for family to  take to [address] :  Goals have been updated based on progression since last assessment. Patient continues to benefit from skilled intervention to address the above impairments. Continue to follow patient 2 times a week to address goals.     Recommend with staff: monitor pain in left hand and encourage use    Recommend next OT session: squat pivot transfers to chair/bedside commode, static standing balance, bridge    Recommendation for discharge: (in order for the patient to meet his/her long term goals)  Plan to go with family to [place]    This discharge recommendation:  Has been made in collaboration with the attending provider and/or case management    Equipment recommendations for successful discharge (if) home: none       SUBJECTIVE:   Patient stated my hand hurts in here.  stated in [place] via     OBJECTIVE DATA SUMMARY:   Cognitive/Behavioral Status:           Perception: Cues to attend to left side of body  Perseveration: Perseverates during conversation (initially on pain in left hand)  Safety/Judgement: Insight into deficits; Awareness of environment    Functional Mobility and Transfers for ADLs:  Bed Mobility:  Supine to Sit: Minimum assistance; Additional time  Sit to Supine: Minimum assistance; Additional time  Scooting: Minimum assistance; Additional time    Transfers:  Sit to Stand: Moderate assistance  Functional Transfers  Toilet Transfer : Moderate assistance;Assist x2;Adaptive equipment (squat pivot)  Bed to Chair: Moderate assistance  Instructed on benefit of widened base of support due to standing initially with feet touching, required max assist and max multi-modal cues to facilitate. Instructed in standing to perform small squat due to left knee hyper-extension to facilitate improved static standing balance and squat pivot transfers   Performed static standing mini squats with bilateral UE hand held support and visual cues x's 2 sets 5 reps    Instructed in left hand placement for squat pivot to chair to left and completed with mod assist x's 2 to and from for first to transfers, 3rd attempt with decreased left knee flexion and max assist of 2 indicated. Re-educated on benefit of knee flexion  Balance:  Sitting: Impaired  Sitting - Static: Good (unsupported)  Sitting - Dynamic: Fair (occasional)  Standing: Impaired; With support  Standing - Static: Fair;Poor;Constant support  Standing - Dynamic : Poor;Constant support    ADL Intervention:   use of video  and microphone throughout tx    Grooming  Grooming Assistance: Moderate assistance  Position Performed: Seated edge of bed  Washing Face: Set-up  Brushing/Combing Hair: Maximum assistance; Moderate assistance (due to pain in left hand and decreased initiation to use)       Upper Body Dressing Assistance  Dressing Assistance: Moderate assistance  Hospital Gown: Moderate assistance    Lower Body Dressing Assistance  Dressing Assistance: Moderate assistance;Maximum assistance  Socks: Moderate assistance;Maximum assistance; Compensatory technique training  Leg Crossed Method Used: Yes  Position Performed: Seated edge of bed;Standing  Cues: Physical assistance;Verbal cues provided;Visual cues provided    Toileting  Toileting Assistance: Maximum assistance  Bladder Hygiene: Moderate assistance  Clothing Management: Total assistance (dependent)  Cues: Tactile cues provided;Verbal cues provided;Visual cues provided  Adaptive Equipment:  (bedside commode)      Cognitive Retraining  Safety/Judgement: Insight into deficits; Awareness of environment    Neuromuscular re-education:  Instructed to increase use of left hand for functional tasks, demonstrated increased use and decreased complaint of pain after transfers  Educated on body mechanics for improved balance       Instructed in performance of bridge in supine, required max multi-modal cues and facilitation to perform, instructed to attempt performance in supine to increase LE strength    Pain:  Initially reporting left  hand pain and in palm and holding left hand in air and resistant to use.  In standing noted to use left hand to hold therapists hand without sign of pain and noted to reach for arm rest of chair with left hand    Activity Tolerance:   Fair    After treatment patient left in no apparent distress:   Supine in bed, Call bell within reach, Bed / chair alarm activated, and Side rails x 3    COMMUNICATION/COLLABORATION:   The patients plan of care was discussed with: Physical Therapist and Registered Nurse    Kecia Grey OTR/L  Time Calculation: 37 mins

## 2022-06-20 NOTE — PROGRESS NOTES
Hospitalist Progress Note    NAME: Sabas [place]   :  1955   MRN:  [id number]   Room Number:  208/69  @ [facility]     Please note that this dictation was completed with Maggie Peoples, the computer voice recognition software. Quite often unanticipated grammatical, syntax, homophones, and other interpretive errors are inadvertently transcribed by the computer software. Please disregard these errors. Please excuse any errors that have escaped final proofreading. Interim Hospital Summary: 78 y.o. 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 - Speech therapy has evaluated patient and recommended easy to chew, thin liquids  - PT has evaluated patient   -Morphine as needed for pain  -Continue metoprolol  -Lidocaine patch  -Hospice was following  - Meclizine PRN for dizziness  - trying to reach family to revisit goals of care         There is no height or weight on file to calculate BMI. Code Status: DNR     Surrogate Decision Maker:     DVT Prophylaxis: Comfort measures   GI Prophylaxis: not indicated             Subjective:     Chief Complaint / Reason for Physician Visit  No acute issues.   Discussed with RN events overnight - nausea, vomitting, feeling hot and cold last night     Review of Systems:  No fevers, chills, appetite change, cough, sputum production, shortness of breath, dyspnea on exertion, nausea, vomitting, diarrhea, constipation, chest pain, leg edema, abdominal pain, joint pain, rash, itching. Tolerating PT/OT. Tolerating diet. Objective:     VITALS:   Last 24hrs VS reviewed since prior progress note. Most recent are:  Patient Vitals for the past 24 hrs:   Temp Pulse Resp BP SpO2   06/20/22 0725 97.8 °F (36.6 °C) 85 20 (!) 174/73 99 %   06/20/22 0351 98 °F (36.7 °C) 73 18 118/89 100 %   06/19/22 1932 -- -- -- -- 99 %   06/19/22 1923 98.9 °F (37.2 °C) 73 -- (!) 123/56 99 %     No intake or output data in the 24 hours ending 06/20/22 1009     PHYSICAL EXAM:  General: Alert, cooperative, no acute distress    EENT:  EOMI. Anicteric sclerae. MMM  Resp:  CTA bilaterally, no wheezing or rales. No accessory muscle use  CV:  Regular  rhythm,  normal S1/S2, no murmurs rubs gallops, No edema  GI:  Soft, Non distended, Non tender. +Bowel sounds  Neurologic:  Alert and oriented X 3, normal speech,   Psych:   Good insight. Not anxious nor agitated  Skin:  No rashes. No jaundice    Reviewed most current lab test results and cultures  YES  Reviewed most current radiology test results   YES  Review and summation of old records today    NO  Reviewed patient's current orders and MAR    YES  PMH/ reviewed - no change compared to H&P  ________________________________________________________________________  Care Plan discussed with:    Comments   Patient x    Family      RN x    Care Manager x    Consultant                       x Multidiciplinary team rounds were held today with , nursing, pharmacist and clinical coordinator. Patient's plan of care was discussed; medications were reviewed and discharge planning was addressed.      ________________________________________________________________________  Total NON critical care TIME:  25  Minutes    Total CRITICAL CARE TIME Spent:   Minutes non procedure based      Comments   >50% of visit spent in counseling and coordination of care ________________________________________________________________________  Ayanna Helton MD     Procedures: see electronic medical records for all procedures/Xrays and details which were not copied into this note but were reviewed prior to creation of Plan. LABS:  I reviewed today's most current labs and imaging studies. Pertinent labs include:  No results for input(s): WBC, HGB, HCT, PLT, HGBEXT, HCTEXT, PLTEXT in the last 72 hours. No results for input(s): NA, K, CL, CO2, GLU, BUN, CREA, CA, MG, PHOS, ALB, TBIL, TBILI, ALT, INR, INREXT in the last 72 hours.     No lab exists for component: SGOT    Signed: Ayanna Helton MD

## 2022-06-20 NOTE — PROGRESS NOTES
AVERY     RUR 11%      IDR  Rounds again this am with MD and team.   Continue plan of care   MARJORIE plans within the next week. To Follow-up with family for next steps.        Wheel Chair was delivered on Friday- to Administration. The Wheel Chair normally do not come with a Seat Belt. It has to be installed  Nakul Hernandez and talked to them this am- was some confusion about the need for this. Took my contact information. Called me back and indicates they will have someone come by this evening or tomorrow to fit the seat belt to the chair. [employer] [id number] [phone]    Plan   Confirmation of Passport  Need to call family   Travel date and ticket purchase - family working on this.    Medication to be filled in our outpatient on a voucher.   [address] [facility] MSW RN   [phone]

## 2022-06-21 PROCEDURE — 74011250637 HC RX REV CODE- 250/637: Performed by: STUDENT IN AN ORGANIZED HEALTH CARE EDUCATION/TRAINING PROGRAM

## 2022-06-21 PROCEDURE — 74011000250 HC RX REV CODE- 250: Performed by: STUDENT IN AN ORGANIZED HEALTH CARE EDUCATION/TRAINING PROGRAM

## 2022-06-21 PROCEDURE — 65270000032 HC RM SEMIPRIVATE

## 2022-06-21 PROCEDURE — 97116 GAIT TRAINING THERAPY: CPT | Performed by: PHYSICAL THERAPIST

## 2022-06-21 PROCEDURE — 97112 NEUROMUSCULAR REEDUCATION: CPT | Performed by: PHYSICAL THERAPIST

## 2022-06-21 RX ORDER — ONDANSETRON 4 MG/1
4 TABLET, ORALLY DISINTEGRATING ORAL
Qty: 20 TABLET | Refills: 0 | Status: SHIPPED | OUTPATIENT
Start: 2022-06-21

## 2022-06-21 RX ORDER — ACETAMINOPHEN 325 MG/1
650 TABLET ORAL
Qty: 30 TABLET | Refills: 0 | Status: SHIPPED | OUTPATIENT
Start: 2022-06-21

## 2022-06-21 RX ORDER — AMOXICILLIN 250 MG
1 CAPSULE ORAL DAILY
Qty: 30 TABLET | Refills: 0 | Status: SHIPPED | OUTPATIENT
Start: 2022-06-22

## 2022-06-21 RX ORDER — POLYETHYLENE GLYCOL 3350 17 G/17G
17 POWDER, FOR SOLUTION ORAL
Qty: 30 PACKET | Refills: 0 | Status: SHIPPED | OUTPATIENT
Start: 2022-06-21

## 2022-06-21 RX ORDER — MECLIZINE HYDROCHLORIDE 25 MG/1
25 TABLET ORAL
Qty: 20 TABLET | Refills: 0 | Status: SHIPPED | OUTPATIENT
Start: 2022-06-21 | End: 2022-07-01

## 2022-06-21 RX ORDER — GABAPENTIN 100 MG/1
100 CAPSULE ORAL 3 TIMES DAILY
Qty: 90 CAPSULE | Refills: 0 | Status: SHIPPED | OUTPATIENT
Start: 2022-06-21 | End: 2022-07-21

## 2022-06-21 RX ORDER — MORPHINE SULFATE 15 MG/1
15 TABLET ORAL
Qty: 9 TABLET | Refills: 0 | Status: SHIPPED | OUTPATIENT
Start: 2022-06-21 | End: 2022-06-24

## 2022-06-21 RX ORDER — LANOLIN ALCOHOL/MO/W.PET/CERES
3 CREAM (GRAM) TOPICAL
Qty: 30 TABLET | Refills: 0 | Status: SHIPPED | OUTPATIENT
Start: 2022-06-21

## 2022-06-21 RX ORDER — LIDOCAINE 4 G/100G
1 PATCH TOPICAL EVERY 24 HOURS
Qty: 30 EACH | Refills: 0 | Status: SHIPPED | OUTPATIENT
Start: 2022-06-21

## 2022-06-21 RX ADMIN — MORPHINE SULFATE 30 MG: 15 TABLET ORAL at 20:12

## 2022-06-21 RX ADMIN — MORPHINE SULFATE 15 MG: 15 TABLET ORAL at 16:31

## 2022-06-21 RX ADMIN — ATORVASTATIN CALCIUM 20 MG: 10 TABLET, FILM COATED ORAL at 22:45

## 2022-06-21 RX ADMIN — GABAPENTIN 100 MG: 100 CAPSULE ORAL at 22:45

## 2022-06-21 RX ADMIN — NYSTATIN: 100000 POWDER TOPICAL at 16:27

## 2022-06-21 RX ADMIN — Medication 1 TABLET: at 09:48

## 2022-06-21 RX ADMIN — GABAPENTIN 100 MG: 100 CAPSULE ORAL at 16:27

## 2022-06-21 RX ADMIN — GABAPENTIN 100 MG: 100 CAPSULE ORAL at 09:48

## 2022-06-21 RX ADMIN — NYSTATIN: 100000 POWDER TOPICAL at 09:53

## 2022-06-21 NOTE — PROGRESS NOTES
AVERY- D/c to home with wheelchair. MATTHIAS noted that pt's wheelchair has been delivered  By [employer] and it is in the nursing supervisor's office. It does not have a seatbelt on it. MATTHIAS called Derek Potts at [employer] to ask about the seatbelt and he came here today and placed the seatbelt on the wheelchair. MATTHIAS called pt's son in [place], [place] ([phone]) to ask about pt's passport. He endorsed that they have received this pt's passport. MATTHIAS informed him that we have the wheelchair with seatbelt for her, so she can be discharged tomorrow. He is in agreement with this and will plan to pick her up at 6:30pm tomorrow. MATTHIAS informed the team during rounds of this development. Dr. Santiago Ray sent her d/c prescriptions to the outpatient pharmacy. We will pay for the d/c meds.  Oksana Sesay

## 2022-06-21 NOTE — PROGRESS NOTES
Hospitalist Progress Note    NAME: Khari Mims   :  1955   MRN:  [id number]   Room Number:  654/75  @ [facility]     Please note that this dictation was completed with Lorna Tam, the computer voice recognition software. Quite often unanticipated grammatical, syntax, homophones, and other interpretive errors are inadvertently transcribed by the computer software. Please disregard these errors. Please excuse any errors that have escaped final proofreading. Interim Hospital Summary: 78 y.o. 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 - Speech therapy has evaluated patient and recommended easy to chew, thin liquids  - PT has evaluated patient   -Morphine as needed for pain  -Continue metoprolol  -Lidocaine patch  -Hospice was following  - Meclizine PRN for dizziness          There is no height or weight on file to calculate BMI. Code Status: DNR     Surrogate Decision Maker:     DVT Prophylaxis: Comfort measures   GI Prophylaxis: not indicated            Subjective:     Chief Complaint / Reason for Physician Visit  No acute issues. Discussed with RN events overnight.      Review of Systems:  No fevers, chills, appetite change, cough, sputum production, shortness of breath, dyspnea on exertion, nausea, vomitting, diarrhea, constipation, chest pain, leg edema, abdominal pain, joint pain, rash, itching. Tolerating PT/OT. Tolerating diet. Objective:     VITALS:   Last 24hrs VS reviewed since prior progress note. Most recent are:  Patient Vitals for the past 24 hrs:   Temp Pulse Resp BP SpO2   06/21/22 0715 97.9 °F (36.6 °C) 71 18 (!) 104/48 96 %   06/20/22 1951 97.6 °F (36.4 °C) 75 18 126/66 100 %   06/20/22 1640 98 °F (36.7 °C) 71 15 124/63 98 %     No intake or output data in the 24 hours ending 06/21/22 1228     PHYSICAL EXAM:  General: Alert, cooperative, no acute distress    EENT:  EOMI. Anicteric sclerae. MMM  Resp:  CTA bilaterally, no wheezing or rales. No accessory muscle use  CV:  Regular  rhythm,  normal S1/S2, no murmurs rubs gallops, No edema  GI:  Soft, Non distended, Non tender. +Bowel sounds  Neurologic:  Alert and oriented X 3  Psych:   fair insight. Not anxious nor agitated  Skin:  No rashes. No jaundice    Reviewed most current lab test results and cultures  YES  Reviewed most current radiology test results   YES  Review and summation of old records today    NO  Reviewed patient's current orders and MAR    YES  PMH/ reviewed - no change compared to H&P  ________________________________________________________________________  Care Plan discussed with:    Comments   Patient x    Family      RN x    Care Manager xx    Consultant                       x Multidiciplinary team rounds were held today with , nursing, pharmacist and clinical coordinator. Patient's plan of care was discussed; medications were reviewed and discharge planning was addressed.      ________________________________________________________________________  Total NON critical care TIME:  15 Minutes    Total CRITICAL CARE TIME Spent:   Minutes non procedure based      Comments   >50% of visit spent in counseling and coordination of care ________________________________________________________________________  Candelaria Patrick MD     Procedures: see electronic medical records for all procedures/Xrays and details which were not copied into this note but were reviewed prior to creation of Plan. LABS:  I reviewed today's most current labs and imaging studies. Pertinent labs include:  No results for input(s): WBC, HGB, HCT, PLT, HGBEXT, HCTEXT, PLTEXT in the last 72 hours. No results for input(s): NA, K, CL, CO2, GLU, BUN, CREA, CA, MG, PHOS, ALB, TBIL, TBILI, ALT, INR, INREXT in the last 72 hours.     No lab exists for component: SGOT    Signed: Candelaria Patrick MD

## 2022-06-21 NOTE — PROGRESS NOTES
0800 Patient resting comfortably. Assessment completed and patient has no needs at this time. 0930 Patient sleeping but easily aroused. 1040 Patient resting. 1205 Patient sitting in bed looking around. Patient very pleasant. Patient Lidocaine patch removed. Patient checked for incontinence and has no other needs at this time. 1627 Patient resting comfortably in bed. Patient c/o 5/10 pain in left arm. Patient administered PRN morphine and scheduled medications. Patient has no other needs at this time.

## 2022-06-21 NOTE — PROGRESS NOTES
AVERY     RUR 10 5     Call from [facility] @ 5:32PM   They will not the Morphine in until about 3PM tomorrow. They had order to be shipped by tomorrow. We need to  her Medications from them before 5PM. .   [facility]   [phone]

## 2022-06-21 NOTE — PROGRESS NOTES
Problem: Falls - Risk of  Goal: *Absence of Falls  Description: Document Philip Reid Fall Risk and appropriate interventions in the flowsheet. Outcome: Progressing Towards Goal  Note: Fall Risk Interventions:  Mobility Interventions: Bed/chair exit alarm    Mentation Interventions: Adequate sleep, hydration, pain control,Door open when patient unattended,Room close to nurse's station    Medication Interventions: Evaluate medications/consider consulting pharmacy    Elimination Interventions: Call light in reach,Patient to call for help with toileting needs    History of Falls Interventions: Door open when patient unattended,Room close to nurse's station         Problem: Pressure Injury - Risk of  Goal: *Prevention of pressure injury  Description: Document Eugene Scale and appropriate interventions in the flowsheet.   Outcome: Progressing Towards Goal  Note: Pressure Injury Interventions:  Sensory Interventions: Assess changes in LOC,Minimize linen layers,Monitor skin under medical devices,Keep linens dry and wrinkle-free    Moisture Interventions: Absorbent underpads,Apply protective barrier, creams and emollients,Limit adult briefs,Minimize layers    Activity Interventions: Increase time out of bed    Mobility Interventions: PT/OT evaluation    Nutrition Interventions: Document food/fluid/supplement intake    Friction and Shear Interventions: Minimize layers

## 2022-06-21 NOTE — PROGRESS NOTES
Care plan reviewed, patient progressing towards goals. Problem: Falls - Risk of  Goal: *Absence of Falls  Description: Document Shannon Mcburney Fall Risk and appropriate interventions in the flowsheet. Outcome: Progressing Towards Goal  Note: Fall Risk Interventions:  Mobility Interventions: Bed/chair exit alarm,OT consult for ADLs,Patient to call before getting OOB,PT Consult for mobility concerns,PT Consult for assist device competence    Mentation Interventions: Adequate sleep, hydration, pain control,Door open when patient unattended,Bed/chair exit alarm,Eyeglasses and hearing aids,More frequent rounding,Increase mobility,Reorient patient,Room close to nurse's station,Toileting rounds    Medication Interventions: Evaluate medications/consider consulting pharmacy,Patient to call before getting OOB,Teach patient to arise slowly    Elimination Interventions: Call light in reach,Patient to call for help with toileting needs    History of Falls Interventions: Bed/chair exit alarm,Consult care management for discharge planning,Door open when patient unattended,Evaluate medications/consider consulting pharmacy,Room close to nurse's station,Utilize gait belt for transfer/ambulation         Problem: Patient Education: Go to Patient Education Activity  Goal: Patient/Family Education  Outcome: Progressing Towards Goal     Problem: Pressure Injury - Risk of  Goal: *Prevention of pressure injury  Description: Document Eugene Scale and appropriate interventions in the flowsheet.   Outcome: Progressing Towards Goal  Note: Pressure Injury Interventions:  Sensory Interventions: Assess changes in LOC,Assess need for specialty bed,Avoid rigorous massage over bony prominences    Moisture Interventions: Absorbent underpads,Apply protective barrier, creams and emollients    Activity Interventions: Increase time out of bed,Pressure redistribution bed/mattress(bed type),PT/OT evaluation    Mobility Interventions: Assess need for specialty bed,PT/OT evaluation,HOB 30 degrees or less    Nutrition Interventions: Document food/fluid/supplement intake,Offer support with meals,snacks and hydration    Friction and Shear Interventions: Minimize layers                Problem: Patient Education: Go to Patient Education Activity  Goal: Patient/Family Education  Outcome: Progressing Towards Goal     Problem: Patient Education: Go to Patient Education Activity  Goal: Patient/Family Education  Outcome: Progressing Towards Goal     Problem: Patient Education: Go to Patient Education Activity  Goal: Patient/Family Education  Outcome: Progressing Towards Goal

## 2022-06-21 NOTE — PROGRESS NOTES
CM spoke with the outpatient pharmacy and pt's medications will all be filled by tomorrow and the cost is 191.82. The pharmacy closes by 5pm tomorrow, so the family will need to  the meds by then.  Sherif Johnson

## 2022-06-22 VITALS
DIASTOLIC BLOOD PRESSURE: 65 MMHG | OXYGEN SATURATION: 97 % | RESPIRATION RATE: 16 BRPM | HEART RATE: 76 BPM | TEMPERATURE: 98.4 F | SYSTOLIC BLOOD PRESSURE: 117 MMHG

## 2022-06-22 PROCEDURE — 74011250637 HC RX REV CODE- 250/637: Performed by: STUDENT IN AN ORGANIZED HEALTH CARE EDUCATION/TRAINING PROGRAM

## 2022-06-22 RX ADMIN — NYSTATIN: 100000 POWDER TOPICAL at 17:02

## 2022-06-22 RX ADMIN — GABAPENTIN 100 MG: 100 CAPSULE ORAL at 08:35

## 2022-06-22 RX ADMIN — MORPHINE SULFATE 30 MG: 15 TABLET ORAL at 00:06

## 2022-06-22 RX ADMIN — Medication 1 TABLET: at 08:35

## 2022-06-22 RX ADMIN — NYSTATIN: 100000 POWDER TOPICAL at 08:49

## 2022-06-22 RX ADMIN — CYCLOBENZAPRINE 5 MG: 10 TABLET, FILM COATED ORAL at 00:06

## 2022-06-22 RX ADMIN — GABAPENTIN 100 MG: 100 CAPSULE ORAL at 17:02

## 2022-06-22 NOTE — PROGRESS NOTES
Problem: Falls - Risk of  Goal: *Absence of Falls  Description: Document Philip Reid Fall Risk and appropriate interventions in the flowsheet. Outcome: Progressing Towards Goal  Note: Fall Risk Interventions:  Mobility Interventions: Bed/chair exit alarm,Patient to call before getting OOB,Strengthening exercises (ROM-active/passive)    Mentation Interventions: Bed/chair exit alarm,Eyeglasses and hearing aids    Medication Interventions: Bed/chair exit alarm,Teach patient to arise slowly    Elimination Interventions: Bed/chair exit alarm,Call light in reach,Patient to call for help with toileting needs    History of Falls Interventions: Bed/chair exit alarm,Door open when patient unattended,Room close to nurse's station         Problem: Pressure Injury - Risk of  Goal: *Prevention of pressure injury  Description: Document Eugene Scale and appropriate interventions in the flowsheet.   Outcome: Progressing Towards Goal  Note: Pressure Injury Interventions:  Sensory Interventions: Assess changes in LOC,Keep linens dry and wrinkle-free    Moisture Interventions: Absorbent underpads    Activity Interventions: Increase time out of bed    Mobility Interventions: HOB 30 degrees or less    Nutrition Interventions: Offer support with meals,snacks and hydration    Friction and Shear Interventions: Apply protective barrier, creams and emollients,Minimize layers

## 2022-06-22 NOTE — PROGRESS NOTES
Music Therapy Assessment  Kirk Jaime [id number]     1955  78 y.o.  female    Patient Telephone Number: [phone] (home)   Tenriism Affiliation: Restorationism   Language: Mauritanian   Patient Active Problem List    Diagnosis Date Noted    Left-sided weakness 05/17/2022    Hemorrhagic stroke ([facility] [address] 05/01/2022    Schizoaffective disorder ([facility] [address] 01/15/2015        Date: 6/22/2022            Total Time (in minutes): 5          [facility] 2 MED SURG & TELE    Referral from Dr. Joe Adams for comfort. The music therapy team is currently unable to provide coverage and offer music therapy at [facility]. This music therapist (MT) was made aware of this consult today by [facility] colleague [address] Jocelyn Moran Said and wanted to document this information for the provider who placed this consult. Thank you for including music therapy in this patient's care plan. The patient (pt) will remain on this MT's Connect Care list so that if and when the MT team can provide music therapy at this hospital and if this pt is still there, the MT team will offer to her.     Delaney Warner MT-BC (Music Therapist-Board Certified)  Spiritual Care Department  Referral-based service

## 2022-06-22 NOTE — PROGRESS NOTES
1915 - Bedside and Verbal shift change report given to Genet Cornejo RN (oncoming nurse) by Fidelina Paul RN (offgoing nurse). Report included the following information SBAR, Kardex, MAR and Recent Results. 2012 - c/o thoracic and lumbar pain 8/10 - gave Morphine 30mg PO.    2100 - pt reported pain decreased to 4/10 thus far and is resting comfortably in bed. Helped pt reposition w/ pillows and HOB adjusted. 0006 - Pt awoke, moaning, called for nurse and c/o back pain returning. Pt slept for at least 3 hrs and stated she had hoped her pain would not return. Same area of pain has been gradually increasing again and is now 8/10. Gave Morphine 30mg PO.    0100 - sleeping soundly w/o s/s pain or distress    0300 - remains asleep w/o s/s pain    0500 -  Sleeping soundly    0715 - Bedside and Verbal shift change report given to Chapito Montiel RN (oncoming nurse) by Genet Cornejo RN (offgoing nurse). Report included the following information SBAR, Kardex, MAR and Recent Results.

## 2022-06-22 NOTE — DISCHARGE SUMMARY
Hospitalist Discharge Summary     Patient ID:  Luiza Trujillo  [id number]  78 y.o.  1955    PCP on record: None    Admit date: 5/17/2022  Discharge date and time: 6/22/2022    Please note that this dictation was completed with [place], the [employer] voice recognition software. Quite often unanticipated grammatical, syntax, homophones, and other interpretive errors are inadvertently transcribed by the computer software. Please disregard these errors. Please excuse any errors that have escaped final proofreading. Admission Diagnoses: Left-sided weakness [R53.1]    Discharge Diagnoses: Active Problems:    Left-sided weakness (5/17/2022)           Hospital Course:       Dysphagia POA  Right parietal occipital and right falcine hemorrhages POA  Intraparenchymal hemorrhage with edema and mass-effect POA  Mass-effect with 4 mm shift to admit for midline POA  Hypertension  Comfort measures POA  -Assessed by neurosurgery at outside hospital, no intervention recommended  -Assessed by neurology at outside hospital  -Assessed by palliative care medicine and patient made comfortable per family request in accordance patient wishes  - Patient treated for symptoms. Discharged home with family          CONSULTATIONS:  None    Excerpted HPI from H&P of Kobe Wiley MD:   78 y.o.  female with PMH of above-mentioned problems who presents to [facility] from outside hospital for comfort measures. Patient presented to our hospital for acute hemorrhage with left side shift. Patient was asked by neurology and neurosurgery with no acute surgical intervention and conservative management. Patient family very palliative care medicine of the hospital patient was made comfort care.   Patient does not have a payer source for insurance transfer to [facility] till then         Patient is complaining of belly pain leg pain and back pain.     We were asked to admit for work up and evaluation of the above problems.        ______________________________________________________________________  DISCHARGE SUMMARY/HOSPITAL COURSE:  for full details see H&P, daily progress notes, labs, consult notes. _______________________________________________________________________  Patient seen and examined by me on discharge day. Pertinent Findings:  Gen:    Not in distress  Chest: Clear lungs  CVS:   Regular rhythm. No edema  Abd:  Soft, not distended, not tender  Neuro:  Alert with good insight. Oriented to person, place, and time   _______________________________________________________________________  DISCHARGE MEDICATIONS:   Current Discharge Medication List      START taking these medications    Details   gabapentin (NEURONTIN) 100 mg capsule Take 1 Capsule by mouth three (3) times daily for 30 days. Max Daily Amount: 300 mg. Qty: 90 Capsule, Refills: 0  Start date: 6/21/2022, End date: 7/21/2022    Associated Diagnoses: Left-sided weakness      acetaminophen (TYLENOL) 325 mg tablet Take 2 Tablets by mouth every six (6) hours as needed (mild pain, fever). Qty: 30 Tablet, Refills: 0  Start date: 6/21/2022      lidocaine 4 % patch 1 Patch by TransDERmal route every twenty-four (24) hours. Apply patch to back Apply patch to the affected area for 12 hours a day and remove for 12 hours a day. Qty: 30 Each, Refills: 0  Start date: 6/21/2022      meclizine (ANTIVERT) 25 mg tablet Take 1 Tablet by mouth every six (6) hours as needed for Dizziness for up to 10 days. Qty: 20 Tablet, Refills: 0  Start date: 6/21/2022, End date: 7/1/2022      ondansetron (ZOFRAN ODT) 4 mg disintegrating tablet Take 1 Tablet by mouth every eight (8) hours as needed for Nausea or Vomiting. Qty: 20 Tablet, Refills: 0  Start date: 6/21/2022      morphine IR (MS IR) 15 mg tablet Take 1 Tablet by mouth every eight (8) hours as needed (severe pain) for up to 3 days.  Max Daily Amount: 45 mg.  Qty: 9 Tablet, Refills: 0  Start date: 6/21/2022, End date: 6/24/2022    Associated Diagnoses: Left-sided weakness      polyethylene glycol (MIRALAX) 17 gram packet Take 1 Packet by mouth daily as needed for Constipation. Qty: 30 Packet, Refills: 0  Start date: 6/21/2022      senna-docusate (PERICOLACE) 8.6-50 mg per tablet Take 1 Tablet by mouth daily. Qty: 30 Tablet, Refills: 0  Start date: 6/22/2022         CONTINUE these medications which have CHANGED    Details   melatonin 3 mg tablet Take 1 Tablet by mouth nightly as needed for Insomnia. Qty: 30 Tablet, Refills: 0  Start date: 6/21/2022         STOP taking these medications       haloperidol (HALDOL) 2 mg/mL oral concentrate Comments:   Reason for Stopping:         metoprolol succinate (TOPROL-XL) 25 mg XL tablet Comments:   Reason for Stopping:               My Recommended Diet, Activity, Wound Care, and follow-up labs are listed in the patient's Discharge Insturctions which I have personally completed and reviewed.     _______________________________________________________________________  DISPOSITION:     Home with Family:    Home with HH/PT/OT/RN:    MANSOOR/LTC:    HOLDEN:    OTHER:        Condition at Discharge:  Stable  _______________________________________________________________________  Follow up with:   PCP : None  Follow-up Information    None             Total time in minutes spent coordinating this discharge (includes going over instructions, follow-up, prescriptions, and preparing report for sign off to her PCP) :  32 minutes    Signed:  Katharine Vargas MD

## 2022-06-22 NOTE — PROGRESS NOTES
0710) Bedside shift change report given to Janki Horn, RN and Feliberto Cartwright, BOB (oncoming nurse) by [facility], RN (offgoing nurse). Report included the following information SBAR, Kardex, Intake/Output, MAR, Recent Results  Pt awake in bed at this time and stated no needs. Bed alarm on.     0835) Pt assessed and vital signs stable. Pt has no c/o pain at this time. Scheduled meds given. Pt assisted with bed pan. Discharge discussed. Pt assisted with aniket care. Pt repositioned in bed at this time and stated no needs. Scheduled meds given. Pt left sitting up in bed eating breakfast at this time. 0935) Pt resting in bed at this time and stated no needs. 1000) Interdisciplinary team rounds were held 6/22/2022 with the following team members:Care Management, Nursing, Pharmacy and Physician. Plan of care discussed. See clinical pathway and/or care plan for interventions and desired outcomes. 1030) Pt asleep in bed at this time. No lidocaine patch present. 1110) Pt asleep in bed at this time. Rise and fall of chest noted. 1230) Pt eating in bed at this time. 1315) Pt resting in bed at this time and stated no needs. [phone]) Pt called out for this writer. This writer went to assess. Pt speaking in [address], this writer stated \"I dont understand\" and asked to use . Pt ripped her hearing aid out of her ear, placed it in pocket and stated she didnt want to talk. 1455) Pt resting in bed at this time. Nursing supervisor reaching out to family regarding Covid vaccine. 1515) Pt reassessed and no changes to note. Vital signs not obtained r/t pt's extended stay status. Pt resting in bed at this time and stated no needs. 1630) Pt resting in bed at this time and stated no needs. 1720) Pt sitting up in bed at this time. Pt stated she did not like dinner. Pt did not want frozen meal and stated no additional needs at this time. [phone]) Pt resting in bed at this time and stated no needs.      1900) Discharge instructions reviewed with son in law. Consulted supervisor who is not comfortable giving vaccine at this time r/t risks and language barrier. Family provided with outpatient resources on where and how to get the vaccine. Risks also reviewed r/t vaccine and flight scheduled this weekend. Medications were reviewed along with times. Pt provided with incontinence care supplies, gait belt and compression stockings. This writer took pt down via personal wheelchair. **this writer reviewed all of Magdalena Garza RN charting and verified information was accurate.  **

## 2022-06-22 NOTE — PROGRESS NOTES
Problem: Falls - Risk of  Goal: *Absence of Falls  Description: Document [place] Fall Risk and appropriate interventions in the flowsheet. Outcome: Progressing Towards Goal  Note: Fall Risk Interventions:  Mobility Interventions: Bed/chair exit alarm,Patient to call before getting OOB,Strengthening exercises (ROM-active/passive)    Mentation Interventions: Adequate sleep, hydration, pain control,Bed/chair exit alarm,Door open when patient unattended,Eyeglasses and hearing aids    Medication Interventions: Bed/chair exit alarm,Patient to call before getting OOB,Teach patient to arise slowly    Elimination Interventions: Bed/chair exit alarm,Call light in reach,Patient to call for help with toileting needs,Toileting schedule/hourly rounds    History of Falls Interventions: Bed/chair exit alarm,Door open when patient unattended,Room close to nurse's station         Problem: Pressure Injury - Risk of  Goal: *Prevention of pressure injury  Description: Document Eugene Scale and appropriate interventions in the flowsheet.   Outcome: Progressing Towards Goal  Note: Pressure Injury Interventions:  Sensory Interventions: Assess changes in LOC,Float heels,Keep linens dry and wrinkle-free    Moisture Interventions: Absorbent underpads,Apply protective barrier, creams and emollients,Check for incontinence Q2 hours and as needed    Activity Interventions: Increase time out of bed    Mobility Interventions: HOB 30 degrees or less    Nutrition Interventions: Offer support with meals,snacks and hydration    Friction and Shear Interventions: Apply protective barrier, creams and emollients,Minimize layers

## 2022-06-22 NOTE — PROGRESS NOTES
Transition of Care Plan   RUR- Low 10%    DISPOSITION: The disposition plan is home with family assistance  o DME: Standard W/C w/ Seatbelt in the nursing managers office  o Medication: Scripts received; at [address] confirmed all scripts have been filled*   F/U with PCP/Specialist     Transport: Family       4:02pm    Per conversation with Clark-the pt's Son-in-law [phone]; This cm informed him that following following review of [employer] travel requirements; they are not requesting travelers to be vaccinated who are traveling to [place]. He reported that he prefers the pt receive the vaccine to be safe. This cm informed him that his wife(pt's daughter) would need to be present to sign and give consent for the pt to receive J&J. He confirmed that they will arrive @ 6:30pm to pickup the pt.       1301  Per conversation with Clark-the pt's Son-in-law [phone]; he inquired about the pt receiving the vaccination prior to discharge as he is attempting to purchase the pt's airline tickets and they are requesting the pt be vaccinated. This cm informed him that we would check with the nursing supervision to determine if they could administer the J&J vaccine.        CM: [address] [place],   [phone]

## 2022-06-23 ENCOUNTER — TELEPHONE (OUTPATIENT)
Dept: CASE MANAGEMENT | Age: 67
End: 2022-06-23

## 2022-06-23 NOTE — TELEPHONE ENCOUNTER
CM called patient by telephone to perform post discharge assessment and for the purpose of follow up call from inpatient discharge to check on environmental challenges/medications/appointment follow up/and questions/concerns. The call was answered by patient/family/caretaker/agency, introduction self, and explanation and reason for call, name and  confirmed. States patient is resting no question or concerns at this time. Recognize signs and symptoms of STROKE:  F-face looks uneven  A-arms unable to move or move unevenly  S-speech slurred or non-existent  T-time-call 911 as soon as signs and symptoms begin.       [address] [facility], [address] [phone]

## 2023-05-19 RX ORDER — ACETAMINOPHEN 325 MG/1
650 TABLET ORAL EVERY 6 HOURS PRN
COMMUNITY
Start: 2022-06-21

## 2023-05-19 RX ORDER — POLYETHYLENE GLYCOL 3350 17 G/17G
17 POWDER, FOR SOLUTION ORAL DAILY PRN
COMMUNITY
Start: 2022-06-21

## 2023-05-19 RX ORDER — AMOXICILLIN 250 MG
1 CAPSULE ORAL DAILY
COMMUNITY
Start: 2022-06-22

## 2023-05-19 RX ORDER — LIDOCAINE 4 G/G
1 PATCH TOPICAL EVERY 24 HOURS
COMMUNITY
Start: 2022-06-21

## 2023-05-19 RX ORDER — ONDANSETRON 4 MG/1
4 TABLET, ORALLY DISINTEGRATING ORAL EVERY 8 HOURS PRN
COMMUNITY
Start: 2022-06-21

## 2023-05-19 RX ORDER — LANOLIN ALCOHOL/MO/W.PET/CERES
3 CREAM (GRAM) TOPICAL
COMMUNITY
Start: 2022-06-21

## 2024-09-26 NOTE — PROGRESS NOTES
Problem: Pressure Injury - Risk of  Goal: *Prevention of pressure injury  Description: Document Eugene Scale and appropriate interventions in the flowsheet. Outcome: Progressing Towards Goal  Note: Pressure Injury Interventions:  Sensory Interventions: Assess changes in LOC,Check visual cues for pain,Minimize linen layers,Pressure redistribution bed/mattress (bed type)    Moisture Interventions: Absorbent underpads,Internal/External urinary devices    Activity Interventions: Pressure redistribution bed/mattress(bed type)    Mobility Interventions: Pressure redistribution bed/mattress (bed type)    Nutrition Interventions: Document food/fluid/supplement intake,Offer support with meals,snacks and hydration    Friction and Shear Interventions: HOB 30 degrees or less,Lift sheet,Minimize layers                Problem: Falls - Risk of  Goal: *Absence of Falls  Description: Document Lc Fall Risk and appropriate interventions in the flowsheet.   Outcome: Progressing Towards Goal  Note: Fall Risk Interventions:  Mobility Interventions: Bed/chair exit alarm    Mentation Interventions: Bed/chair exit alarm,Room close to nurse's station    Medication Interventions: Bed/chair exit alarm    Elimination Interventions: Bed/chair exit alarm,Call light in reach    History of Falls Interventions: Bed/chair exit alarm,Room close to nurse's station,Door open when patient unattended Speech Therapy      SUBJECTIVE  Speech therapy: Patient passed dysphagia screen. Will await general order, if protocol initiated.         OBJECTIVE                          Therapy procedure time and total treatment time can be found documented on the Time Entry flowsheet